# Patient Record
Sex: FEMALE | Race: BLACK OR AFRICAN AMERICAN | NOT HISPANIC OR LATINO | Employment: FULL TIME | URBAN - METROPOLITAN AREA
[De-identification: names, ages, dates, MRNs, and addresses within clinical notes are randomized per-mention and may not be internally consistent; named-entity substitution may affect disease eponyms.]

---

## 2022-08-05 ENCOUNTER — OFFICE VISIT (OUTPATIENT)
Dept: OBSTETRICS AND GYNECOLOGY | Facility: CLINIC | Age: 27
End: 2022-08-05

## 2022-08-05 VITALS
WEIGHT: 165 LBS | DIASTOLIC BLOOD PRESSURE: 94 MMHG | HEIGHT: 67 IN | SYSTOLIC BLOOD PRESSURE: 139 MMHG | BODY MASS INDEX: 25.9 KG/M2

## 2022-08-05 DIAGNOSIS — N76.0 ACUTE VAGINITIS: ICD-10-CM

## 2022-08-05 DIAGNOSIS — Z30.431 IUD CHECK UP: Primary | ICD-10-CM

## 2022-08-05 PROCEDURE — 99202 OFFICE O/P NEW SF 15 MIN: CPT | Performed by: OBSTETRICS & GYNECOLOGY

## 2022-08-05 RX ORDER — FLUCONAZOLE 150 MG/1
TABLET ORAL
COMMUNITY
Start: 2022-06-23 | End: 2022-08-09 | Stop reason: SDUPTHER

## 2022-08-05 NOTE — PROGRESS NOTES
Subjective   Sylvester Clarke is a 26 y.o. female.     Cc:  IUD check and vaginal discharge    History of Present Illness - Patient is a 26 year old nulligravid female who had a Mirena IUD placed 6 weeks ago.  Her previous IUD was placed over a year ago and was removed because of malpositioning of IUD.  She was having difficulty scheduling IUD follow up with gynecologist who placed it.  Further, her partner reports feeling strings during sexual activity.  She also reports vaginal discharge with odor.  She has issues with BV and yeast in past.  She denies STD exposure but current partner is a new sexual partner.      The following portions of the patient's history were reviewed and updated as appropriate:   She  has a past medical history of COVID-19 (01/2022).  She  reports that she has never smoked. She does not have any smokeless tobacco history on file. She reports current alcohol use. She reports that she does not use drugs.  Current Outpatient Medications   Medication Sig Dispense Refill   • Levonorgestrel (MIRENA, 52 MG, IU)      • fluconazole (DIFLUCAN) 150 MG tablet TAKE 1 TABLET BY MOUTH EVERY DAY AS ONE DOSE       No current facility-administered medications for this visit.     She has No Known Allergies..    Review of Systems   Constitutional: Negative for chills and fever.   Genitourinary: Positive for vaginal discharge. Negative for vaginal bleeding.       Objective   Physical Exam  Vitals reviewed. Exam conducted with a chaperone present.   Constitutional:       Appearance: Normal appearance.   Genitourinary:     General: Normal vulva.      Exam position: Lithotomy position.      Labia:         Right: No rash or tenderness.         Left: No rash or tenderness.       Vagina: Normal.      Cervix: Normal.      Comments: IUD strings trimmed  Neurological:      Mental Status: She is alert.   Psychiatric:         Mood and Affect: Mood normal.         Behavior: Behavior normal.         Thought Content:  Thought content normal.         Judgment: Judgment normal.         Assessment & Plan   Diagnoses and all orders for this visit:    1. IUD check up (Primary)        -     Strings trimmed.  Reassurance given.  2. Acute vaginitis  -     NuSwab VG+ - Swab, Vagina  -     Await cultures.

## 2022-08-09 ENCOUNTER — PATIENT MESSAGE (OUTPATIENT)
Dept: OBSTETRICS AND GYNECOLOGY | Facility: CLINIC | Age: 27
End: 2022-08-09

## 2022-08-09 ENCOUNTER — TELEPHONE (OUTPATIENT)
Dept: OBSTETRICS AND GYNECOLOGY | Facility: CLINIC | Age: 27
End: 2022-08-09

## 2022-08-09 ENCOUNTER — PATIENT ROUNDING (BHMG ONLY) (OUTPATIENT)
Dept: OBSTETRICS AND GYNECOLOGY | Facility: CLINIC | Age: 27
End: 2022-08-09

## 2022-08-09 LAB
A VAGINAE DNA VAG QL NAA+PROBE: ABNORMAL SCORE
BVAB2 DNA VAG QL NAA+PROBE: ABNORMAL SCORE
C ALBICANS DNA VAG QL NAA+PROBE: POSITIVE
C GLABRATA DNA VAG QL NAA+PROBE: NEGATIVE
C TRACH DNA VAG QL NAA+PROBE: NEGATIVE
MEGA1 DNA VAG QL NAA+PROBE: ABNORMAL SCORE
N GONORRHOEA DNA VAG QL NAA+PROBE: NEGATIVE
T VAGINALIS DNA VAG QL NAA+PROBE: NEGATIVE

## 2022-08-09 RX ORDER — METRONIDAZOLE 7.5 MG/G
GEL VAGINAL DAILY
Qty: 70 G | Refills: 0 | Status: SHIPPED | OUTPATIENT
Start: 2022-08-09 | End: 2022-08-14

## 2022-08-09 RX ORDER — FLUCONAZOLE 150 MG/1
150 TABLET ORAL ONCE
Qty: 1 TABLET | Refills: 0 | Status: SHIPPED | OUTPATIENT
Start: 2022-08-09 | End: 2022-08-09

## 2022-08-09 RX ORDER — METRONIDAZOLE 500 MG/1
500 TABLET ORAL 2 TIMES DAILY
Qty: 14 TABLET | Refills: 0 | Status: SHIPPED | OUTPATIENT
Start: 2022-08-09 | End: 2022-08-09

## 2022-08-09 NOTE — TELEPHONE ENCOUNTER
Rona    Please let her know that she has a bacterial and a yeast infection.  I called in medication.    Thanks    Arnel

## 2022-08-09 NOTE — TELEPHONE ENCOUNTER
Sylvester would like to try the metronidazole gel instead of pill this time to see if it works any better. [facility] pharmacy on file. Thank you.

## 2022-08-09 NOTE — TELEPHONE ENCOUNTER
Spoke with Ms. Vince to let her know that Dr Seals said she has a bacterial and yeast infection. He sent in Metronidazole and Diflucan. States she has had a lot of infections this summer, but has been swimming a lot. She would like Dr Seals to change the metronidazole to a gel for her to try this time. Thank you   Showering allowed/Do not make important decisions/No heavy lifting/straining/Do not drive or operate machinery Walking - Outdoors allowed/Do not drive or operate machinery/Do not make important decisions/Stairs allowed/Walking - Indoors allowed/No heavy lifting/straining/Showering allowed

## 2023-07-24 ENCOUNTER — ROUTINE PRENATAL (OUTPATIENT)
Dept: OBSTETRICS AND GYNECOLOGY | Facility: CLINIC | Age: 28
End: 2023-07-24
Payer: COMMERCIAL

## 2023-07-24 VITALS — BODY MASS INDEX: 24.12 KG/M2 | DIASTOLIC BLOOD PRESSURE: 88 MMHG | SYSTOLIC BLOOD PRESSURE: 127 MMHG | WEIGHT: 154 LBS

## 2023-07-24 DIAGNOSIS — Z34.01 PRIMIGRAVIDA, FIRST TRIMESTER: ICD-10-CM

## 2023-07-24 DIAGNOSIS — O21.9 NAUSEA AND VOMITING DURING PREGNANCY: ICD-10-CM

## 2023-07-24 DIAGNOSIS — Z3A.11 11 WEEKS GESTATION OF PREGNANCY: Primary | ICD-10-CM

## 2023-07-24 LAB
EXPIRATION DATE: NORMAL
GLUCOSE UR STRIP-MCNC: NEGATIVE MG/DL
Lab: NORMAL
PROT UR STRIP-MCNC: NEGATIVE MG/DL

## 2023-07-24 PROCEDURE — 0502F SUBSEQUENT PRENATAL CARE: CPT | Performed by: OBSTETRICS & GYNECOLOGY

## 2023-07-24 NOTE — PROGRESS NOTES
"Cc:  Pregnancy follow up.  Patient still with some nausea; she stopped taking the Unisom as it made her very sleepy the following day. She did not take the phenergan.  She states she \"belches\" a lot. Also with some cramping, denies any UTI symptoms or bleeding.  Vitals reviewed by me.  FHT obtained.  A/P:  IUP at 11 weeks with nausea/vomiting/GI symptoms of pregnancy  - Patient to try Promethazine when nausea worsens.  She can also take antacids for GERD.    - Cramping.  Likely physiologic.  Reassurance given.  "

## 2023-07-29 ENCOUNTER — HOSPITAL ENCOUNTER (EMERGENCY)
Facility: HOSPITAL | Age: 28
Discharge: HOME | End: 2023-07-29
Attending: EMERGENCY MEDICINE
Payer: COMMERCIAL

## 2023-07-29 ENCOUNTER — APPOINTMENT (OUTPATIENT)
Dept: ULTRASOUND IMAGING | Facility: HOSPITAL | Age: 28
End: 2023-07-29
Payer: COMMERCIAL

## 2023-07-29 VITALS
OXYGEN SATURATION: 99 % | HEART RATE: 83 BPM | BODY MASS INDEX: 24.33 KG/M2 | SYSTOLIC BLOOD PRESSURE: 126 MMHG | TEMPERATURE: 98.1 F | DIASTOLIC BLOOD PRESSURE: 88 MMHG | RESPIRATION RATE: 16 BRPM | WEIGHT: 155 LBS | HEIGHT: 67 IN

## 2023-07-29 DIAGNOSIS — O46.90 VAGINAL BLEEDING IN PREGNANCY: Primary | ICD-10-CM

## 2023-07-29 LAB
ABO GROUP BLD: NORMAL
ANION GAP SERPL CALCULATED.3IONS-SCNC: 13.2 MMOL/L (ref 5–15)
BASOPHILS # BLD AUTO: 0.02 10*3/MM3 (ref 0–0.2)
BASOPHILS NFR BLD AUTO: 0.2 % (ref 0–1.5)
BUN SERPL-MCNC: 9 MG/DL (ref 6–20)
BUN/CREAT SERPL: 11.3 (ref 7–25)
CALCIUM SPEC-SCNC: 9.5 MG/DL (ref 8.6–10.5)
CHLORIDE SERPL-SCNC: 107 MMOL/L (ref 98–107)
CO2 SERPL-SCNC: 19.8 MMOL/L (ref 22–29)
CREAT SERPL-MCNC: 0.8 MG/DL (ref 0.57–1)
DEPRECATED RDW RBC AUTO: 43.1 FL (ref 37–54)
EGFRCR SERPLBLD CKD-EPI 2021: 105 ML/MIN/1.73
EOSINOPHIL # BLD AUTO: 0.11 10*3/MM3 (ref 0–0.4)
EOSINOPHIL NFR BLD AUTO: 1.3 % (ref 0.3–6.2)
ERYTHROCYTE [DISTWIDTH] IN BLOOD BY AUTOMATED COUNT: 13.7 % (ref 12.3–15.4)
GLUCOSE SERPL-MCNC: 108 MG/DL (ref 65–99)
HCG INTACT+B SERPL-ACNC: NORMAL MIU/ML
HCT VFR BLD AUTO: 35.8 % (ref 34–46.6)
HGB BLD-MCNC: 11.4 G/DL (ref 12–15.9)
IMM GRANULOCYTES # BLD AUTO: 0.03 10*3/MM3 (ref 0–0.05)
IMM GRANULOCYTES NFR BLD AUTO: 0.4 % (ref 0–0.5)
LYMPHOCYTES # BLD AUTO: 1.8 10*3/MM3 (ref 0.7–3.1)
LYMPHOCYTES NFR BLD AUTO: 21.3 % (ref 19.6–45.3)
MCH RBC QN AUTO: 27.3 PG (ref 26.6–33)
MCHC RBC AUTO-ENTMCNC: 31.8 G/DL (ref 31.5–35.7)
MCV RBC AUTO: 85.9 FL (ref 79–97)
MONOCYTES # BLD AUTO: 0.5 10*3/MM3 (ref 0.1–0.9)
MONOCYTES NFR BLD AUTO: 5.9 % (ref 5–12)
NEUTROPHILS NFR BLD AUTO: 5.98 10*3/MM3 (ref 1.7–7)
NEUTROPHILS NFR BLD AUTO: 70.9 % (ref 42.7–76)
NRBC BLD AUTO-RTO: 0 /100 WBC (ref 0–0.2)
PLATELET # BLD AUTO: 208 10*3/MM3 (ref 140–450)
PMV BLD AUTO: 10.4 FL (ref 6–12)
POTASSIUM SERPL-SCNC: 3.9 MMOL/L (ref 3.5–5.2)
RBC # BLD AUTO: 4.17 10*6/MM3 (ref 3.77–5.28)
RH BLD: POSITIVE
SODIUM SERPL-SCNC: 140 MMOL/L (ref 136–145)
WBC NRBC COR # BLD: 8.44 10*3/MM3 (ref 3.4–10.8)

## 2023-07-29 PROCEDURE — 84702 CHORIONIC GONADOTROPIN TEST: CPT | Performed by: EMERGENCY MEDICINE

## 2023-07-29 PROCEDURE — 76815 OB US LIMITED FETUS(S): CPT

## 2023-07-29 PROCEDURE — 93976 VASCULAR STUDY: CPT

## 2023-07-29 PROCEDURE — 86901 BLOOD TYPING SEROLOGIC RH(D): CPT | Performed by: EMERGENCY MEDICINE

## 2023-07-29 PROCEDURE — 99284 EMERGENCY DEPT VISIT MOD MDM: CPT

## 2023-07-29 PROCEDURE — 85025 COMPLETE CBC W/AUTO DIFF WBC: CPT | Performed by: EMERGENCY MEDICINE

## 2023-07-29 PROCEDURE — 76817 TRANSVAGINAL US OBSTETRIC: CPT

## 2023-07-29 PROCEDURE — 80048 BASIC METABOLIC PNL TOTAL CA: CPT | Performed by: EMERGENCY MEDICINE

## 2023-07-29 PROCEDURE — 86900 BLOOD TYPING SEROLOGIC ABO: CPT | Performed by: EMERGENCY MEDICINE

## 2023-07-29 RX ORDER — PRENATAL VIT NO.126/IRON/FOLIC 28MG-0.8MG
TABLET ORAL DAILY
COMMUNITY

## 2023-07-29 RX ORDER — SODIUM CHLORIDE 0.9 % (FLUSH) 0.9 %
10 SYRINGE (ML) INJECTION AS NEEDED
Status: DISCONTINUED | OUTPATIENT
Start: 2023-07-29 | End: 2023-07-30 | Stop reason: HOSPADM

## 2023-07-30 NOTE — ED NOTES
Pt presents to ED c/o bright red vaginal bleeding for approx 30 min. Pt states she is 12 weeks pregnant. Pt c/o lower abd cramping.

## 2023-07-30 NOTE — ED PROVIDER NOTES
EMERGENCY DEPARTMENT ENCOUNTER    Room Number:  22/22  Date seen:  7/30/2023  PCP: Rusty Talley MD  Historian: Patient  Relevant information provided by sources other than the patient, review of external records, and social determinants of health may be included in the HPI section.      HPI:  Chief Complaint: Vaginal bleeding  Additional historical features obtained directly from: No one  Context: Sylvester Clarke is a 27 y.o. female who presents to the ED c/o bright red vaginal spotting that occurred immediately after sexual intercourse this evening.  It was painless, it was brief, and it has resolved.  Patient is approximately 13 weeks pregnant and follows with Dr. Seals.  She has had an uneventful prenatal course to this point        Initial impression including social determinants which will impact the assessment symptoms appear to have improved, she is very nervous, I think we will get basic labs and a pelvic ultrasound          PAST MEDICAL HISTORY  Active Ambulatory Problems     Diagnosis Date Noted    No Active Ambulatory Problems     Resolved Ambulatory Problems     Diagnosis Date Noted    No Resolved Ambulatory Problems     Past Medical History:   Diagnosis Date    COVID-19 01/2023         PAST SURGICAL HISTORY  Past Surgical History:   Procedure Laterality Date    CYSTECTOMY  2015    Lower Back    FINGER SURGERY Left     Pinky    WISDOM TOOTH EXTRACTION  2015         FAMILY HISTORY  Family History   Problem Relation Age of Onset    Other Father         Blood clots    Diabetes Sister     Diabetes Maternal Grandmother     Diabetes Maternal Grandfather     Thyroid cancer Paternal Grandmother          SOCIAL HISTORY  Social History     Socioeconomic History    Marital status: Single   Tobacco Use    Smoking status: Never   Vaping Use    Vaping Use: Never used   Substance and Sexual Activity    Alcohol use: Not Currently    Drug use: Never    Sexual activity: Yes     Birth control/protection: None          ALLERGIES  Patient has no known allergies.          PHYSICAL EXAM  ED Triage Vitals   Temp Heart Rate Resp BP SpO2   07/29/23 2204 07/29/23 2204 07/29/23 2204 07/29/23 2208 07/29/23 2204   98.1 °F (36.7 °C) 115 16 133/87 99 %      Temp src Heart Rate Source Patient Position BP Location FiO2 (%)   07/29/23 2204 07/29/23 2204 -- -- --   Tympanic Monitor            Physical Exam      GENERAL: ***no acute distress  HENT: nares patent  EYES: no scleral icterus, PERRL, EOMI  CV: regular rhythm, normal rate, distal pulses symmetric and palpable  RESPIRATORY: normal effort  ABDOMEN: soft, nondistended nontender with normal bowel sound  MUSCULOSKELETAL: no deformity  NEURO: alert, moves all extremities, follows commands  PSYCH:  calm, cooperative  SKIN: warm, dry with no rash        LAB RESULTS  Recent Results (from the past 24 hour(s))   Basic Metabolic Panel    Collection Time: 07/29/23 10:17 PM    Specimen: Blood   Result Value Ref Range    Glucose 108 (H) 65 - 99 mg/dL    BUN 9 6 - 20 mg/dL    Creatinine 0.80 0.57 - 1.00 mg/dL    Sodium 140 136 - 145 mmol/L    Potassium 3.9 3.5 - 5.2 mmol/L    Chloride 107 98 - 107 mmol/L    CO2 19.8 (L) 22.0 - 29.0 mmol/L    Calcium 9.5 8.6 - 10.5 mg/dL    BUN/Creatinine Ratio 11.3 7.0 - 25.0    Anion Gap 13.2 5.0 - 15.0 mmol/L    eGFR 105.0 >60.0 mL/min/1.73   hCG, Quantitative, Pregnancy    Collection Time: 07/29/23 10:17 PM    Specimen: Blood   Result Value Ref Range    HCG Quantitative 63,525.00 mIU/mL   CBC Auto Differential    Collection Time: 07/29/23 10:17 PM    Specimen: Blood   Result Value Ref Range    WBC 8.44 3.40 - 10.80 10*3/mm3    RBC 4.17 3.77 - 5.28 10*6/mm3    Hemoglobin 11.4 (L) 12.0 - 15.9 g/dL    Hematocrit 35.8 34.0 - 46.6 %    MCV 85.9 79.0 - 97.0 fL    MCH 27.3 26.6 - 33.0 pg    MCHC 31.8 31.5 - 35.7 g/dL    RDW 13.7 12.3 - 15.4 %    RDW-SD 43.1 37.0 - 54.0 fl    MPV 10.4 6.0 - 12.0 fL    Platelets 208 140 - 450 10*3/mm3    Neutrophil % 70.9 42.7 - 76.0  "%    Lymphocyte % 21.3 19.6 - 45.3 %    Monocyte % 5.9 5.0 - 12.0 %    Eosinophil % 1.3 0.3 - 6.2 %    Basophil % 0.2 0.0 - 1.5 %    Immature Grans % 0.4 0.0 - 0.5 %    Neutrophils, Absolute 5.98 1.70 - 7.00 10*3/mm3    Lymphocytes, Absolute 1.80 0.70 - 3.10 10*3/mm3    Monocytes, Absolute 0.50 0.10 - 0.90 10*3/mm3    Eosinophils, Absolute 0.11 0.00 - 0.40 10*3/mm3    Basophils, Absolute 0.02 0.00 - 0.20 10*3/mm3    Immature Grans, Absolute 0.03 0.00 - 0.05 10*3/mm3    nRBC 0.0 0.0 - 0.2 /100 WBC   ABO / Rh    Collection Time: 07/29/23 10:17 PM    Specimen: Blood   Result Value Ref Range    ABO Type A     RH type Positive        Ordered the above labs and my independent interpretation of these results are discussed in the section labeled \"ED course\" below        RADIOLOGY  US Ob Limited 1 + Fetuses    Result Date: 7/29/2023  OBSTETRIC SONOGRAM: PELVIC/TRANSVAGINAL TECHNIQUE.  HISTORY: Vaginal bleeding.  FINDINGS: Exam was performed as an emergent procedure through the emergency department to evaluate for living intrauterine pregnancy. The uterus measures 11 x 5.6 x 9.5 cm. There is a single intrauterine pregnancy with crown-rump length measuring 6.5 cm which is consistent with 12 weeks 6 days gestational age. Fetal cardiac activity is measured at 172 bpm. Gestational sac is present within the fundal uterus. Yolk sac is no longer seen. Placenta appears anterior and low-lying. There is no evidence for subchorionic hemorrhage. Cervical length id 3.5 cm and closed.  The right ovary measures 3.8 x 2 x 1.9 cm and the left ovary measures 2.5 x 1.3 x 2 cm. The ovaries appear within normal limits. There is no free fluid in the cul-de-sac.      Single living intrauterine pregnancy at approximately 12 weeks 6 days gestational age by ultrasound estimate.  This report was finalized on 7/29/2023 11:19 PM by Dr. Main Ojeda M.D.       Ordered the above noted radiological studies.  Independently interpreted by me in PACS.  My " "independent interpretation of these results are discussed in the section below labeled \"ED course\"        PROCEDURES  Procedures          MEDICATIONS GIVEN IN ER  Medications - No data to display                MEDICAL DECISION MAKING and INDEPENDENT INTERPRETATIONS      Everything documented below this statement is the \"ED course\" section which I have referred to above.  Everything discussed in the following section constitutes MY INDEPENDENT INTERPRETATIONS of the lab work, EKGs, and radiology studies, and all of my personal conversations with other providers, and all of my independent decision making regarding this patient are discussed below.             Everything documented above with this statement, in the ED course section constitutes my independent interpretation of lab work EKG and radiology studies along with my personal conversations with other providers and my independent medical decision making.  This statement will not be repeated before each data point, but they are all my independent interpretation needs contained within the \"ED course\" section.             All appropriate hygiene and PPE requirements were satisfied with this patient encounter      FINAL DIAGNOSES  Final diagnoses:   Vaginal bleeding in pregnancy           DISPOSITION  Discharge          Latest Documented Vital Signs:  As of 02:27 EDT  BP- 126/88 HR- 83 Temp- 98.1 °F (36.7 °C) (Tympanic) O2 sat- 99%        --    Please note that portions of this were completed with a voice recognition program.       Note Disclaimer: At [facility], we believe that sharing information builds trust and better relationships. You are receiving this note because you are receiving care at [facility] or recently visited. It is possible you will see health information before a provider has talked with you about it. This kind of information can be easy to misunderstand. To help you fully understand what it   "

## 2023-08-10 ENCOUNTER — TELEPHONE (OUTPATIENT)
Dept: OBSTETRICS AND GYNECOLOGY | Facility: CLINIC | Age: 28
End: 2023-08-10
Payer: COMMERCIAL

## 2023-08-10 NOTE — TELEPHONE ENCOUNTER
Pt states benadryl is not working and rash has spread from stomach to back, area of shoulder and under breast. She states it looks like a bunch of bug bites as skin is raises but no redness. However, they are itchy. Please advise.    Thanks,  Young

## 2023-08-15 ENCOUNTER — HOSPITAL ENCOUNTER (EMERGENCY)
Facility: HOSPITAL | Age: 28
Discharge: HOME | End: 2023-08-15
Attending: EMERGENCY MEDICINE | Admitting: EMERGENCY MEDICINE
Payer: COMMERCIAL

## 2023-08-15 VITALS
RESPIRATION RATE: 16 BRPM | HEART RATE: 90 BPM | BODY MASS INDEX: 24.33 KG/M2 | TEMPERATURE: 97.4 F | WEIGHT: 155 LBS | SYSTOLIC BLOOD PRESSURE: 140 MMHG | DIASTOLIC BLOOD PRESSURE: 94 MMHG | OXYGEN SATURATION: 94 % | HEIGHT: 67 IN

## 2023-08-15 DIAGNOSIS — Z3A.14 14 WEEKS GESTATION OF PREGNANCY: ICD-10-CM

## 2023-08-15 DIAGNOSIS — L25.9 CONTACT DERMATITIS, UNSPECIFIED CONTACT DERMATITIS TYPE, UNSPECIFIED TRIGGER: Primary | ICD-10-CM

## 2023-08-15 PROCEDURE — 99282 EMERGENCY DEPT VISIT SF MDM: CPT

## 2023-08-15 RX ORDER — DIPHENHYDRAMINE HCL 25 MG
25 CAPSULE ORAL EVERY 6 HOURS PRN
COMMUNITY

## 2023-08-15 RX ORDER — METHYLPREDNISOLONE 4 MG/1
TABLET ORAL
Qty: 21 TABLET | Refills: 0 | Status: SHIPPED | OUTPATIENT
Start: 2023-08-15

## 2023-08-15 RX ORDER — HYDROXYZINE HYDROCHLORIDE 25 MG/1
25 TABLET, FILM COATED ORAL 3 TIMES DAILY PRN
Qty: 15 TABLET | Refills: 0 | Status: SHIPPED | OUTPATIENT
Start: 2023-08-15

## 2023-08-15 NOTE — ED PROVIDER NOTES
EMERGENCY DEPARTMENT ENCOUNTER    Room Number:  T01/01  Date seen:  8/15/2023  PCP: Rusty Talley MD  Historian/Independent historian: n/a  Chronic or social conditions impacting care: pregnancy      HPI:  Chief Complaint: rash  A complete HPI/ROS/PMH/PSH/SH/FH are unobtainable due to: n/a  Context: Sylvester Clarke is a 27 y.o. female who presents to the ED c/o rash to abdomen, back, bilateral upper extremities for the past few days.  She states that she is currently 14 weeks pregnant and her OB/GYN recommended Benadryl, but has not been helping with the itching.  She denies any known irritants.  She states that she did recently change her detergent, but has not had issues with that in the past.  No other known irritants or allergies.  No cough, wheezing, shortness of breath.    External Medical record review:   8/9/2023: OB/GYN message regarding rash, recommended Benadryl      PAST MEDICAL HISTORY  Active Ambulatory Problems     Diagnosis Date Noted    No Active Ambulatory Problems     Resolved Ambulatory Problems     Diagnosis Date Noted    No Resolved Ambulatory Problems     Past Medical History:   Diagnosis Date    COVID-19 01/2023         PAST SURGICAL HISTORY  Past Surgical History:   Procedure Laterality Date    CYSTECTOMY  2015    Lower Back    FINGER SURGERY Left     Pinky    WISDOM TOOTH EXTRACTION  2015         FAMILY HISTORY  Family History   Problem Relation Age of Onset    Other Father         Blood clots    Diabetes Sister     Diabetes Maternal Grandmother     Diabetes Maternal Grandfather     Thyroid cancer Paternal Grandmother          SOCIAL HISTORY  Social History     Socioeconomic History    Marital status: Single   Tobacco Use    Smoking status: Never   Vaping Use    Vaping Use: Never used   Substance and Sexual Activity    Alcohol use: Not Currently    Drug use: Never    Sexual activity: Yes     Birth control/protection: None         ALLERGIES  Patient has no known  allergies.        REVIEW OF SYSTEMS  Per HPI, otherwise negative.       PHYSICAL EXAM  ED Triage Vitals [08/15/23 1806]   Temp Heart Rate Resp BP SpO2   97.4 øF (36.3 øC) 91 16 -- 94 %      Temp src Heart Rate Source Patient Position BP Location FiO2 (%)   -- -- -- -- --       Physical Exam  Vitals and nursing note reviewed.   Constitutional:       Appearance: Normal appearance.   HENT:      Head: Normocephalic and atraumatic.      Right Ear: Tympanic membrane and ear canal normal.      Left Ear: Tympanic membrane and ear canal normal.      Nose: Nose normal.      Mouth/Throat:      Mouth: Mucous membranes are moist.   Eyes:      Extraocular Movements: Extraocular movements intact.      Conjunctiva/sclera: Conjunctivae normal.   Cardiovascular:      Rate and Rhythm: Normal rate and regular rhythm.      Pulses: Normal pulses.      Heart sounds: Normal heart sounds.   Pulmonary:      Effort: Pulmonary effort is normal.      Breath sounds: Normal breath sounds. No stridor. No wheezing.   Abdominal:      General: Bowel sounds are normal.      Palpations: Abdomen is soft.      Tenderness: There is no abdominal tenderness. There is no guarding.   Musculoskeletal:         General: No swelling.   Skin:     General: Skin is warm.      Findings: Rash (see photos below) present.   Neurological:      Mental Status: She is alert and oriented to person, place, and time. Mental status is at baseline.   Psychiatric:         Mood and Affect: Mood normal.             LAB RESULTS  No results found for this or any previous visit (from the past 24 hour(s)).    Ordered the above labs and reviewed the results.        RADIOLOGY  No Radiology Exams Resulted Within Past 24 Hours    Ordered the above noted radiological studies. Reviewed by me in PACS.        MEDICATIONS GIVEN IN ER  Medications - No data to display        MEDICAL DECISION MAKING, PROGRESS, and CONSULTS    All labs have been independently reviewed by me.  All radiology studies  have been reviewed by me and I have also reviewed the radiology report.   EKG's independently viewed and interpreted by me.  Discussion below represents my analysis of pertinent findings related to patient's condition, differential diagnosis, treatment plan and final disposition.    27-year-old female currently 14 weeks pregnant presents with rash.  Has been taking Benadryl with no relief of itching.  Discussed risks/benefits with patient and pharmacy about Medrol Dosepak and she is agreeable at this time.  I also prescribed her hydroxyzine to help with the itching.  She was instructed not to take Benadryl and hydroxyzine at the same time.          Shared decision making/consideration for admission:  stable for outpatient follow up      Orders placed during this visit:  No orders of the defined types were placed in this encounter.        Differential diagnosis include, but not limited to: contact dermatitis, hives      Additional orders considered but not ordered: n/a    Independent interpretation of labs, radiology studies, and discussions with consultants:    Discussed with Dr. Tucker, who agrees with plan.     ED Course as of 08/15/23 1937   Tue Aug 15, 2023   1846 Discussed with maria c Humphreys. He states medrol dospak would be preferred oral steroid in pregnancy for contact dermatitis.  [JG]      ED Course User Index  [JG] Linda Song, APRN       1855: Updated patient on ED workup, including labs and imaging (if performed). We discussed follow up instructions and return precautions. The patient verbalizes understanding and is ready for discharge. I also instructed the patient to follow up with OBGY, derm.       DIAGNOSIS  Final diagnoses:   Contact dermatitis, unspecified contact dermatitis type, unspecified trigger   14 weeks gestation of pregnancy         DISPOSITION  discharge        Latest Documented Vital Signs:  As of 19:37 EDT  BP- 140/94 HR- 90 Temp- 97.4 øF (36.3 øC) O2 sat- 94%               --    Please note that portions of this were completed with a voice recognition program.       Note Disclaimer: At [facility], we believe that sharing information builds trust and better relationships. You are receiving this note because you are receiving care at [facility] or recently visited. It is possible you will see health information before a provider has talked with you about it. This kind of information can be easy to misunderstand. To help you fully understand what it means for your health, we urge you to discuss this note with your provider.             Linda Song, APRN  08/15/23 1939

## 2023-08-16 ENCOUNTER — TELEPHONE (OUTPATIENT)
Dept: OBSTETRICS AND GYNECOLOGY | Facility: CLINIC | Age: 28
End: 2023-08-16
Payer: COMMERCIAL

## 2023-08-16 NOTE — TELEPHONE ENCOUNTER
Patient is 14.6 wk OB that went to ER and wanted to make sure meds given were ok to take during pregnancy. Atarax amd steroid doespak ok per Rona/ph

## 2023-08-24 ENCOUNTER — ROUTINE PRENATAL (OUTPATIENT)
Dept: OBSTETRICS AND GYNECOLOGY | Facility: CLINIC | Age: 28
End: 2023-08-24
Payer: COMMERCIAL

## 2023-08-24 VITALS — WEIGHT: 155 LBS | BODY MASS INDEX: 24.28 KG/M2 | DIASTOLIC BLOOD PRESSURE: 84 MMHG | SYSTOLIC BLOOD PRESSURE: 124 MMHG

## 2023-08-24 DIAGNOSIS — Z34.02 PRIMIGRAVIDA, SECOND TRIMESTER: ICD-10-CM

## 2023-08-24 DIAGNOSIS — O20.9 FIRST TRIMESTER BLEEDING: ICD-10-CM

## 2023-08-24 DIAGNOSIS — Z3A.16 16 WEEKS GESTATION OF PREGNANCY: Primary | ICD-10-CM

## 2023-08-24 LAB
EXPIRATION DATE: ABNORMAL
GLUCOSE UR STRIP-MCNC: NEGATIVE MG/DL
Lab: ABNORMAL
PROT UR STRIP-MCNC: ABNORMAL MG/DL

## 2023-08-24 NOTE — PROGRESS NOTES
Cc:  Pregnancy follow up.  Patient had been to the ER x2 since her last visit. Her 1st time was for bleeding after intercourse 3.5 weeks ago, she has had intercourse since then but no bleeding. The 2nd time she went was for a rash on her torso and back, she was prescribed medication but was reluctant to take them, instead used a home remedy oil, this was last week she feels the rash has resolved.  She continues to have nausea but manages with diet.  Vitals reviewed by me.  Gen - alert and pleasant.  Abdomen - nontender, no guarding or rebound.  A/P:  IUP at 16 weeks  - Sonogram in 3 to 4 weeks for anatomy.  - OK to use steroid cream for dermatitis.  - Discussed dietary modifications for nausea.

## 2023-09-14 ENCOUNTER — ROUTINE PRENATAL (OUTPATIENT)
Dept: OBSTETRICS AND GYNECOLOGY | Facility: CLINIC | Age: 28
End: 2023-09-14
Payer: COMMERCIAL

## 2023-09-14 VITALS — DIASTOLIC BLOOD PRESSURE: 82 MMHG | SYSTOLIC BLOOD PRESSURE: 144 MMHG | WEIGHT: 159 LBS | BODY MASS INDEX: 24.9 KG/M2

## 2023-09-14 DIAGNOSIS — Z34.02 PRIMIGRAVIDA, SECOND TRIMESTER: ICD-10-CM

## 2023-09-14 DIAGNOSIS — Z3A.19 19 WEEKS GESTATION OF PREGNANCY: Primary | ICD-10-CM

## 2023-09-14 NOTE — PROGRESS NOTES
Cc:  Pregnancy follow up.  Patient states nausea has improved.  She reports that nausea now resolves with eating.  No bleeding or spotting.  Vitals reviewed by me.  Gen - alert and pleasant.  Abdomen - nontender  Ultrasound with normal anatomy.  A/P:  IUP at 19 weeks.  - Continue routine care.  Diabetes testing in 5 weeks.

## 2023-10-19 ENCOUNTER — ROUTINE PRENATAL (OUTPATIENT)
Dept: OBSTETRICS AND GYNECOLOGY | Facility: CLINIC | Age: 28
End: 2023-10-19
Payer: COMMERCIAL

## 2023-10-19 ENCOUNTER — TELEPHONE (OUTPATIENT)
Dept: OBSTETRICS AND GYNECOLOGY | Facility: CLINIC | Age: 28
End: 2023-10-19

## 2023-10-19 VITALS — DIASTOLIC BLOOD PRESSURE: 87 MMHG | BODY MASS INDEX: 26.47 KG/M2 | SYSTOLIC BLOOD PRESSURE: 142 MMHG | WEIGHT: 169 LBS

## 2023-10-19 DIAGNOSIS — Z34.02 PRIMIGRAVIDA, SECOND TRIMESTER: ICD-10-CM

## 2023-10-19 DIAGNOSIS — Z3A.24 24 WEEKS GESTATION OF PREGNANCY: Primary | ICD-10-CM

## 2023-10-19 LAB
EXPIRATION DATE: ABNORMAL
GLUCOSE 1H P 50 G GLC PO SERPL-MCNC: 82 MG/DL (ref 65–139)
GLUCOSE UR STRIP-MCNC: NEGATIVE MG/DL
Lab: ABNORMAL
PROT UR STRIP-MCNC: ABNORMAL MG/DL

## 2023-10-19 PROCEDURE — 0502F SUBSEQUENT PRENATAL CARE: CPT | Performed by: OBSTETRICS & GYNECOLOGY

## 2023-10-19 NOTE — PROGRESS NOTES
Patient with some left sided hip, she attributes this to weight gain. She also is c/o being SOB, no hx of asthma. Patient is doing GTT1 today. She received Flu vaccine through her employer.

## 2023-10-23 NOTE — PROGRESS NOTES
Cc:  Pregnancy follow up.  Feels well overall.  Concerned about weight gain.  Good FM.  Vitals reviewed by me.  BP noted.  Gen - alert and pleasant.  Abdomen - nontender.  A/P:  IUP at 24 weeks  - Discussed diet and weight gain.  - Glucola performed.

## 2023-11-09 ENCOUNTER — ROUTINE PRENATAL (OUTPATIENT)
Dept: OBSTETRICS AND GYNECOLOGY | Facility: CLINIC | Age: 28
End: 2023-11-09
Payer: COMMERCIAL

## 2023-11-09 VITALS — BODY MASS INDEX: 27.25 KG/M2 | SYSTOLIC BLOOD PRESSURE: 122 MMHG | WEIGHT: 174 LBS | DIASTOLIC BLOOD PRESSURE: 80 MMHG

## 2023-11-09 DIAGNOSIS — Z3A.27 27 WEEKS GESTATION OF PREGNANCY: Primary | ICD-10-CM

## 2023-11-09 DIAGNOSIS — O16.3 ELEVATED BLOOD PRESSURE COMPLICATING PREGNANCY, ANTEPARTUM, THIRD TRIMESTER: ICD-10-CM

## 2023-11-09 DIAGNOSIS — Z34.02 PRIMIGRAVIDA, SECOND TRIMESTER: ICD-10-CM

## 2023-11-09 NOTE — PROGRESS NOTES
Cc:  Pregnancy follow up.  No major complaints.  Good FM.  No bleeding or spotting.  Vitals reviewed by me.  Weight gain adequate.  Gen - alert and pleasant.  Abdomen - nontender.  A/P:  IUP at 27 weeks with possible cHTN  - cHTN.  Sonogram for growth next visit.  - Discussed management of third trimester, exercise.

## 2023-11-23 ENCOUNTER — HOSPITAL ENCOUNTER (EMERGENCY)
Facility: HOSPITAL | Age: 28
Discharge: HOME | End: 2023-11-23
Attending: OBSTETRICS & GYNECOLOGY | Admitting: OBSTETRICS & GYNECOLOGY
Payer: COMMERCIAL

## 2023-11-23 VITALS
RESPIRATION RATE: 17 BRPM | BODY MASS INDEX: 27.25 KG/M2 | DIASTOLIC BLOOD PRESSURE: 85 MMHG | TEMPERATURE: 98.2 F | SYSTOLIC BLOOD PRESSURE: 131 MMHG | HEIGHT: 67 IN | HEART RATE: 81 BPM

## 2023-11-23 LAB
BACTERIA UR QL AUTO: NORMAL /HPF
BILIRUB UR QL STRIP: NEGATIVE
CLARITY UR: CLEAR
COLOR UR: YELLOW
GLUCOSE UR STRIP-MCNC: NEGATIVE MG/DL
HGB UR QL STRIP.AUTO: ABNORMAL
HYALINE CASTS UR QL AUTO: NORMAL /LPF
KETONES UR QL STRIP: NEGATIVE
LEUKOCYTE ESTERASE UR QL STRIP.AUTO: NEGATIVE
NITRITE UR QL STRIP: NEGATIVE
PH UR STRIP.AUTO: 8.5 [PH] (ref 5–8)
PROT UR QL STRIP: NEGATIVE
RBC # UR STRIP: NORMAL /HPF
REF LAB TEST METHOD: NORMAL
SP GR UR STRIP: 1.01 (ref 1–1.03)
SQUAMOUS #/AREA URNS HPF: NORMAL /HPF
UROBILINOGEN UR QL STRIP: ABNORMAL
WBC # UR STRIP: NORMAL /HPF

## 2023-11-23 PROCEDURE — 81001 URINALYSIS AUTO W/SCOPE: CPT | Performed by: OBSTETRICS & GYNECOLOGY

## 2023-11-23 PROCEDURE — 87086 URINE CULTURE/COLONY COUNT: CPT | Performed by: OBSTETRICS & GYNECOLOGY

## 2023-11-23 PROCEDURE — 59025 FETAL NON-STRESS TEST: CPT

## 2023-11-23 PROCEDURE — 99284 EMERGENCY DEPT VISIT MOD MDM: CPT | Performed by: OBSTETRICS & GYNECOLOGY

## 2023-11-23 NOTE — OBED NOTES
ANAI Note OB        Patient Name: Sylvester Clarke  YOB: 1995  MRN: [id number]  Admission Date: 2023  1:47 PM  Date of Service: 2023    Chief Complaint: Vaginal Bleeding (ANAI-  at 29wks arrives from home with c/o vaginal bleeding after intercourse. Pt reports small amt of pink tinged discharge, denies bright red. Pt reports DFM after intercourse but feeling baby move normally now. Pt denies LOF, ctxs. Abdomen palpates soft)        Subjective     Sylvester Clarke is a 28 y.o. female  at 29w0d with Estimated Date of Delivery: 24 who presents with the chief complaint listed above.  Patient noticed some scant pink spotting on toilet paper a few hours after intercourse that is now brown in color. She sees Rusty Seals,* for her prenatal care. Her pregnancy has been complicated by:   possible chronic hypertension .        She describes fetal movement as initially decreased but now normal.  She denies rupture of membranes.  She admits to scant vaginal spotting after intercourse. She is not feeling contractions.          Objective   There are no problems to display for this patient.       OB History    Para Term  AB Living   1 0 0 0 0 0   SAB IAB Ectopic Molar Multiple Live Births   0 0 0 0 0 0      # Outcome Date GA Lbr Dat/2nd Weight Sex Delivery Anes PTL Lv   1 Current                 Past Medical History:   Diagnosis Date    Chlamydia 2016    COVID-19 2023    2022 10/2021-Vaxxed and Boosted       Past Surgical History:   Procedure Laterality Date    CYSTECTOMY  2015    Lower Back    FINGER SURGERY Left     Pinky    WISDOM TOOTH EXTRACTION         No current facility-administered medications on file prior to encounter.     Current Outpatient Medications on File Prior to Encounter   Medication Sig Dispense Refill    prenatal vitamin (prenatal, CLASSIC, vitamin) tablet Take  by mouth Daily.      diphenhydrAMINE (BENADRYL) 25 mg  "capsule Take 1 capsule by mouth Every 6 (Six) Hours As Needed for Itching. Since August 10th per OB/GYN. Taken QHS only.      hydrOXYzine (ATARAX) 25 MG tablet Take 1 tablet by mouth 3 (Three) Times a Day As Needed for Itching. 15 tablet 0    methylPREDNISolone (MEDROL) 4 MG dose pack Take as directed on package instructions. 21 tablet 0    promethazine (PHENERGAN) 12.5 MG tablet Take 1 tablet by mouth Every 6 (Six) Hours As Needed for Nausea or Vomiting. 20 tablet 1       No Known Allergies    Family History   Problem Relation Age of Onset    Other Father         Blood clots    Diabetes Sister     Diabetes Maternal Grandmother     Diabetes Maternal Grandfather     Thyroid cancer Paternal Grandmother        Social History     Socioeconomic History    Marital status: Single   Tobacco Use    Smoking status: Never     Passive exposure: Never   Vaping Use    Vaping Use: Never used   Substance and Sexual Activity    Alcohol use: Not Currently    Drug use: Never    Sexual activity: Yes     Birth control/protection: None           Review of Systems   HEENT: negative  Pulmonary: negative  CV: negative  GI: negative  : vaginal spotting  Musculoskeletal: negative  Neuro: negative    PHYSICAL EXAM:      VITAL SIGNS:  Vitals:    11/23/23 1359   BP: 131/85   BP Location: Right arm   Patient Position: Lying   Pulse: 81   Resp: 17   Temp: 98.2 °F (36.8 °C)   TempSrc: Oral   Height: 170.2 cm (67\")        FHT'S:                   Baseline:  140 BPM  Variability:  Moderate = 6 - 25 BPM  Accelerations:  10 x 10 accelerations present     Decelerations:  absent  Contractions:    irritable pattern      Interpretation:    Reactive NST for gestational age, CAT 1 tracing        PHYSICAL EXAM:    General: well developed; well nourished  no acute distress   Heart: Not performed.   Lungs  : breathing is unlabored     Abdomen: soft, non-tender; no masses       Cervix: Was checked by RN: 0 cm dilation      Contractions: Irregular, not felt by " "patient        Extremities: peripheral pulses normal, no pedal edema, no clubbing or cyanosis      LABS AND TESTING ORDERED:  Uterine and fetal monitoring  Urinalysis      LAB RESULTS:    Recent Results (from the past 24 hour(s))   Urinalysis With Culture If Indicated - Urine, Clean Catch    Collection Time: 23  2:11 PM    Specimen: Urine, Clean Catch   Result Value Ref Range    Color, UA Yellow Yellow, Straw    Appearance, UA Clear Clear    pH, UA 8.5 (H) 5.0 - 8.0    Specific Gravity, UA 1.010 1.005 - 1.030    Glucose, UA Negative Negative    Ketones, UA Negative Negative    Bilirubin, UA Negative Negative    Blood, UA Moderate (2+) (A) Negative    Protein, UA Negative Negative    Leuk Esterase, UA Negative Negative    Nitrite, UA Negative Negative    Urobilinogen, UA 0.2 E.U./dL 0.2 - 1.0 E.U./dL   Urinalysis, Microscopic Only - Urine, Clean Catch    Collection Time: 23  2:11 PM    Specimen: Urine, Clean Catch   Result Value Ref Range    RBC, UA 0-2 None Seen, 0-2 /HPF    WBC, UA 0-2 None Seen, 0-2 /HPF    Bacteria, UA None Seen None Seen /HPF    Squamous Epithelial Cells, UA 0-2 None Seen, 0-2 /HPF    Hyaline Casts, UA 0-2 None Seen /LPF    Methodology Automated Microscopy        Lab Results   Component Value Date    ABO A 2023    RH Positive 2023       No results found for: \"STREPGPB\"              Assessment & Plan     ASSESSMENT/PLAN:  Sylvester Clarke is a 28 y.o. female  at 29w0d who presented with: post coital bleeding          Final Impression:  Pregnancy at 29w0d  Reactive NST.  CAT 1 tracing  Post coital bleeding  Maternal vital signs were reviewed and were unremarkable              Vitals:    23 1359   BP: 131/85   BP Location: Right arm   Patient Position: Lying   Pulse: 81   Resp: 17   Temp: 98.2 °F (36.8 °C)   TempSrc: Oral   Height: 170.2 cm (67\")       No results found for: \"STREPGPB\"  Lab Results   Component Value Date    ABO A 2023    RH Positive " 07/29/2023         PLAN:     Patient discharged home with bleeding and labor precautions  She will keep her scheduled outpatient prenatal appointment.    I have spent 15 minutes including face to face time with the patient, greater than 50% in discussion of the diagnosis (counseling) and/or coordination of care.     Jazmin Freedman MD  11/23/2023  14:48 EST  OB Hospitalist  Phone:  [phone]

## 2023-11-24 LAB — BACTERIA SPEC AEROBE CULT: NO GROWTH

## 2023-12-01 ENCOUNTER — ROUTINE PRENATAL (OUTPATIENT)
Dept: OBSTETRICS AND GYNECOLOGY | Facility: CLINIC | Age: 28
End: 2023-12-01
Payer: COMMERCIAL

## 2023-12-01 VITALS — SYSTOLIC BLOOD PRESSURE: 123 MMHG | WEIGHT: 181 LBS | BODY MASS INDEX: 28.35 KG/M2 | DIASTOLIC BLOOD PRESSURE: 84 MMHG

## 2023-12-01 DIAGNOSIS — Z34.03 PRIMIGRAVIDA, THIRD TRIMESTER: ICD-10-CM

## 2023-12-01 DIAGNOSIS — Z23 NEED FOR DIPHTHERIA-TETANUS-PERTUSSIS (TDAP) VACCINE: ICD-10-CM

## 2023-12-01 DIAGNOSIS — Z3A.30 30 WEEKS GESTATION OF PREGNANCY: Primary | ICD-10-CM

## 2023-12-01 NOTE — PROGRESS NOTES
Cc:  Pregnancy follow up.  Patient had bleeding last week after having intercourse, she went to labor and delivery for evaluation and has not had any bleeding since.  Good FM.  Vitals reviewed by me.  Gen - alert and pleasant.  Abdomen - gravid  Ultrasound with normal growth, CURT.  A/P:  IUP at 30 weeks   - Answered questions about diet, exercise, working in pregnancy.  - Patient to get Pertussis vaccine today.

## 2023-12-15 ENCOUNTER — ROUTINE PRENATAL (OUTPATIENT)
Dept: OBSTETRICS AND GYNECOLOGY | Facility: CLINIC | Age: 28
End: 2023-12-15
Payer: COMMERCIAL

## 2023-12-15 VITALS — DIASTOLIC BLOOD PRESSURE: 89 MMHG | BODY MASS INDEX: 29.13 KG/M2 | SYSTOLIC BLOOD PRESSURE: 144 MMHG | WEIGHT: 186 LBS

## 2023-12-15 DIAGNOSIS — Z34.03 PRIMIGRAVIDA, THIRD TRIMESTER: ICD-10-CM

## 2023-12-15 DIAGNOSIS — O16.3 ELEVATED BLOOD PRESSURE COMPLICATING PREGNANCY, ANTEPARTUM, THIRD TRIMESTER: ICD-10-CM

## 2023-12-15 DIAGNOSIS — Z3A.32 32 WEEKS GESTATION OF PREGNANCY: Primary | ICD-10-CM

## 2023-12-15 NOTE — PROGRESS NOTES
Cc:  Pregnancy follow up.  Patient c/o swelling in hands and feet. She also has been SOB.  Symptoms mild.  Good FM.  No bleeding or spotting.    Vitals reviewed by me.  Repeat /78  Gen - alert and pleasant.    Abdomen - nontender, gravid.  CBC, CMP and Protein/Creatinine ratio ordered  A/P:  IUP at 32 weeks with elevated blood pressure  - Labs ordered.  Discussed implications and risks of HTN in pregnancy.  Follow up in one week for repeat BP check.

## 2023-12-16 LAB
ALBUMIN SERPL-MCNC: 3.7 G/DL (ref 3.5–5.2)
ALBUMIN/GLOB SERPL: 1.4 G/DL
ALP SERPL-CCNC: 153 U/L (ref 39–117)
ALT SERPL-CCNC: 29 U/L (ref 1–33)
AST SERPL-CCNC: 36 U/L (ref 1–32)
BILIRUB SERPL-MCNC: 0.2 MG/DL (ref 0–1.2)
BUN SERPL-MCNC: 7 MG/DL (ref 6–20)
BUN/CREAT SERPL: 10.8 (ref 7–25)
CALCIUM SERPL-MCNC: 9.2 MG/DL (ref 8.6–10.5)
CHLORIDE SERPL-SCNC: 105 MMOL/L (ref 98–107)
CO2 SERPL-SCNC: 23.4 MMOL/L (ref 22–29)
CREAT SERPL-MCNC: 0.65 MG/DL (ref 0.57–1)
CREAT UR-MCNC: 222.5 MG/DL
EGFRCR SERPLBLD CKD-EPI 2021: 124.7 ML/MIN/1.73
ERYTHROCYTE [DISTWIDTH] IN BLOOD BY AUTOMATED COUNT: 14 % (ref 12.3–15.4)
GLOBULIN SER CALC-MCNC: 2.6 GM/DL
GLUCOSE SERPL-MCNC: 65 MG/DL (ref 65–99)
HCT VFR BLD AUTO: 34 % (ref 34–46.6)
HGB BLD-MCNC: 11 G/DL (ref 12–15.9)
MCH RBC QN AUTO: 27.8 PG (ref 26.6–33)
MCHC RBC AUTO-ENTMCNC: 32.4 G/DL (ref 31.5–35.7)
MCV RBC AUTO: 85.9 FL (ref 79–97)
PLATELET # BLD AUTO: 174 10*3/MM3 (ref 140–450)
POTASSIUM SERPL-SCNC: 3.8 MMOL/L (ref 3.5–5.2)
PROT SERPL-MCNC: 6.3 G/DL (ref 6–8.5)
PROT UR-MCNC: 31.7 MG/DL
PROT/CREAT UR: 142 MG/G CREAT (ref 0–200)
RBC # BLD AUTO: 3.96 10*6/MM3 (ref 3.77–5.28)
SODIUM SERPL-SCNC: 139 MMOL/L (ref 136–145)
WBC # BLD AUTO: 7.26 10*3/MM3 (ref 3.4–10.8)

## 2023-12-22 ENCOUNTER — ROUTINE PRENATAL (OUTPATIENT)
Dept: OBSTETRICS AND GYNECOLOGY | Facility: CLINIC | Age: 28
End: 2023-12-22
Payer: COMMERCIAL

## 2023-12-22 VITALS — SYSTOLIC BLOOD PRESSURE: 134 MMHG | WEIGHT: 189 LBS | BODY MASS INDEX: 29.6 KG/M2 | DIASTOLIC BLOOD PRESSURE: 80 MMHG

## 2023-12-22 DIAGNOSIS — Z34.03 PRIMIGRAVIDA, THIRD TRIMESTER: ICD-10-CM

## 2023-12-22 DIAGNOSIS — O16.3 HYPERTENSION AFFECTING PREGNANCY IN THIRD TRIMESTER: ICD-10-CM

## 2023-12-22 DIAGNOSIS — Z3A.33 33 WEEKS GESTATION OF PREGNANCY: Primary | ICD-10-CM

## 2023-12-22 NOTE — PROGRESS NOTES
Cc:  Pregnancy follow up  Patient states her feet, ankles and finger tips are swollen.  Fetal movement is excellent.  No bleeding or spotting.  Vitas reviewed and blood pressure is normotensive this week.  No significant proteinuria.  A/P:  IUP at 33 weeks with elevated blood pressure possible chronic HTN.  - Patient with no previous history of HTN.  However, she has had borderline BP in pregnancy.  Will follow closely.  She does not meet criteria for pre-eclampsia.  Sonogram next week.

## 2023-12-28 ENCOUNTER — HOSPITAL ENCOUNTER (INPATIENT)
Facility: HOSPITAL | Age: 28
LOS: 4 days | Discharge: HOME | End: 2024-01-04
Attending: OBSTETRICS & GYNECOLOGY | Admitting: OBSTETRICS & GYNECOLOGY
Payer: COMMERCIAL

## 2023-12-28 ENCOUNTER — ROUTINE PRENATAL (OUTPATIENT)
Dept: OBSTETRICS AND GYNECOLOGY | Facility: CLINIC | Age: 28
End: 2023-12-28
Payer: COMMERCIAL

## 2023-12-28 VITALS — DIASTOLIC BLOOD PRESSURE: 99 MMHG | WEIGHT: 196 LBS | BODY MASS INDEX: 30.7 KG/M2 | SYSTOLIC BLOOD PRESSURE: 154 MMHG

## 2023-12-28 DIAGNOSIS — Z34.03 PRIMIGRAVIDA, THIRD TRIMESTER: ICD-10-CM

## 2023-12-28 DIAGNOSIS — O14.23 HELLP SYNDROME IN THIRD TRIMESTER: ICD-10-CM

## 2023-12-28 DIAGNOSIS — O16.3 HYPERTENSION AFFECTING PREGNANCY IN THIRD TRIMESTER: ICD-10-CM

## 2023-12-28 DIAGNOSIS — R51.9 INTRACTABLE HEADACHE, UNSPECIFIED CHRONICITY PATTERN, UNSPECIFIED HEADACHE TYPE: ICD-10-CM

## 2023-12-28 DIAGNOSIS — Z3A.34 34 WEEKS GESTATION OF PREGNANCY: Primary | ICD-10-CM

## 2023-12-28 PROBLEM — Z34.90 PREGNANCY: Status: RESOLVED | Noted: 2023-12-28 | Resolved: 2023-12-28

## 2023-12-28 PROBLEM — Z34.90 PREGNANCY: Status: ACTIVE | Noted: 2023-12-28

## 2023-12-28 PROBLEM — O14.93 PRE-ECLAMPSIA IN THIRD TRIMESTER: Status: ACTIVE | Noted: 2023-12-28

## 2023-12-28 LAB
ALBUMIN SERPL-MCNC: 3.8 G/DL (ref 3.5–5.2)
ALBUMIN/GLOB SERPL: 1.7 G/DL
ALP SERPL-CCNC: 180 U/L (ref 39–117)
ALT SERPL W P-5'-P-CCNC: 36 U/L (ref 1–33)
ANION GAP SERPL CALCULATED.3IONS-SCNC: 13 MMOL/L (ref 5–15)
AST SERPL-CCNC: 35 U/L (ref 1–32)
BILIRUB SERPL-MCNC: 0.2 MG/DL (ref 0–1.2)
BUN SERPL-MCNC: 7 MG/DL (ref 6–20)
BUN/CREAT SERPL: 11.3 (ref 7–25)
CALCIUM SPEC-SCNC: 9 MG/DL (ref 8.6–10.5)
CHLORIDE SERPL-SCNC: 107 MMOL/L (ref 98–107)
CO2 SERPL-SCNC: 18 MMOL/L (ref 22–29)
CREAT SERPL-MCNC: 0.62 MG/DL (ref 0.57–1)
CREAT UR-MCNC: 45.2 MG/DL
DEPRECATED RDW RBC AUTO: 44 FL (ref 37–54)
EGFRCR SERPLBLD CKD-EPI 2021: 126.1 ML/MIN/1.73
ERYTHROCYTE [DISTWIDTH] IN BLOOD BY AUTOMATED COUNT: 14.3 % (ref 12.3–15.4)
EXPIRATION DATE: ABNORMAL
GLOBULIN UR ELPH-MCNC: 2.2 GM/DL
GLUCOSE SERPL-MCNC: 110 MG/DL (ref 65–99)
GLUCOSE UR STRIP-MCNC: NEGATIVE MG/DL
HCT VFR BLD AUTO: 34.3 % (ref 34–46.6)
HGB BLD-MCNC: 10.7 G/DL (ref 12–15.9)
Lab: ABNORMAL
MCH RBC QN AUTO: 26.6 PG (ref 26.6–33)
MCHC RBC AUTO-ENTMCNC: 31.2 G/DL (ref 31.5–35.7)
MCV RBC AUTO: 85.1 FL (ref 79–97)
PLATELET # BLD AUTO: 151 10*3/MM3 (ref 140–450)
PMV BLD AUTO: 9.8 FL (ref 6–12)
POTASSIUM SERPL-SCNC: 3.7 MMOL/L (ref 3.5–5.2)
PROT ?TM UR-MCNC: 15.7 MG/DL
PROT SERPL-MCNC: 6 G/DL (ref 6–8.5)
PROT UR STRIP-MCNC: ABNORMAL MG/DL
PROT/CREAT UR: 347.3 MG/G CREA (ref 0–200)
RBC # BLD AUTO: 4.03 10*6/MM3 (ref 3.77–5.28)
SODIUM SERPL-SCNC: 138 MMOL/L (ref 136–145)
WBC NRBC COR # BLD AUTO: 7.99 10*3/MM3 (ref 3.4–10.8)

## 2023-12-28 PROCEDURE — 59025 FETAL NON-STRESS TEST: CPT

## 2023-12-28 PROCEDURE — G0378 HOSPITAL OBSERVATION PER HR: HCPCS

## 2023-12-28 PROCEDURE — 85027 COMPLETE CBC AUTOMATED: CPT | Performed by: OBSTETRICS & GYNECOLOGY

## 2023-12-28 PROCEDURE — 84156 ASSAY OF PROTEIN URINE: CPT | Performed by: OBSTETRICS & GYNECOLOGY

## 2023-12-28 PROCEDURE — 80053 COMPREHEN METABOLIC PANEL: CPT | Performed by: OBSTETRICS & GYNECOLOGY

## 2023-12-28 PROCEDURE — 25010000002 BETAMETHASONE ACET & SOD PHOS PER 4 MG: Performed by: OBSTETRICS & GYNECOLOGY

## 2023-12-28 PROCEDURE — 0502F SUBSEQUENT PRENATAL CARE: CPT | Performed by: OBSTETRICS & GYNECOLOGY

## 2023-12-28 PROCEDURE — 99221 1ST HOSP IP/OBS SF/LOW 40: CPT | Performed by: OBSTETRICS & GYNECOLOGY

## 2023-12-28 PROCEDURE — 82570 ASSAY OF URINE CREATININE: CPT | Performed by: OBSTETRICS & GYNECOLOGY

## 2023-12-28 PROCEDURE — 59025 FETAL NON-STRESS TEST: CPT | Performed by: OBSTETRICS & GYNECOLOGY

## 2023-12-28 RX ORDER — PRENATAL VIT/IRON FUM/FOLIC AC 27MG-0.8MG
1 TABLET ORAL DAILY
Status: DISCONTINUED | OUTPATIENT
Start: 2023-12-29 | End: 2023-12-31

## 2023-12-28 RX ORDER — BUTALBITAL, ACETAMINOPHEN AND CAFFEINE 50; 325; 40 MG/1; MG/1; MG/1
1 TABLET ORAL EVERY 6 HOURS PRN
Status: DISCONTINUED | OUTPATIENT
Start: 2023-12-28 | End: 2024-01-01

## 2023-12-28 RX ORDER — METOCLOPRAMIDE HYDROCHLORIDE 5 MG/ML
10 INJECTION INTRAMUSCULAR; INTRAVENOUS EVERY 8 HOURS PRN
Status: DISCONTINUED | OUTPATIENT
Start: 2023-12-28 | End: 2024-01-04 | Stop reason: HOSPADM

## 2023-12-28 RX ORDER — SODIUM CHLORIDE 0.9 % (FLUSH) 0.9 %
10 SYRINGE (ML) INJECTION EVERY 12 HOURS SCHEDULED
Status: DISCONTINUED | OUTPATIENT
Start: 2023-12-28 | End: 2023-12-31

## 2023-12-28 RX ORDER — SODIUM CHLORIDE 0.9 % (FLUSH) 0.9 %
10 SYRINGE (ML) INJECTION AS NEEDED
Status: DISCONTINUED | OUTPATIENT
Start: 2023-12-28 | End: 2023-12-31

## 2023-12-28 RX ORDER — BETAMETHASONE SODIUM PHOSPHATE AND BETAMETHASONE ACETATE 3; 3 MG/ML; MG/ML
12 INJECTION, SUSPENSION INTRA-ARTICULAR; INTRALESIONAL; INTRAMUSCULAR; SOFT TISSUE EVERY 24 HOURS
Status: COMPLETED | OUTPATIENT
Start: 2023-12-28 | End: 2023-12-29

## 2023-12-28 RX ADMIN — BUTALBITAL, ACETAMINOPHEN AND CAFFEINE 1 TABLET: 325; 50; 40 TABLET ORAL at 21:12

## 2023-12-28 RX ADMIN — MAGNESIUM OXIDE 400 MG (241.3 MG MAGNESIUM) TABLET 400 MG: TABLET at 22:36

## 2023-12-28 RX ADMIN — BETAMETHASONE SODIUM PHOSPHATE AND BETAMETHASONE ACETATE 12 MG: 3; 3 INJECTION, SUSPENSION INTRA-ARTICULAR; INTRALESIONAL; INTRAMUSCULAR at 12:01

## 2023-12-28 NOTE — PROGRESS NOTES
"Cc:  Pregnancy follow up.  Patient with a headache that began last Friday; headache was initially severe and she tried to \"sleep it off\" in a dark room. Headache improved but returned as a dull right sided headache.  Good FM.  No bleeding or spotting.  /90 repeat BP.  Trace protein noted.  Gen - alert and pleasant.  Abdomen - gravid.  Ultrasound with normal growth, BPP, CURT.  A/P:  IUP at 34 weeks with hypertensive disorder of pregnancy.  - Patient had normal labs last week.  She has normal BPP and growth today.  However, will send to labor and delivery for repeat labs, serial blood pressures and more observation.  She will receive steroids.  If she is discharged, she will see me tomorrow for steroids and follow up BP on Tuesday.  Delivery will likely occur at 37 - 38 weeks.  Patient also with possible chronic HTN based on previous BP.  "

## 2023-12-28 NOTE — H&P
"[facility]   HISTORY AND PHYSICAL    Patient Name: Sylvester Clarke  : 1995  MRN: [id number]  Primary Care Physician:  Rusty Talley MD  Date of admission: 2023    Subjective   Subjective     Chief Complaint: headache, elevated BP    History of Present Illness - Patient is a 28 year old  at 34 weeks who is admitted for management of hypertension in pregnancy.  Patient does not have \"formal\" diagnosis of chronic HTN prior to pregnancy; however, she has had elevated blood pressures in pregnancy that suggest chronic HTN, starting mainly in second half of pregnancy.  She had evaluation last week with normal CBC, CMP and Protein/Creatinine ratio.  However, she reported a headache that began last weekend that was severe, but resolved spontaneously.  She had a 1 to 2 respite in symptoms before a mild frontal headache returned.  She has no visual symptoms.  Fetal movement is excellent.  She reports history of intermittent severe headaches during nonpregnant state, several times per year.  Ultrasound with growth and BPP were normal today.    Review of Systems   Constitutional:  Negative for chills and fever.   Eyes:  Negative for photophobia and visual disturbance.   Respiratory:  Negative for chest tightness.    Cardiovascular:  Negative for chest pain.   Neurological:  Positive for headaches. Negative for light-headedness.        Personal History     Past Medical History:   Diagnosis Date    Chlamydia 2016    COVID-19 2023    2022 10/2021-Vaxxed and Boosted       Past Surgical History:   Procedure Laterality Date    CYSTECTOMY  2015    Lower Back    FINGER SURGERY Left     Pinky    WISDOM TOOTH EXTRACTION         Family History: family history includes Diabetes in her maternal grandfather, maternal grandmother, and sister; Other in her father; Thyroid cancer in her paternal grandmother. Otherwise pertinent FHx was reviewed and not pertinent to current issue.    Social " History:  reports that she has never smoked. She has never been exposed to tobacco smoke. She does not have any smokeless tobacco history on file. She reports that she does not currently use alcohol. She reports that she does not use drugs.    Home Medications:  diphenhydrAMINE, hydrOXYzine, methylPREDNISolone, prenatal vitamin, and promethazine    Allergies:  No Known Allergies    Objective    Objective     Vitals:   Temp:  [98 °F (36.7 °C)] 98 °F (36.7 °C)  Heart Rate:  [80] 80  Resp:  [16] 16  BP: (135-154)/(84-99) 135/85    Physical Exam  Vitals reviewed.   Constitutional:       Appearance: Normal appearance.   Cardiovascular:      Rate and Rhythm: Normal rate and regular rhythm.   Musculoskeletal:      Right lower leg: Edema present.      Left lower leg: Edema present.   Neurological:      General: No focal deficit present.      Mental Status: She is alert.      Motor: No weakness.      Deep Tendon Reflexes: Reflexes normal.   Psychiatric:         Mood and Affect: Mood normal.         Behavior: Behavior normal.         Thought Content: Thought content normal.         Judgment: Judgment normal.         Result Review      Protein/Creatinine Ratio, Urine 347.3 High  mg/G Crea   Creatinine, Urine 45.2 mg/dL   Total Protein, Urine 15.7 mg/dL     WBC 7.99 10*3/mm3   RBC 4.03 10*6/mm3   Hemoglobin 10.7 Low  g/dL   Hematocrit 34.3 %   MCV 85.1 fL   MCH 26.6 pg   MCHC 31.2 Low  g/dL   RDW 14.3 %   RDW-SD 44.0 fl   MPV 9.8 fL   Platelets 151 10*3/mm3     Glucose 110 High  mg/dL   BUN 7 mg/dL   Creatinine 0.62 mg/dL   Sodium 138 mmol/L   Potassium 3.7 mmol/L   Chloride 107 mmol/L   CO2 18.0 Low  mmol/L   Calcium 9.0 mg/dL   Total Protein 6.0 g/dL   Albumin 3.8 g/dL   ALT (SGPT) 36 High  U/L   AST (SGOT) 35 High  U/L   Alkaline Phosphatase 180 High  U/L   Total Bilirubin 0.2 mg/dL   Globulin 2.2 gm/dL   A/G Ratio 1.7 g/dL   BUN/Creatinine Ratio 11.3    Anion Gap 13.0 mmol/L   eGFR 126.1 mL/min/1.73     Assessment & Plan    Assessment / Plan     Brief Patient Summary:  Sylvester Clarke is a 28 y.o. female primigravid at 34 weeks with likely pre-eclampsia.    Active Hospital Problems:  Active Hospital Problems    Diagnosis     **Pregnancy      Plan:   - Patient should have observation in hospital.  She will be given steroids.  She reports that she has a headache, but on further review, she has severe headache in non-pregnant state 4 or so times per year.   She does have new lab abnormalities including elevation in protein/creatinine ratio and borderline elevations in LFTs.  Will do 24 hour urine and repeat labs in am.  She will have intermittent fetal monitoring.  If patient remains stable, she can likely be managed as outpatient with deliver at 37 weeks.    DVT prophylaxis:  No DVT prophylaxis order currently exists.    CODE STATUS:       Admission Status:  I believe this patient meets inpatient status.    Rusty Seals MD

## 2023-12-28 NOTE — PLAN OF CARE
Problem: Adult Inpatient Plan of Care  Goal: Plan of Care Review  Outcome: Ongoing, Progressing  Flowsheets (Taken 12/28/2023 7274)  Plan of Care Reviewed With: patient  Outcome Evaluation: patient admitted to antepartum unit for blood pressure monitoring, headache, 24 hour urine collection and lab collection in the morning. NST BID   Goal Outcome Evaluation:  Plan of Care Reviewed With: patient           Outcome Evaluation: patient admitted to antepartum unit for blood pressure monitoring, headache, 24 hour urine collection and lab collection in the morning. NST BID

## 2023-12-29 LAB
ABO GROUP BLD: NORMAL
ALBUMIN SERPL-MCNC: 3.7 G/DL (ref 3.5–5.2)
ALBUMIN/GLOB SERPL: 1.3 G/DL
ALP SERPL-CCNC: 171 U/L (ref 39–117)
ALT SERPL W P-5'-P-CCNC: 37 U/L (ref 1–33)
ANION GAP SERPL CALCULATED.3IONS-SCNC: 14.2 MMOL/L (ref 5–15)
AST SERPL-CCNC: 34 U/L (ref 1–32)
BASOPHILS # BLD AUTO: 0.02 10*3/MM3 (ref 0–0.2)
BASOPHILS NFR BLD AUTO: 0.1 % (ref 0–1.5)
BILIRUB SERPL-MCNC: 0.2 MG/DL (ref 0–1.2)
BLD GP AB SCN SERPL QL: NEGATIVE
BUN SERPL-MCNC: 8 MG/DL (ref 6–20)
BUN/CREAT SERPL: 13.3 (ref 7–25)
CALCIUM SPEC-SCNC: 9.5 MG/DL (ref 8.6–10.5)
CHLORIDE SERPL-SCNC: 107 MMOL/L (ref 98–107)
CO2 SERPL-SCNC: 16.8 MMOL/L (ref 22–29)
COLLECT DURATION TIME UR: 24 HRS
CREAT SERPL-MCNC: 0.6 MG/DL (ref 0.57–1)
DEPRECATED RDW RBC AUTO: 44.7 FL (ref 37–54)
EGFRCR SERPLBLD CKD-EPI 2021: 127.1 ML/MIN/1.73
EOSINOPHIL # BLD AUTO: 0 10*3/MM3 (ref 0–0.4)
EOSINOPHIL NFR BLD AUTO: 0 % (ref 0.3–6.2)
ERYTHROCYTE [DISTWIDTH] IN BLOOD BY AUTOMATED COUNT: 14.8 % (ref 12.3–15.4)
GLOBULIN UR ELPH-MCNC: 2.8 GM/DL
GLUCOSE SERPL-MCNC: 99 MG/DL (ref 65–99)
HCT VFR BLD AUTO: 34.5 % (ref 34–46.6)
HGB BLD-MCNC: 11.7 G/DL (ref 12–15.9)
IMM GRANULOCYTES # BLD AUTO: 0.17 10*3/MM3 (ref 0–0.05)
IMM GRANULOCYTES NFR BLD AUTO: 1 % (ref 0–0.5)
LYMPHOCYTES # BLD AUTO: 1.4 10*3/MM3 (ref 0.7–3.1)
LYMPHOCYTES NFR BLD AUTO: 8 % (ref 19.6–45.3)
MCH RBC QN AUTO: 28.6 PG (ref 26.6–33)
MCHC RBC AUTO-ENTMCNC: 33.9 G/DL (ref 31.5–35.7)
MCV RBC AUTO: 84.4 FL (ref 79–97)
MONOCYTES # BLD AUTO: 1.1 10*3/MM3 (ref 0.1–0.9)
MONOCYTES NFR BLD AUTO: 6.3 % (ref 5–12)
NEUTROPHILS NFR BLD AUTO: 14.77 10*3/MM3 (ref 1.7–7)
NEUTROPHILS NFR BLD AUTO: 84.6 % (ref 42.7–76)
NRBC BLD AUTO-RTO: 0.2 /100 WBC (ref 0–0.2)
PLATELET # BLD AUTO: 175 10*3/MM3 (ref 140–450)
PMV BLD AUTO: 11.1 FL (ref 6–12)
POTASSIUM SERPL-SCNC: 4.5 MMOL/L (ref 3.5–5.2)
PROT 24H UR-MRATE: 374.4 MG/24HOURS (ref 0–150)
PROT SERPL-MCNC: 6.5 G/DL (ref 6–8.5)
RBC # BLD AUTO: 4.09 10*6/MM3 (ref 3.77–5.28)
RH BLD: POSITIVE
SODIUM SERPL-SCNC: 138 MMOL/L (ref 136–145)
SPECIMEN VOL 24H UR: 3200 ML
T&S EXPIRATION DATE: NORMAL
WBC NRBC COR # BLD AUTO: 17.46 10*3/MM3 (ref 3.4–10.8)

## 2023-12-29 PROCEDURE — 84156 ASSAY OF PROTEIN URINE: CPT | Performed by: OBSTETRICS & GYNECOLOGY

## 2023-12-29 PROCEDURE — 86850 RBC ANTIBODY SCREEN: CPT | Performed by: OBSTETRICS & GYNECOLOGY

## 2023-12-29 PROCEDURE — G0378 HOSPITAL OBSERVATION PER HR: HCPCS

## 2023-12-29 PROCEDURE — 85025 COMPLETE CBC W/AUTO DIFF WBC: CPT | Performed by: OBSTETRICS & GYNECOLOGY

## 2023-12-29 PROCEDURE — 86900 BLOOD TYPING SEROLOGIC ABO: CPT | Performed by: OBSTETRICS & GYNECOLOGY

## 2023-12-29 PROCEDURE — 25010000002 BETAMETHASONE ACET & SOD PHOS PER 4 MG: Performed by: OBSTETRICS & GYNECOLOGY

## 2023-12-29 PROCEDURE — 86923 COMPATIBILITY TEST ELECTRIC: CPT

## 2023-12-29 PROCEDURE — 59025 FETAL NON-STRESS TEST: CPT

## 2023-12-29 PROCEDURE — 59025 FETAL NON-STRESS TEST: CPT | Performed by: OBSTETRICS & GYNECOLOGY

## 2023-12-29 PROCEDURE — 99233 SBSQ HOSP IP/OBS HIGH 50: CPT | Performed by: OBSTETRICS & GYNECOLOGY

## 2023-12-29 PROCEDURE — 80053 COMPREHEN METABOLIC PANEL: CPT | Performed by: OBSTETRICS & GYNECOLOGY

## 2023-12-29 PROCEDURE — 81050 URINALYSIS VOLUME MEASURE: CPT | Performed by: OBSTETRICS & GYNECOLOGY

## 2023-12-29 PROCEDURE — 86901 BLOOD TYPING SEROLOGIC RH(D): CPT | Performed by: OBSTETRICS & GYNECOLOGY

## 2023-12-29 RX ADMIN — Medication 10 ML: at 09:03

## 2023-12-29 RX ADMIN — BUTALBITAL, ACETAMINOPHEN AND CAFFEINE 1 TABLET: 325; 50; 40 TABLET ORAL at 05:12

## 2023-12-29 RX ADMIN — MAGNESIUM OXIDE 400 MG (241.3 MG MAGNESIUM) TABLET 400 MG: TABLET at 20:33

## 2023-12-29 RX ADMIN — BETAMETHASONE SODIUM PHOSPHATE AND BETAMETHASONE ACETATE 12 MG: 3; 3 INJECTION, SUSPENSION INTRA-ARTICULAR; INTRALESIONAL; INTRAMUSCULAR at 12:18

## 2023-12-29 RX ADMIN — Medication 1 TABLET: at 09:03

## 2023-12-29 RX ADMIN — Medication 10 ML: at 20:34

## 2023-12-29 NOTE — NON STRESS TEST
Sylvester Clarke, a  at 34w1d with an SUZY of 2024, by Last Menstrual Period, was seen at [facility] ANTEPARTUM UNIT for a nonstress test.    Chief Complaint   Patient presents with    Elevated Blood Pressure     Patient presents to labor and delivery triage from the MD office for a blood pressure check and steriods       Patient Active Problem List   Diagnosis    Pre-eclampsia in third trimester       Start Time: 724  Stop Time: 759    Interpretation A  Nonstress Test Interpretation A: Reactive  Comments A: Appropriate for gestational age.

## 2023-12-29 NOTE — NURSING NOTE
Dr. Paredes called on cell phone. Notified him of patient having headache of 6 out of 10 on pain scale. Order received for fiorecet 1 tablet every 6 hours PRN for headache and orders for cbc, cmp and type and screen for the morning. Blood pressure on admission to antepartum is 151/98. R/v

## 2023-12-29 NOTE — PLAN OF CARE
Problem: Adult Inpatient Plan of Care  Goal: Plan of Care Review  Outcome: Ongoing, Progressing  Goal: Patient-Specific Goal (Individualized)  Outcome: Ongoing, Progressing  Goal: Absence of Hospital-Acquired Illness or Injury  Outcome: Ongoing, Progressing  Intervention: Identify and Manage Fall Risk  Intervention: Prevent and Manage VTE (Venous Thromboembolism) Risk  Goal: Optimal Comfort and Wellbeing  Outcome: Ongoing, Progressing  Intervention: Monitor Pain and Promote Comfort  Intervention: Provide Person-Centered Care  Goal: Readiness for Transition of Care  Outcome: Ongoing, Progressing     Problem: Maternal-Fetal Wellbeing  Goal: Optimal Maternal-Fetal Wellbeing  Outcome: Ongoing, Progressing  Intervention: Promote and Monitor Maternal-Fetal Wellbeing  Intervention: Support Psychosocial Response to Complications During Pregnancy     Problem: Hypertensive Disorders in Pregnancy  Goal: Maternal-Fetal Stabilization  Outcome: Ongoing, Progressing  Intervention: Optimize Blood Pressure and Fluid Status  Intervention: Monitor and Manage Symptom Progression   Goal Outcome Evaluation:  Plan of Care Reviewed With: patient, significant other        Progress: no change  Outcome Evaluation: VSS-no severe range pressures. C/o ha that is relieved by Fiorcet. Pt is anxious. Please spend much time educating as pt and s/o are very receptive. RNST, +Fm. Denies epigastric pain/visual changes/lof/vb/ctx.

## 2023-12-29 NOTE — NON STRESS TEST
Sylvester Clarke, a  at 34w1d with an USZY of 2024, by Last Menstrual Period, was seen at [facility] ANTEPARTUM UNIT for a nonstress test.    Chief Complaint   Patient presents with    Elevated Blood Pressure     Patient presents to labor and delivery triage from the MD office for a blood pressure check and steriods       Patient Active Problem List   Diagnosis    Pre-eclampsia in third trimester       Start Time:   Stop Time:     Interpretation A  Nonstress Test Interpretation A: Reactive

## 2023-12-29 NOTE — PLAN OF CARE
Problem: Adult Inpatient Plan of Care  Goal: Plan of Care Review  12/29/2023 1601 by Rhonda Apple RN  Outcome: Ongoing, Progressing  12/29/2023 0755 by Rhonda Apple RN  Outcome: Ongoing, Progressing  Goal: Patient-Specific Goal (Individualized)  12/29/2023 1601 by Rhonda Apple RN  Outcome: Ongoing, Progressing  12/29/2023 0755 by Rhonda Apple RN  Outcome: Ongoing, Progressing  Goal: Absence of Hospital-Acquired Illness or Injury  12/29/2023 1601 by Rhonda Apple RN  Outcome: Ongoing, Progressing  12/29/2023 0755 by Rhonda Apple RN  Outcome: Ongoing, Progressing  Intervention: Identify and Manage Fall Risk  Recent Flowsheet Documentation  Taken 12/29/2023 0804 by Rhonda Apple RN  Safety Promotion/Fall Prevention:   fall prevention program maintained   nonskid shoes/slippers when out of bed  Intervention: Prevent Skin Injury  Recent Flowsheet Documentation  Taken 12/29/2023 0804 by Rhonda Apple RN  Body Position: position changed independently  Intervention: Prevent and Manage VTE (Venous Thromboembolism) Risk  Recent Flowsheet Documentation  Taken 12/29/2023 0804 by Rhonda Apple RN  VTE Prevention/Management:   bilateral   sequential compression devices on  Range of Motion: active ROM (range of motion) encouraged  Goal: Optimal Comfort and Wellbeing  12/29/2023 1601 by Rhonda Apple RN  Outcome: Ongoing, Progressing  12/29/2023 0755 by Rhonda Apple RN  Outcome: Ongoing, Progressing  Intervention: Provide Person-Centered Care  Recent Flowsheet Documentation  Taken 12/29/2023 0804 by Rhonda Apple RN  Trust Relationship/Rapport:   care explained   choices provided   emotional support provided   empathic listening provided   questions answered   questions encouraged   reassurance provided   thoughts/feelings acknowledged  Goal: Readiness for Transition of Care  12/29/2023 1601 by Rhonda Apple RN  Outcome: Ongoing, Progressing  12/29/2023 0755 by Ambika  CARLIN Ellis  Outcome: Ongoing, Progressing     Problem: Maternal-Fetal Wellbeing  Goal: Optimal Maternal-Fetal Wellbeing  12/29/2023 1601 by Rhonda Apple RN  Outcome: Ongoing, Progressing  12/29/2023 0755 by Rhonda Apple RN  Outcome: Ongoing, Progressing  Intervention: Support Psychosocial Response to Complications During Pregnancy  Recent Flowsheet Documentation  Taken 12/29/2023 0804 by Rhonda Apple RN  Family/Support System Care:   presence promoted   self-care encouraged   support provided     Problem: Hypertensive Disorders in Pregnancy  Goal: Maternal-Fetal Stabilization  12/29/2023 1601 by Rhonda Apple RN  Outcome: Ongoing, Progressing  12/29/2023 0755 by Rhonda Apple RN  Outcome: Ongoing, Progressing   Goal Outcome Evaluation:      POC reviewed and updated throughout shift as needed. VSS. Pt denies HA or blurred vision. +FM/ NST reactive cat 1.

## 2023-12-29 NOTE — PROGRESS NOTES
[facility]     Progress Note    Patient Name: Sylvester Clarke  : 1995  MRN: [id number]  Primary Care Physician:  Rusty Talley MD  Date of admission: 2023    Subjective   Subjective     Chief Complaint: Elevated blood pressures    HPI:  Patient Reports feeling anxious/nervous this morning.  She does report resolution of the headache that she was experiencing yesterday and last night.  She reports active fetal movement.  Denies vaginal bleeding or leakage of fluid.    Objective   Objective     Vitals:   Temp:  [97.9 °F (36.6 °C)-98 °F (36.7 °C)] 97.9 °F (36.6 °C)  Heart Rate:  [62-74] 73  Resp:  [16-18] 16  BP: (127-157)/() 147/94  Physical Exam     General: No acute distress, awake and oriented x3  Abd: Gravid, soft, nontender  Psychiatric: good judgment and insight, normal mood  Neurological: cranial nerves II through XII intact, no deficits      Result Review    Result Review:  I have personally reviewed the results from the time of this admission to 2023 11:52 EST and agree with these findings:  [x]  Laboratory list / accordion  []  Microbiology  [x]  Radiology  []  EKG/Telemetry   []  Cardiology/Vascular   []  Pathology  []  Old records  [x]  Other: NST  Most notable findings include:     Lab Results   Component Value Date    WBC 17.46 (H) 2023    HGB 11.7 (L) 2023    HCT 34.5 2023    MCV 84.4 2023     2023     Lab Results   Component Value Date    GLUCOSE 99 2023    BUN 8 2023    CREATININE 0.60 2023    EGFRRESULT 124.7 12/15/2023    EGFR 127.1 2023    BCR 13.3 2023    K 4.5 2023    CO2 16.8 (L) 2023    CALCIUM 9.5 2023    PROTENTOTREF 6.3 12/15/2023    ALBUMIN 3.7 2023    BILITOT 0.2 2023    AST 34 (H) 2023    ALT 37 (H) 2023     24 hour urine protein:      Component  Ref Range & Units 10:34   Protein, 24H Urine  0.0 - 150.0 mg/24hours 374.4 High    24H Urine  Volume  mL 3,200   Time (Hours)  hrs 24        NST:  120/reactive/moderate variability/1 mild variable deceleration noted  Tocometry: No contractions    Most recent ultrasound (2023):  Intrauterine pregnancy at 34w0d  Placental location: Posterior  Normal Interval growth  EFW:35.9%ile, AC:27.7%ile  Estimated fetal weight:  2206 g  Fetal position: Vertex  Biophysical profile: Reassuring    CURT: 11.7 cm  Fetal heart rate: 146 bpm      Assessment & Plan   Assessment / Plan     Brief Patient Summary:  Sylvester Clarke is a 28 y.o. female  1 at 34-1/7 weeks gestational age with preeclampsia without severe features  2.  Mild elevation of transaminases, not sufficiently elevated to be diagnostic of severe preeclampsia    Active Hospital Problems:  Active Hospital Problems    Diagnosis     **Pre-eclampsia in third trimester      Plan:    1.  Patient was admitted to the hospital yesterday for elevated blood pressure and mildly elevated transaminases.  Her blood pressures have been rather consistently in the mild hypertensive range (range of 127-157/) since admission.  Her liver function test this morning are similar to yesterday, only slightly elevated but not 2 times the upper limit of normal as would be necessary to meet diagnostic criteria for severe preeclampsia.  It is unclear with the origin of this bump in her liver function tests are.  Patient has had a slight increase in her proteinuria.  24-hour urine protein today is 374.  This was similar to her protein: Creatinine ratio that was performed yesterday.  This represents an increase in her protein: Creatinine ratio performed 2 weeks ago of 142.  I believe in totality these findings are consistent with preeclampsia, rather than just gestational hypertension or previously undiagnosed chronic hypertension (patient had no known previous history of chronic hypertension, she did have a blood pressure elevation of 144/82 at 19 weeks and 0 days).  2.   Given the labile nature of preeclampsia, my recommendation would be for continued inpatient observation for at least the next 1-2 days to be sure that her blood pressures and other labs remained stable.  If there are any signs of the development of severe preeclampsia (persistent severe range blood pressures, increased liver function test to 2 times normal, thrombocytopenia, elevated creatinine, etc.) I would recommend delivery as the patient is now 34 weeks gestation.  Otherwise, if findings remain consistent with preeclampsia with mild features, delivery would be recommended at 37 weeks.  3.  We will repeat her preeclampsia labs tomorrow.  We will also add hepatitis panel to exclude this as a source of her bump in liver function test.  4.  I had a long conversation with the patient and her partner today regarding these findings and my recommendations.  She was very anxious regarding her medical problems.  I do feel that there is a possibility that her anxiety may be playing a role in her hypertensive episodes.  Patient also had concerns about returning to work.  I explained that given blood pressure elevations, it would likely be recommended to remain off of work for the duration of the pregnancy.  Patient was rather distraught about this, and she feels that not going to work would be more stressful to her.  Patient works as a  for [employer], and due to the winter break, she is not scheduled to return to work until 1/8/2024 anyway.  Reassurance offered to the patient that we do not need to make this decision at this time, and we can readdress the issue of her returning to work on that date approaches.    I spent 50 minutes on the floor in the care of this patient (reviewing the chart, review of any pertinent imaging and labs, consult other physicians, direct patient care and counseling) excluding any time spent on other separate billable services such as performing procedures or test interpretation,  if applicable.     DVT prophylaxis:  Mechanical DVT prophylaxis orders are present.    CODE STATUS:   Code Status (Patient has no pulse and is not breathing): CPR (Attempt to Resuscitate)  Medical Interventions (Patient has pulse or is breathing): Full    Disposition:  I expect patient to be discharged in 1-3 days.    Tim Paredes MD

## 2023-12-30 LAB
ALBUMIN SERPL-MCNC: 3.8 G/DL (ref 3.5–5.2)
ALBUMIN/GLOB SERPL: 1.5 G/DL
ALP SERPL-CCNC: 168 U/L (ref 39–117)
ALT SERPL W P-5'-P-CCNC: 34 U/L (ref 1–33)
ANION GAP SERPL CALCULATED.3IONS-SCNC: 12.9 MMOL/L (ref 5–15)
AST SERPL-CCNC: 34 U/L (ref 1–32)
BASOPHILS # BLD AUTO: 0.02 10*3/MM3 (ref 0–0.2)
BASOPHILS NFR BLD AUTO: 0.1 % (ref 0–1.5)
BILIRUB SERPL-MCNC: <0.2 MG/DL (ref 0–1.2)
BUN SERPL-MCNC: 9 MG/DL (ref 6–20)
BUN/CREAT SERPL: 14.5 (ref 7–25)
CALCIUM SPEC-SCNC: 9.2 MG/DL (ref 8.6–10.5)
CHLORIDE SERPL-SCNC: 105 MMOL/L (ref 98–107)
CO2 SERPL-SCNC: 19.1 MMOL/L (ref 22–29)
CREAT SERPL-MCNC: 0.62 MG/DL (ref 0.57–1)
DEPRECATED RDW RBC AUTO: 46.9 FL (ref 37–54)
EGFRCR SERPLBLD CKD-EPI 2021: 126.1 ML/MIN/1.73
EOSINOPHIL # BLD AUTO: 0 10*3/MM3 (ref 0–0.4)
EOSINOPHIL NFR BLD AUTO: 0 % (ref 0.3–6.2)
ERYTHROCYTE [DISTWIDTH] IN BLOOD BY AUTOMATED COUNT: 14.9 % (ref 12.3–15.4)
GLOBULIN UR ELPH-MCNC: 2.6 GM/DL
GLUCOSE SERPL-MCNC: 112 MG/DL (ref 65–99)
HCT VFR BLD AUTO: 33.4 % (ref 34–46.6)
HGB BLD-MCNC: 11 G/DL (ref 12–15.9)
IMM GRANULOCYTES # BLD AUTO: 0.23 10*3/MM3 (ref 0–0.05)
IMM GRANULOCYTES NFR BLD AUTO: 1.3 % (ref 0–0.5)
LYMPHOCYTES # BLD AUTO: 1.44 10*3/MM3 (ref 0.7–3.1)
LYMPHOCYTES NFR BLD AUTO: 8.2 % (ref 19.6–45.3)
MCH RBC QN AUTO: 28.3 PG (ref 26.6–33)
MCHC RBC AUTO-ENTMCNC: 32.9 G/DL (ref 31.5–35.7)
MCV RBC AUTO: 85.9 FL (ref 79–97)
MONOCYTES # BLD AUTO: 1.19 10*3/MM3 (ref 0.1–0.9)
MONOCYTES NFR BLD AUTO: 6.8 % (ref 5–12)
NEUTROPHILS NFR BLD AUTO: 14.59 10*3/MM3 (ref 1.7–7)
NEUTROPHILS NFR BLD AUTO: 83.6 % (ref 42.7–76)
NRBC BLD AUTO-RTO: 0.3 /100 WBC (ref 0–0.2)
PLATELET # BLD AUTO: 169 10*3/MM3 (ref 140–450)
PMV BLD AUTO: 10.7 FL (ref 6–12)
POTASSIUM SERPL-SCNC: 4.4 MMOL/L (ref 3.5–5.2)
PROT SERPL-MCNC: 6.4 G/DL (ref 6–8.5)
RBC # BLD AUTO: 3.89 10*6/MM3 (ref 3.77–5.28)
SODIUM SERPL-SCNC: 137 MMOL/L (ref 136–145)
WBC NRBC COR # BLD AUTO: 17.47 10*3/MM3 (ref 3.4–10.8)

## 2023-12-30 PROCEDURE — 85025 COMPLETE CBC W/AUTO DIFF WBC: CPT | Performed by: OBSTETRICS & GYNECOLOGY

## 2023-12-30 PROCEDURE — 80053 COMPREHEN METABOLIC PANEL: CPT | Performed by: OBSTETRICS & GYNECOLOGY

## 2023-12-30 PROCEDURE — 59025 FETAL NON-STRESS TEST: CPT

## 2023-12-30 PROCEDURE — 99232 SBSQ HOSP IP/OBS MODERATE 35: CPT | Performed by: OBSTETRICS & GYNECOLOGY

## 2023-12-30 PROCEDURE — G0378 HOSPITAL OBSERVATION PER HR: HCPCS

## 2023-12-30 PROCEDURE — 59025 FETAL NON-STRESS TEST: CPT | Performed by: OBSTETRICS & GYNECOLOGY

## 2023-12-30 RX ADMIN — BUTALBITAL, ACETAMINOPHEN AND CAFFEINE 1 TABLET: 325; 50; 40 TABLET ORAL at 11:26

## 2023-12-30 RX ADMIN — BUTALBITAL, ACETAMINOPHEN AND CAFFEINE 1 TABLET: 325; 50; 40 TABLET ORAL at 16:46

## 2023-12-30 RX ADMIN — Medication 10 ML: at 08:58

## 2023-12-30 RX ADMIN — Medication 10 ML: at 20:42

## 2023-12-30 RX ADMIN — MAGNESIUM OXIDE 400 MG (241.3 MG MAGNESIUM) TABLET 400 MG: TABLET at 20:41

## 2023-12-30 RX ADMIN — Medication 1 TABLET: at 08:58

## 2023-12-30 RX ADMIN — BUTALBITAL, ACETAMINOPHEN AND CAFFEINE 1 TABLET: 325; 50; 40 TABLET ORAL at 09:00

## 2023-12-30 NOTE — PROGRESS NOTES
" [facility]     Progress Note    Patient Name: Sylvester Clarke  : 1995  MRN: [id number]  Primary Care Physician:  Rusty Talley MD  Date of admission: 2023     #3     Subjective   Subjective     Chief Complaint:   Chief Complaint   Patient presents with    Elevated Blood Pressure     Patient presents to labor and delivery triage from the MD office for a blood pressure check and steriods     History of Present Illness  28 y.o.  @ 34w2d - Admitted on Thursday from office by primary OB Dr. Seals for evaluation of hypertensive disorder in pregnancy. Hospital course notable for labile blood pressures, mild headaches that respond to treatment. Also has had borderline elevation in transaminases for the last two weeks, but not 2 x normal as criteria for severe elevation. Does have prior \"hx\" of chronic hypertension, but was not on medication during pregnancy but did have elevated systolic blood pressure at 19 and 24 weeks (not treatment level). Today with good fetal movement, no cramping or bleeding, no leaking. Notes headache as above, no vision change or RUQ pain.     Review of Systems   Constitutional:  Negative for fever.   Eyes:  Negative for visual disturbance.   Respiratory:  Negative for shortness of breath.    Cardiovascular:  Negative for chest pain.   Gastrointestinal:  Negative for abdominal pain.   Genitourinary:  Negative for pelvic pain, vaginal bleeding and vaginal discharge.   Neurological:  Positive for headaches.   All other systems reviewed and are negative.      Objective   Objective     Vitals:   Temp:  [97.9 °F (36.6 °C)-98.1 °F (36.7 °C)] 97.9 °F (36.6 °C)  Heart Rate:  [68-79] 68  Resp:  [16] 16  BP: (129-146)/(83-87) 129/86    Physical Exam  Vitals reviewed.   Constitutional:       General: She is not in acute distress.     Appearance: Normal appearance. She is normal weight.   Pulmonary:      Effort: Pulmonary effort is normal. No respiratory distress. "   Abdominal:      General: There is distension (c/w 34 weeks).      Palpations: Abdomen is soft.      Tenderness: There is no abdominal tenderness.   Musculoskeletal:         General: No tenderness.      Right lower leg: Edema (trace edema) present.      Left lower leg: Edema present.   Skin:     General: Skin is warm and dry.   Neurological:      General: No focal deficit present.      Mental Status: She is alert.      Deep Tendon Reflexes: Reflexes normal (2+ DTRs, no clonus).          Result Review      Lab Results   Component Value Date    WBC 17.47 (H) 2023    HGB 11.0 (L) 2023    HCT 33.4 (L) 2023    MCV 85.9 2023     2023     Lab Results   Component Value Date    GLUCOSE 112 (H) 2023    BUN 9 2023    CREATININE 0.62 2023    EGFRRESULT 124.7 12/15/2023    EGFR 126.1 2023    BCR 14.5 2023    K 4.4 2023    CO2 19.1 (L) 2023    CALCIUM 9.2 2023    PROTENTOTREF 6.3 12/15/2023    ALBUMIN 3.8 2023    BILITOT <0.2 2023    AST 34 (H) 2023    ALT 34 (H) 2023     Ultrasound in office.   Cephalic,   AGA with 36%ile overall, A/C 28%ile   BPP 8/8  Normal CURT    24 hour urine 374 mg protein       Assessment & Plan   Assessment / Plan     Brief Patient Summary:  Sylvester Clarke is a 28 y.o. female  @ 34w2d   1) Pre-eclampsia - currently without evidence of severe features. Discussed situation at length, she is stable since admission, strongly desires discharge home. We have tested a number of times and her overall picture is stable at this time. Primary concern with her desire for discharge is that she does complain of headache that has persisted this morning despite a dose of Fioricet.        Active Hospital Problems:  Active Hospital Problems    Diagnosis     **Pre-eclampsia in third trimester      Plan:   - reviewed criteria for severe features- 1) persistent, worsening headache unresponsive to treatment,  2) visual changes, 3) RUQ pain consistent with worst heartburn ever - Also Severe range blood pressure elevation   - Reviewed delivery would be 1) anytime she meets criteria for severe features or 2) reaches 37 weeks of pregnancy   - Until further notice, should not work - limited overall activity with no strenuous activity if outpatient   - Repeat labs with Hepatitis panel as Dr. Paredes mentioned and Uric Acid, LDH levels as well in AM.   -  consult     DVT prophylaxis:  Mechanical DVT prophylaxis orders are present.    CODE STATUS:    Code Status (Patient has no pulse and is not breathing): CPR (Attempt to Resuscitate)  Medical Interventions (Patient has pulse or is breathing): Full    Disposition:  I expect patient to be discharged today.    Fernando Ruano MD  2023   10:50 EST

## 2023-12-30 NOTE — CONSULTS
Prenatal Consult    Referring OB:  Bindu  Principal Problem:    Pre-eclampsia in third trimester   Pregnancy 34 weeks completed    Reason for Consultation: potential premature single at 34 weeks gestation    Maternal History:   First last name is a 28 y.o. yr/o  with: hypertension (gestational).  The  EDC is  Estimated Date of Delivery: Estimated Date of Delivery: 24    Consult:  father and mother available for discussion.  We reviewed the fetal and  aspects of the above conditions to include: estimated intact survival rate, respiratory distress syndrome, transient tachypnea of the , beneficial effects of steroids, intraventricular hemorrhage, apnea of prematurity, anemia of prematurity, feeding difficulty, temperature instability, jaundice, and hypoglycemia, estimated length of stay, NICU vistation policy, RSV precautions/vaccine    Estimated length of stay: SUZY  Discussed the importance of providing human milk for pre-term and late pre-term infants: yes, mother plans to provide breast milk and discussed formula and DBM use  Reviewed policies and procedures for NICU/ICN  Reviewed structure and function of ULP-Neonatology     We will plan to attend delivery when requested.    Plan for  resuscitation: full resuscitation    Additional comments: Baby Girl    Thank you for allowing us to participate in the care of your patient.     Rosa Ireland, APRN  23  18:59 EST      35 minutes were spent in consultation, greater than 95% face to face time.

## 2023-12-30 NOTE — NON STRESS TEST
Sylvester Clarke, a  at 34w2d with an SUZY of 2024, by Last Menstrual Period, was seen at [facility] ANTEPARTUM UNIT for a nonstress test.    Chief Complaint   Patient presents with    Elevated Blood Pressure     Patient presents to labor and delivery triage from the MD office for a blood pressure check and steriods       Patient Active Problem List   Diagnosis    Pre-eclampsia in third trimester       Start Time: 1007  Stop Time: 1033    Interpretation A  Nonstress Test Interpretation A: Reactive  Comments A: Appropriate for gestational age.

## 2023-12-30 NOTE — NON STRESS TEST
Sylvester Clarke, a  at 34w1d with an SUZY of 2024, by Last Menstrual Period, was seen at [facility] ANTEPARTUM UNIT for a nonstress test.    Chief Complaint   Patient presents with    Elevated Blood Pressure     Patient presents to labor and delivery triage from the MD office for a blood pressure check and steriods       Patient Active Problem List   Diagnosis    Pre-eclampsia in third trimester       Start Time:   Stop Time:     Interpretation A  Nonstress Test Interpretation A: Reactive  Comments A:              '

## 2023-12-30 NOTE — PLAN OF CARE
Goal Outcome Evaluation:  Plan of Care Reviewed With: patient, significant other        Progress: no change  Outcome Evaluation: VSS and BPs WNL. Patient rates headache 2/10 and denies visual disturbances, vaginal bleeding, LOF/SROM, nausea, vomiting, crampiness, or contractions. Patient declined needing any medication for headache. CMP, CBC w/ Diff drawn this AM. Patient reports positive fetal movement and reactive NST was obtained. Patient is in good spirits. She hoping to get back to work on 1/8/24, however, we discussed trying to remain flexible during these last few weeks of her pregnancy to help ease the stress and anxiety.

## 2023-12-30 NOTE — PLAN OF CARE
Problem: Adult Inpatient Plan of Care  Goal: Plan of Care Review  12/30/2023 1718 by Rhonda Apple RN  Outcome: Ongoing, Progressing  12/30/2023 0745 by Rhonda Apple RN  Outcome: Ongoing, Progressing  Goal: Patient-Specific Goal (Individualized)  12/30/2023 1718 by Rhonda Apple RN  Outcome: Ongoing, Progressing  12/30/2023 0745 by Rhonda Apple RN  Outcome: Ongoing, Progressing  Goal: Absence of Hospital-Acquired Illness or Injury  12/30/2023 1718 by Rhonda Apple RN  Outcome: Ongoing, Progressing  12/30/2023 0745 by Rhonda Apple RN  Outcome: Ongoing, Progressing  Intervention: Identify and Manage Fall Risk  Recent Flowsheet Documentation  Taken 12/30/2023 0856 by Rhonda pAple RN  Safety Promotion/Fall Prevention:   fall prevention program maintained   nonskid shoes/slippers when out of bed  Intervention: Prevent Skin Injury  Recent Flowsheet Documentation  Taken 12/30/2023 0856 by Rhonda Apple RN  Body Position: position changed independently  Intervention: Prevent and Manage VTE (Venous Thromboembolism) Risk  Recent Flowsheet Documentation  Taken 12/30/2023 0856 by Rhonda Apple RN  VTE Prevention/Management: (while in bed.)   bilateral   sequential compression devices on  Range of Motion: active ROM (range of motion) encouraged  Goal: Optimal Comfort and Wellbeing  12/30/2023 1718 by Rhonda Apple RN  Outcome: Ongoing, Progressing  12/30/2023 0745 by Rhonda Apple RN  Outcome: Ongoing, Progressing  Intervention: Monitor Pain and Promote Comfort  Recent Flowsheet Documentation  Taken 12/30/2023 0900 by Rhonda Apple RN  Pain Management Interventions:   pain management plan reviewed with patient/caregiver   see MAR   quiet environment facilitated  Intervention: Provide Person-Centered Care  Recent Flowsheet Documentation  Taken 12/30/2023 0856 by Rhonda Apple RN  Trust Relationship/Rapport:   care explained   choices provided   emotional support provided    empathic listening provided   questions answered   questions encouraged   reassurance provided   thoughts/feelings acknowledged  Goal: Readiness for Transition of Care  12/30/2023 1718 by Rhonda Apple RN  Outcome: Ongoing, Progressing  12/30/2023 0745 by Rhonda Apple RN  Outcome: Ongoing, Progressing     Problem: Maternal-Fetal Wellbeing  Goal: Optimal Maternal-Fetal Wellbeing  12/30/2023 1718 by Rhonda Apple RN  Outcome: Ongoing, Progressing  12/30/2023 0745 by Rhonda Apple RN  Outcome: Ongoing, Progressing  Intervention: Support Psychosocial Response to Complications During Pregnancy  Recent Flowsheet Documentation  Taken 12/30/2023 0856 by Rhonda Apple RN  Family/Support System Care:   self-care encouraged   support provided   presence promoted     Problem: Hypertensive Disorders in Pregnancy  Goal: Maternal-Fetal Stabilization  12/30/2023 1718 by Rhonda Apple RN  Outcome: Ongoing, Progressing  12/30/2023 0745 by Rhonda Apple RN  Outcome: Ongoing, Progressing   Goal Outcome Evaluation:         POC reviewed and updated throughout shift. BP increasing throughout shift with c/o persistent HA. See MAR. +FM. Denies vision disturbances, RUQ.

## 2023-12-31 ENCOUNTER — ANESTHESIA (OUTPATIENT)
Dept: LABOR AND DELIVERY | Facility: HOSPITAL | Age: 28
End: 2023-12-31
Payer: COMMERCIAL

## 2023-12-31 ENCOUNTER — ANESTHESIA EVENT (OUTPATIENT)
Dept: LABOR AND DELIVERY | Facility: HOSPITAL | Age: 28
End: 2023-12-31
Payer: COMMERCIAL

## 2023-12-31 PROBLEM — O14.23 HELLP SYNDROME IN THIRD TRIMESTER: Status: ACTIVE | Noted: 2023-12-31

## 2023-12-31 LAB
ALBUMIN SERPL-MCNC: 3.3 G/DL (ref 3.5–5.2)
ALBUMIN SERPL-MCNC: 3.7 G/DL (ref 3.5–5.2)
ALBUMIN/GLOB SERPL: 1.3 G/DL
ALBUMIN/GLOB SERPL: 1.5 G/DL
ALP SERPL-CCNC: 152 U/L (ref 39–117)
ALP SERPL-CCNC: 184 U/L (ref 39–117)
ALT SERPL W P-5'-P-CCNC: 293 U/L (ref 1–33)
ALT SERPL W P-5'-P-CCNC: 32 U/L (ref 1–33)
ANION GAP SERPL CALCULATED.3IONS-SCNC: 10 MMOL/L (ref 5–15)
ANION GAP SERPL CALCULATED.3IONS-SCNC: 11.2 MMOL/L (ref 5–15)
AST SERPL-CCNC: 29 U/L (ref 1–32)
AST SERPL-CCNC: 425 U/L (ref 1–32)
BASOPHILS # BLD AUTO: 0.03 10*3/MM3 (ref 0–0.2)
BASOPHILS # BLD AUTO: 0.04 10*3/MM3 (ref 0–0.2)
BASOPHILS NFR BLD AUTO: 0.3 % (ref 0–1.5)
BASOPHILS NFR BLD AUTO: 0.3 % (ref 0–1.5)
BILIRUB SERPL-MCNC: 0.5 MG/DL (ref 0–1.2)
BILIRUB SERPL-MCNC: <0.2 MG/DL (ref 0–1.2)
BUN SERPL-MCNC: 8 MG/DL (ref 6–20)
BUN SERPL-MCNC: 9 MG/DL (ref 6–20)
BUN/CREAT SERPL: 11 (ref 7–25)
BUN/CREAT SERPL: 12.7 (ref 7–25)
CALCIUM SPEC-SCNC: 7.6 MG/DL (ref 8.6–10.5)
CALCIUM SPEC-SCNC: 8.7 MG/DL (ref 8.6–10.5)
CHLORIDE SERPL-SCNC: 107 MMOL/L (ref 98–107)
CHLORIDE SERPL-SCNC: 108 MMOL/L (ref 98–107)
CO2 SERPL-SCNC: 19.8 MMOL/L (ref 22–29)
CO2 SERPL-SCNC: 20 MMOL/L (ref 22–29)
CREAT SERPL-MCNC: 0.71 MG/DL (ref 0.57–1)
CREAT SERPL-MCNC: 0.73 MG/DL (ref 0.57–1)
DEPRECATED RDW RBC AUTO: 45.1 FL (ref 37–54)
DEPRECATED RDW RBC AUTO: 46 FL (ref 37–54)
EGFRCR SERPLBLD CKD-EPI 2021: 116.5 ML/MIN/1.73
EGFRCR SERPLBLD CKD-EPI 2021: 120.4 ML/MIN/1.73
EOSINOPHIL # BLD AUTO: 0.02 10*3/MM3 (ref 0–0.4)
EOSINOPHIL # BLD AUTO: 0.05 10*3/MM3 (ref 0–0.4)
EOSINOPHIL NFR BLD AUTO: 0.1 % (ref 0.3–6.2)
EOSINOPHIL NFR BLD AUTO: 0.4 % (ref 0.3–6.2)
ERYTHROCYTE [DISTWIDTH] IN BLOOD BY AUTOMATED COUNT: 14.6 % (ref 12.3–15.4)
ERYTHROCYTE [DISTWIDTH] IN BLOOD BY AUTOMATED COUNT: 15 % (ref 12.3–15.4)
GLOBULIN UR ELPH-MCNC: 2.4 GM/DL
GLOBULIN UR ELPH-MCNC: 2.5 GM/DL
GLUCOSE SERPL-MCNC: 107 MG/DL (ref 65–99)
GLUCOSE SERPL-MCNC: 92 MG/DL (ref 65–99)
HAV IGM SERPL QL IA: NORMAL
HBV CORE IGM SERPL QL IA: NORMAL
HBV SURFACE AG SERPL QL IA: NORMAL
HCT VFR BLD AUTO: 29.8 % (ref 34–46.6)
HCT VFR BLD AUTO: 34.7 % (ref 34–46.6)
HCV AB SER DONR QL: NORMAL
HGB BLD-MCNC: 10 G/DL (ref 12–15.9)
HGB BLD-MCNC: 11 G/DL (ref 12–15.9)
IMM GRANULOCYTES # BLD AUTO: 0.51 10*3/MM3 (ref 0–0.05)
IMM GRANULOCYTES # BLD AUTO: 0.7 10*3/MM3 (ref 0–0.05)
IMM GRANULOCYTES NFR BLD AUTO: 4.4 % (ref 0–0.5)
IMM GRANULOCYTES NFR BLD AUTO: 4.7 % (ref 0–0.5)
LDH SERPL-CCNC: 260 U/L (ref 135–214)
LYMPHOCYTES # BLD AUTO: 1.29 10*3/MM3 (ref 0.7–3.1)
LYMPHOCYTES # BLD AUTO: 1.72 10*3/MM3 (ref 0.7–3.1)
LYMPHOCYTES NFR BLD AUTO: 14.9 % (ref 19.6–45.3)
LYMPHOCYTES NFR BLD AUTO: 8.7 % (ref 19.6–45.3)
MCH RBC QN AUTO: 27 PG (ref 26.6–33)
MCH RBC QN AUTO: 28.6 PG (ref 26.6–33)
MCHC RBC AUTO-ENTMCNC: 31.7 G/DL (ref 31.5–35.7)
MCHC RBC AUTO-ENTMCNC: 33.6 G/DL (ref 31.5–35.7)
MCV RBC AUTO: 85 FL (ref 79–97)
MCV RBC AUTO: 85.1 FL (ref 79–97)
MONOCYTES # BLD AUTO: 0.94 10*3/MM3 (ref 0.1–0.9)
MONOCYTES # BLD AUTO: 1.02 10*3/MM3 (ref 0.1–0.9)
MONOCYTES NFR BLD AUTO: 6.4 % (ref 5–12)
MONOCYTES NFR BLD AUTO: 8.8 % (ref 5–12)
NEUTROPHILS NFR BLD AUTO: 11.79 10*3/MM3 (ref 1.7–7)
NEUTROPHILS NFR BLD AUTO: 71.2 % (ref 42.7–76)
NEUTROPHILS NFR BLD AUTO: 79.8 % (ref 42.7–76)
NEUTROPHILS NFR BLD AUTO: 8.24 10*3/MM3 (ref 1.7–7)
NRBC BLD AUTO-RTO: 0.3 /100 WBC (ref 0–0.2)
NRBC BLD AUTO-RTO: 0.8 /100 WBC (ref 0–0.2)
PLATELET # BLD AUTO: 115 10*3/MM3 (ref 140–450)
PLATELET # BLD AUTO: 144 10*3/MM3 (ref 140–450)
PMV BLD AUTO: 10.5 FL (ref 6–12)
PMV BLD AUTO: 11.2 FL (ref 6–12)
POTASSIUM SERPL-SCNC: 3.7 MMOL/L (ref 3.5–5.2)
POTASSIUM SERPL-SCNC: 3.8 MMOL/L (ref 3.5–5.2)
PROT SERPL-MCNC: 5.8 G/DL (ref 6–8.5)
PROT SERPL-MCNC: 6.1 G/DL (ref 6–8.5)
RBC # BLD AUTO: 3.5 10*6/MM3 (ref 3.77–5.28)
RBC # BLD AUTO: 4.08 10*6/MM3 (ref 3.77–5.28)
SODIUM SERPL-SCNC: 137 MMOL/L (ref 136–145)
SODIUM SERPL-SCNC: 139 MMOL/L (ref 136–145)
URATE SERPL-MCNC: 4.4 MG/DL (ref 2.4–5.7)
WBC NRBC COR # BLD AUTO: 11.57 10*3/MM3 (ref 3.4–10.8)
WBC NRBC COR # BLD AUTO: 14.78 10*3/MM3 (ref 3.4–10.8)

## 2023-12-31 PROCEDURE — 59200 INSERT CERVICAL DILATOR: CPT | Performed by: OBSTETRICS & GYNECOLOGY

## 2023-12-31 PROCEDURE — C1755 CATHETER, INTRASPINAL: HCPCS | Performed by: ANESTHESIOLOGY

## 2023-12-31 PROCEDURE — 80053 COMPREHEN METABOLIC PANEL: CPT | Performed by: OBSTETRICS & GYNECOLOGY

## 2023-12-31 PROCEDURE — 83615 LACTATE (LD) (LDH) ENZYME: CPT | Performed by: OBSTETRICS & GYNECOLOGY

## 2023-12-31 PROCEDURE — 25810000003 LACTATED RINGERS PER 1000 ML: Performed by: OBSTETRICS & GYNECOLOGY

## 2023-12-31 PROCEDURE — 84550 ASSAY OF BLOOD/URIC ACID: CPT | Performed by: OBSTETRICS & GYNECOLOGY

## 2023-12-31 PROCEDURE — 99232 SBSQ HOSP IP/OBS MODERATE 35: CPT | Performed by: OBSTETRICS & GYNECOLOGY

## 2023-12-31 PROCEDURE — 59025 FETAL NON-STRESS TEST: CPT

## 2023-12-31 PROCEDURE — 25010000002 PENICILLIN G POTASSIUM PER 600000 UNITS: Performed by: OBSTETRICS & GYNECOLOGY

## 2023-12-31 PROCEDURE — 63710000001 DIPHENHYDRAMINE PER 50 MG: Performed by: OBSTETRICS & GYNECOLOGY

## 2023-12-31 PROCEDURE — 85025 COMPLETE CBC W/AUTO DIFF WBC: CPT | Performed by: OBSTETRICS & GYNECOLOGY

## 2023-12-31 PROCEDURE — 25010000002 MAGNESIUM SULFATE 20 GM/500ML SOLUTION: Performed by: OBSTETRICS & GYNECOLOGY

## 2023-12-31 PROCEDURE — 80074 ACUTE HEPATITIS PANEL: CPT | Performed by: OBSTETRICS & GYNECOLOGY

## 2023-12-31 PROCEDURE — 87081 CULTURE SCREEN ONLY: CPT | Performed by: OBSTETRICS & GYNECOLOGY

## 2023-12-31 RX ORDER — DIPHENHYDRAMINE HCL 25 MG
25 CAPSULE ORAL ONCE
Status: COMPLETED | OUTPATIENT
Start: 2023-12-31 | End: 2023-12-31

## 2023-12-31 RX ORDER — MAGNESIUM SULFATE HEPTAHYDRATE 40 MG/ML
2 INJECTION, SOLUTION INTRAVENOUS CONTINUOUS
Status: DISPENSED | OUTPATIENT
Start: 2023-12-31 | End: 2024-01-03

## 2023-12-31 RX ORDER — HYDROXYZINE PAMOATE 50 MG/1
50 CAPSULE ORAL 3 TIMES DAILY PRN
Status: DISCONTINUED | OUTPATIENT
Start: 2023-12-31 | End: 2024-01-04 | Stop reason: HOSPADM

## 2023-12-31 RX ORDER — DIPHENHYDRAMINE HYDROCHLORIDE 50 MG/ML
12.5 INJECTION INTRAMUSCULAR; INTRAVENOUS EVERY 8 HOURS PRN
Status: DISCONTINUED | OUTPATIENT
Start: 2023-12-31 | End: 2024-01-01

## 2023-12-31 RX ORDER — METOCLOPRAMIDE 10 MG/1
10 TABLET ORAL ONCE
Status: COMPLETED | OUTPATIENT
Start: 2023-12-31 | End: 2023-12-31

## 2023-12-31 RX ORDER — FAMOTIDINE 10 MG/ML
20 INJECTION, SOLUTION INTRAVENOUS ONCE AS NEEDED
Status: DISCONTINUED | OUTPATIENT
Start: 2023-12-31 | End: 2024-01-01

## 2023-12-31 RX ORDER — SODIUM CHLORIDE, SODIUM LACTATE, POTASSIUM CHLORIDE, CALCIUM CHLORIDE 600; 310; 30; 20 MG/100ML; MG/100ML; MG/100ML; MG/100ML
75 INJECTION, SOLUTION INTRAVENOUS CONTINUOUS
Status: DISCONTINUED | OUTPATIENT
Start: 2023-12-31 | End: 2024-01-04 | Stop reason: HOSPADM

## 2023-12-31 RX ORDER — FENTANYL CIT 0.2 MG/100ML-ROPIV 0.2%-NACL 0.9% EPIDURAL INJ 2/0.2 MCG/ML-%
10 SOLUTION INJECTION CONTINUOUS
Status: DISPENSED | OUTPATIENT
Start: 2023-12-31 | End: 2024-01-03

## 2023-12-31 RX ORDER — OXYTOCIN/0.9 % SODIUM CHLORIDE 30/500 ML
2-20 PLASTIC BAG, INJECTION (ML) INTRAVENOUS
Status: DISCONTINUED | OUTPATIENT
Start: 2023-12-31 | End: 2024-01-01

## 2023-12-31 RX ORDER — EPHEDRINE SULFATE 50 MG/ML
5 INJECTION, SOLUTION INTRAVENOUS
Status: DISCONTINUED | OUTPATIENT
Start: 2023-12-31 | End: 2024-01-01

## 2023-12-31 RX ORDER — ONDANSETRON 2 MG/ML
4 INJECTION INTRAMUSCULAR; INTRAVENOUS ONCE AS NEEDED
Status: DISCONTINUED | OUTPATIENT
Start: 2023-12-31 | End: 2024-01-01

## 2023-12-31 RX ORDER — PENICILLIN G 3000000 [IU]/50ML
3 INJECTION, SOLUTION INTRAVENOUS EVERY 4 HOURS
Status: DISCONTINUED | OUTPATIENT
Start: 2023-12-31 | End: 2024-01-01

## 2023-12-31 RX ADMIN — Medication 10 ML/HR: at 14:17

## 2023-12-31 RX ADMIN — Medication 10 ML/HR: at 21:19

## 2023-12-31 RX ADMIN — Medication 10 ML/HR: at 14:32

## 2023-12-31 RX ADMIN — SODIUM CHLORIDE, POTASSIUM CHLORIDE, SODIUM LACTATE AND CALCIUM CHLORIDE 75 ML/HR: 600; 310; 30; 20 INJECTION, SOLUTION INTRAVENOUS at 12:24

## 2023-12-31 RX ADMIN — MAGNESIUM SULFATE HEPTAHYDRATE 2 G/HR: 40 INJECTION, SOLUTION INTRAVENOUS at 20:43

## 2023-12-31 RX ADMIN — BUTALBITAL, ACETAMINOPHEN AND CAFFEINE 1 TABLET: 325; 50; 40 TABLET ORAL at 08:37

## 2023-12-31 RX ADMIN — Medication 1 TABLET: at 08:37

## 2023-12-31 RX ADMIN — SODIUM CHLORIDE 5 MILLION UNITS: 9 INJECTION, SOLUTION INTRAVENOUS at 17:28

## 2023-12-31 RX ADMIN — Medication 2 MILLI-UNITS/MIN: at 12:36

## 2023-12-31 RX ADMIN — DIPHENHYDRAMINE HYDROCHLORIDE 25 MG: 25 CAPSULE ORAL at 11:14

## 2023-12-31 RX ADMIN — METOCLOPRAMIDE 10 MG: 10 TABLET ORAL at 11:14

## 2023-12-31 RX ADMIN — PENICILLIN G 3 MILLION UNITS: 3000000 INJECTION, SOLUTION INTRAVENOUS at 20:44

## 2023-12-31 RX ADMIN — BUTALBITAL, ACETAMINOPHEN AND CAFFEINE 1 TABLET: 325; 50; 40 TABLET ORAL at 14:33

## 2023-12-31 RX ADMIN — SODIUM CHLORIDE, POTASSIUM CHLORIDE, SODIUM LACTATE AND CALCIUM CHLORIDE 75 ML/HR: 600; 310; 30; 20 INJECTION, SOLUTION INTRAVENOUS at 17:33

## 2023-12-31 NOTE — NON STRESS TEST
Sylvester Clarke, a  at 34w2d with an SUZY of 2024, by Last Menstrual Period, was seen at [facility] ANTEPARTUM UNIT for a nonstress test.    Chief Complaint   Patient presents with    Elevated Blood Pressure     Patient presents to labor and delivery triage from the MD office for a blood pressure check and steriods       Patient Active Problem List   Diagnosis    Pre-eclampsia in third trimester       Start Time:   Stop Time:     Interpretation A  Nonstress Test Interpretation A: Reactive

## 2023-12-31 NOTE — ANESTHESIA PROCEDURE NOTES
Labor Epidural    Pre-sedation assessment completed: 12/31/2023 2:07 PM    Patient reassessed immediately prior to procedure    Patient location during procedure: OB  Start Time: 12/31/2023 2:07 PM  Stop Time: 12/31/2023 2:12 PM  Indication:at surgeon's request  Performed By  Anesthesiologist: Marlene Scherer MD  Preanesthetic Checklist  Completed: patient identified, IV checked, site marked, risks and benefits discussed, surgical consent, monitors and equipment checked, pre-op evaluation and timeout performed  Additional Notes  34w3d    Prep:  Pt Position:sitting  Sterile Tech:cap, gloves, mask and sterile barrier  Prep:chlorhexidine gluconate and isopropyl alcohol  Monitoring:blood pressure monitoring, continuous pulse oximetry and EKG  Epidural Block Procedure:  Approach:midline  Guidance:landmark technique  Location:L4-L5  Needle Type:Tuohy  Needle Gauge:17  Loss of Resistance Medium: saline  Loss of Resistance: 6cm  Cath Depth at skin:10 cm  Paresthesia: none  Aspiration:negative  Test Dose:negative  Number of Attempts: 1  Post Assessment:  Dressing:occlusive dressing applied and secured with tape  Pt Tolerance:patient tolerated the procedure well with no apparent complications  Complications:no

## 2023-12-31 NOTE — ANESTHESIA PREPROCEDURE EVALUATION
" Anesthesia Evaluation     Patient summary reviewed and Nursing notes reviewed   no history of anesthetic complications:                Airway   Mallampati: II  Dental      Pulmonary - negative pulmonary ROS   Cardiovascular   Exercise tolerance: good (4-7 METS)    (+) hypertension      Neuro/Psych- negative ROS  GI/Hepatic/Renal/Endo - negative ROS     Musculoskeletal (-) negative ROS    Abdominal    Substance History - negative use     OB/GYN    (+) Pregnant, Preeclampsia, pregnancy induced hypertension        Other                    Anesthesia Plan    ASA 3     epidural     (  34w3d    /89   Pulse 92   Temp 36.9 °C (98.5 °F) (Oral)   Resp 16   Ht 170.2 cm (67\")   Wt 88.9 kg (196 lb)   LMP 2023   SpO2 99%   Breastfeeding Yes   BMI 30.70 kg/m²     I have reviewed the patient's history with the patient and the chart, including all pertinent laboratory results and imaging. I have explained the risks of anesthesia including but not limited to dental damage, corneal abrasion, nerve injury, MI, stroke, and death.    )    Anesthetic plan, risks, benefits, and alternatives have been provided, discussed and informed consent has been obtained with: patient.    CODE STATUS:    Code Status (Patient has no pulse and is not breathing): CPR (Attempt to Resuscitate)  Medical Interventions (Patient has pulse or is breathing): Full      "

## 2023-12-31 NOTE — PLAN OF CARE
Goal Outcome Evaluation:  Plan of Care Reviewed With: patient, spouse        Progress: improving  Outcome Evaluation: Pt transfered from antepartum for IOL for increasingly painful HA. Orders receieved for pitocin per protocol, placement of a CRB, MgSO4 for preE features; 6g load, 2g/hr; and a GBS swab for unknown GBS while starting PCN for prophylasis. Pt recieved epidural for pain control and pitocin titrated per protocol. MgSO4 checks performed hourly, UOP adequeate, temp checks Q4H. CRB still in placed.

## 2023-12-31 NOTE — PROGRESS NOTES
Cook catheter easily placed through cervix and uterine balloon insufflated with 60 mL of NS and vaginal balloon insufflated with 60 mL of NS.  Patient tolerated it well.  Will start pitocin and magnesium and patient may have epidural upon request.  Fetal tracing currently Category 1.

## 2023-12-31 NOTE — PLAN OF CARE
Goal Outcome Evaluation:  Plan of Care Reviewed With: patient, significant other        Progress: no change  Outcome Evaluation: VSS and BPs WNL. Patient rates headache 3/10 and declined PO and IV medication for headache pain. Patient denies visual disturbances, vaginal bleeding, LOF/SROM, RUQ pain, nausea, vomiting, crampiness, or contractions. Repeat PIH labs to be drawn this AM. Patient reports positive fetal movement and reactive NST was obtained. Patient showered and took wheelchair off unit to [facility] with SO and appears to be in much better spirits than she was yesterday. She spent a lengthy amount of time with CHARITY Smiley with Neonatology at bedside addressing her questions and concerns regarding the well-being of her baby if she needed to deliver prior to full-term and this appears to have decreased her anxiety a little. Patients IV site was bothering her a bit and it appeared to be taped down in a way that was pushing the IV catheter against the entrance of her skin, causing some discomfort. I did remove the dressing and slightly pulled back the catheter to help alleviate the pain, however patient reported some mild pain when I tried to flush it. Patient did not want IV site rotated at that time, she would prefer to wait to see if she is needing to remain inpatient after todays assessment by the on-call OBGYN. If she is staying, she is okay with rotating the IV site to ensure patency.

## 2023-12-31 NOTE — NON STRESS TEST
Sylvester Clarke, a  at 34w3d with an SUZY of 2024, by Last Menstrual Period, was seen at [facility] ANTEPARTUM UNIT for a nonstress test.    Chief Complaint   Patient presents with    Elevated Blood Pressure     Patient presents to labor and delivery triage from the MD office for a blood pressure check and steriods       Patient Active Problem List   Diagnosis    Pre-eclampsia in third trimester       Start Time: 833  Stop Time: 854    Interpretation A  Nonstress Test Interpretation A: Reactive  Comments A: Appropriate for gestational age.

## 2023-12-31 NOTE — PROGRESS NOTES
[facility]     Progress Note    Patient Name: Sylvester Clarke  : 1995  MRN: [id number]  Primary Care Physician:  Rusty Talley MD  Date of admission: 2023    Subjective   Subjective     Chief Complaint: Headache; preeclampsia    History of Present Illness  Patient reports a worsening headache this morning.  She was given a dose of Fioricet and states her headache continues to worsen.  She does not report having issues with headaches outside of pregnancy, and states she only had issues with headaches in college.  She reports good fetal movement.  She is denying any vision changes or right upper quadrant pain.    Review of Systems   Neurological:  Positive for headaches.   All other systems reviewed and are negative.      Objective   Objective     Vitals:   Temp:  [98 °F (36.7 °C)-98.4 °F (36.9 °C)] 98.2 °F (36.8 °C)  Heart Rate:  [63-68] 66  Resp:  [16] 16  BP: (146-154)/() 148/91    Physical Exam  Vitals reviewed.   Constitutional:       Appearance: Normal appearance.   Cardiovascular:      Rate and Rhythm: Normal rate and regular rhythm.   Pulmonary:      Effort: Pulmonary effort is normal.      Breath sounds: Normal breath sounds.   Abdominal:      Comments: Gravid uterus, nontender   Genitourinary:     Comments: Cervix is 1 to 2 cm, 50% effaced, -2 station  Neurological:      General: No focal deficit present.      Mental Status: She is alert. Mental status is at baseline.   Psychiatric:         Mood and Affect: Mood normal.         Behavior: Behavior normal.          Result Review    Result Review:  I have personally reviewed the results from the time of this admission to 2023 10:46 EST and agree with these findings:  [x]  Laboratory list / accordion  []  Microbiology  [x]  Radiology  []  EKG/Telemetry   []  Cardiology/Vascular   []  Pathology  []  Old records  []  Other:        Assessment & Plan   Assessment / Plan     Brief Patient Summary:  Sylvester Clarke is a 28  y.o. female G1, P0 at 34 weeks and 3 days with preeclampsia, and now exhibiting severe features    Active Hospital Problems:  Active Hospital Problems    Diagnosis     **Pre-eclampsia in third trimester      Plan:     Preeclampsia with severe features--patient's headache is concerning and it has been worsening throughout the morning.  She appears restless at my time of evaluation and I discussed her case with maternal-fetal medicine physician on-call Dr. Francis.  She recommends proceeding with delivery given this worsening headache in the setting of preeclampsia.  I discussed the scenario with the patient and her significant other and recommendations to proceed with delivery.  She is understandably anxious and tearful, but agrees with the plan of care and wishes to proceed with delivery.  She has received betamethasone for fetal lung maturity.  Her cervix is 1 to 2 cm and 50% thinned out and I discussed recommendations for a Cook catheter balloon and Pitocin and she agrees to proceed.  I also discussed recommendations for magnesium sulfate and she agrees.    DVT prophylaxis:  Mechanical DVT prophylaxis orders are present.    CODE STATUS:    Code Status (Patient has no pulse and is not breathing): CPR (Attempt to Resuscitate)  Medical Interventions (Patient has pulse or is breathing): Full    Disposition:  Inpatient.    Mely Mar MD

## 2024-01-01 ENCOUNTER — ANESTHESIA EVENT (OUTPATIENT)
Dept: LABOR AND DELIVERY | Facility: HOSPITAL | Age: 29
End: 2024-01-01
Payer: COMMERCIAL

## 2024-01-01 ENCOUNTER — ANESTHESIA (OUTPATIENT)
Dept: LABOR AND DELIVERY | Facility: HOSPITAL | Age: 29
End: 2024-01-01
Payer: COMMERCIAL

## 2024-01-01 LAB
ALBUMIN SERPL-MCNC: 2.8 G/DL (ref 3.5–5.2)
ALBUMIN SERPL-MCNC: 3.1 G/DL (ref 3.5–5.2)
ALBUMIN SERPL-MCNC: 3.1 G/DL (ref 3.5–5.2)
ALBUMIN/GLOB SERPL: 1.3 G/DL
ALBUMIN/GLOB SERPL: 1.6 G/DL
ALBUMIN/GLOB SERPL: 1.6 G/DL
ALP SERPL-CCNC: 116 U/L (ref 39–117)
ALP SERPL-CCNC: 125 U/L (ref 39–117)
ALP SERPL-CCNC: 160 U/L (ref 39–117)
ALT SERPL W P-5'-P-CCNC: 232 U/L (ref 1–33)
ALT SERPL W P-5'-P-CCNC: 281 U/L (ref 1–33)
ALT SERPL W P-5'-P-CCNC: 431 U/L (ref 1–33)
ANION GAP SERPL CALCULATED.3IONS-SCNC: 10.3 MMOL/L (ref 5–15)
ANION GAP SERPL CALCULATED.3IONS-SCNC: 8 MMOL/L (ref 5–15)
ANION GAP SERPL CALCULATED.3IONS-SCNC: 9 MMOL/L (ref 5–15)
APTT PPP: 24.2 SECONDS (ref 22.7–35.4)
APTT PPP: 26.4 SECONDS (ref 22.7–35.4)
APTT PPP: 27.4 SECONDS (ref 22.7–35.4)
APTT PPP: 27.7 SECONDS (ref 22.7–35.4)
AST SERPL-CCNC: 289 U/L (ref 1–32)
AST SERPL-CCNC: 410 U/L (ref 1–32)
AST SERPL-CCNC: 659 U/L (ref 1–32)
BASOPHILS # BLD AUTO: 0.01 10*3/MM3 (ref 0–0.2)
BASOPHILS # BLD AUTO: 0.02 10*3/MM3 (ref 0–0.2)
BASOPHILS NFR BLD AUTO: 0.1 % (ref 0–1.5)
BASOPHILS NFR BLD AUTO: 0.1 % (ref 0–1.5)
BH BB BLOOD EXPIRATION DATE: NORMAL
BH BB BLOOD TYPE BARCODE: 6200
BH BB DISPENSE STATUS: NORMAL
BH BB PRODUCT CODE: NORMAL
BH BB UNIT NUMBER: NORMAL
BILIRUB SERPL-MCNC: 0.6 MG/DL (ref 0–1.2)
BILIRUB SERPL-MCNC: 0.7 MG/DL (ref 0–1.2)
BILIRUB SERPL-MCNC: 0.7 MG/DL (ref 0–1.2)
BUN SERPL-MCNC: 10 MG/DL (ref 6–20)
BUN SERPL-MCNC: 11 MG/DL (ref 6–20)
BUN SERPL-MCNC: 9 MG/DL (ref 6–20)
BUN/CREAT SERPL: 13 (ref 7–25)
BUN/CREAT SERPL: 13.6 (ref 7–25)
BUN/CREAT SERPL: 14.3 (ref 7–25)
CALCIUM SPEC-SCNC: 6.5 MG/DL (ref 8.6–10.5)
CALCIUM SPEC-SCNC: 6.8 MG/DL (ref 8.6–10.5)
CALCIUM SPEC-SCNC: 6.9 MG/DL (ref 8.6–10.5)
CHLORIDE SERPL-SCNC: 105 MMOL/L (ref 98–107)
CHLORIDE SERPL-SCNC: 105 MMOL/L (ref 98–107)
CHLORIDE SERPL-SCNC: 106 MMOL/L (ref 98–107)
CO2 SERPL-SCNC: 18.7 MMOL/L (ref 22–29)
CO2 SERPL-SCNC: 20 MMOL/L (ref 22–29)
CO2 SERPL-SCNC: 25 MMOL/L (ref 22–29)
CREAT SERPL-MCNC: 0.66 MG/DL (ref 0.57–1)
CREAT SERPL-MCNC: 0.77 MG/DL (ref 0.57–1)
CREAT SERPL-MCNC: 0.77 MG/DL (ref 0.57–1)
DEPRECATED RDW RBC AUTO: 44.7 FL (ref 37–54)
DEPRECATED RDW RBC AUTO: 46.3 FL (ref 37–54)
DEPRECATED RDW RBC AUTO: 47.6 FL (ref 37–54)
DEPRECATED RDW RBC AUTO: 47.6 FL (ref 37–54)
EGFRCR SERPLBLD CKD-EPI 2021: 109.3 ML/MIN/1.73
EGFRCR SERPLBLD CKD-EPI 2021: 109.3 ML/MIN/1.73
EGFRCR SERPLBLD CKD-EPI 2021: 124.2 ML/MIN/1.73
EOSINOPHIL # BLD AUTO: 0.01 10*3/MM3 (ref 0–0.4)
EOSINOPHIL # BLD AUTO: 0.02 10*3/MM3 (ref 0–0.4)
EOSINOPHIL NFR BLD AUTO: 0.1 % (ref 0.3–6.2)
EOSINOPHIL NFR BLD AUTO: 0.1 % (ref 0.3–6.2)
ERYTHROCYTE [DISTWIDTH] IN BLOOD BY AUTOMATED COUNT: 15 % (ref 12.3–15.4)
ERYTHROCYTE [DISTWIDTH] IN BLOOD BY AUTOMATED COUNT: 15 % (ref 12.3–15.4)
ERYTHROCYTE [DISTWIDTH] IN BLOOD BY AUTOMATED COUNT: 15.1 % (ref 12.3–15.4)
ERYTHROCYTE [DISTWIDTH] IN BLOOD BY AUTOMATED COUNT: 15.6 % (ref 12.3–15.4)
FIBRINOGEN PPP-MCNC: 216 MG/DL (ref 219–464)
FIBRINOGEN PPP-MCNC: 244 MG/DL (ref 219–464)
FIBRINOGEN PPP-MCNC: 264 MG/DL (ref 219–464)
FIBRINOGEN PPP-MCNC: 320 MG/DL (ref 219–464)
GLOBULIN UR ELPH-MCNC: 1.7 GM/DL
GLOBULIN UR ELPH-MCNC: 2 GM/DL
GLOBULIN UR ELPH-MCNC: 2.3 GM/DL
GLUCOSE SERPL-MCNC: 84 MG/DL (ref 65–99)
GLUCOSE SERPL-MCNC: 90 MG/DL (ref 65–99)
GLUCOSE SERPL-MCNC: 97 MG/DL (ref 65–99)
HCT VFR BLD AUTO: 19.4 % (ref 34–46.6)
HCT VFR BLD AUTO: 22.7 % (ref 34–46.6)
HCT VFR BLD AUTO: 25.1 % (ref 34–46.6)
HCT VFR BLD AUTO: 29.3 % (ref 34–46.6)
HGB BLD-MCNC: 6.6 G/DL (ref 12–15.9)
HGB BLD-MCNC: 7.5 G/DL (ref 12–15.9)
HGB BLD-MCNC: 7.9 G/DL (ref 12–15.9)
HGB BLD-MCNC: 9.4 G/DL (ref 12–15.9)
IMM GRANULOCYTES # BLD AUTO: 0.24 10*3/MM3 (ref 0–0.05)
IMM GRANULOCYTES # BLD AUTO: 0.26 10*3/MM3 (ref 0–0.05)
IMM GRANULOCYTES NFR BLD AUTO: 1.8 % (ref 0–0.5)
IMM GRANULOCYTES NFR BLD AUTO: 2.2 % (ref 0–0.5)
INR PPP: 1.07 (ref 0.9–1.1)
INR PPP: 1.1 (ref 0.9–1.1)
INR PPP: 1.16 (ref 0.9–1.1)
INR PPP: 1.21 (ref 0.9–1.1)
LYMPHOCYTES # BLD AUTO: 1.17 10*3/MM3 (ref 0.7–3.1)
LYMPHOCYTES # BLD AUTO: 1.24 10*3/MM3 (ref 0.7–3.1)
LYMPHOCYTES NFR BLD AUTO: 10.6 % (ref 19.6–45.3)
LYMPHOCYTES NFR BLD AUTO: 8.8 % (ref 19.6–45.3)
MAGNESIUM SERPL-MCNC: 7 MG/DL (ref 1.6–2.6)
MCH RBC QN AUTO: 26.8 PG (ref 26.6–33)
MCH RBC QN AUTO: 26.8 PG (ref 26.6–33)
MCH RBC QN AUTO: 28.6 PG (ref 26.6–33)
MCH RBC QN AUTO: 29.1 PG (ref 26.6–33)
MCHC RBC AUTO-ENTMCNC: 31.5 G/DL (ref 31.5–35.7)
MCHC RBC AUTO-ENTMCNC: 32.1 G/DL (ref 31.5–35.7)
MCHC RBC AUTO-ENTMCNC: 33 G/DL (ref 31.5–35.7)
MCHC RBC AUTO-ENTMCNC: 34 G/DL (ref 31.5–35.7)
MCV RBC AUTO: 83.5 FL (ref 79–97)
MCV RBC AUTO: 85.1 FL (ref 79–97)
MCV RBC AUTO: 85.5 FL (ref 79–97)
MCV RBC AUTO: 86.6 FL (ref 79–97)
MONOCYTES # BLD AUTO: 0.85 10*3/MM3 (ref 0.1–0.9)
MONOCYTES # BLD AUTO: 1.11 10*3/MM3 (ref 0.1–0.9)
MONOCYTES NFR BLD AUTO: 7.7 % (ref 5–12)
MONOCYTES NFR BLD AUTO: 7.9 % (ref 5–12)
NEUTROPHILS NFR BLD AUTO: 11.41 10*3/MM3 (ref 1.7–7)
NEUTROPHILS NFR BLD AUTO: 79.3 % (ref 42.7–76)
NEUTROPHILS NFR BLD AUTO: 8.74 10*3/MM3 (ref 1.7–7)
NEUTROPHILS NFR BLD AUTO: 81.3 % (ref 42.7–76)
NRBC BLD AUTO-RTO: 0.3 /100 WBC (ref 0–0.2)
NRBC BLD AUTO-RTO: 1 /100 WBC (ref 0–0.2)
PLATELET # BLD AUTO: 56 10*3/MM3 (ref 140–450)
PLATELET # BLD AUTO: 59 10*3/MM3 (ref 140–450)
PLATELET # BLD AUTO: 62 10*3/MM3 (ref 140–450)
PLATELET # BLD AUTO: 70 10*3/MM3 (ref 140–450)
PMV BLD AUTO: 10.1 FL (ref 6–12)
PMV BLD AUTO: 10.6 FL (ref 6–12)
PMV BLD AUTO: 9 FL (ref 6–12)
PMV BLD AUTO: 9.7 FL (ref 6–12)
POTASSIUM SERPL-SCNC: 3.7 MMOL/L (ref 3.5–5.2)
POTASSIUM SERPL-SCNC: 4.6 MMOL/L (ref 3.5–5.2)
POTASSIUM SERPL-SCNC: 4.7 MMOL/L (ref 3.5–5.2)
PROT SERPL-MCNC: 4.5 G/DL (ref 6–8.5)
PROT SERPL-MCNC: 5.1 G/DL (ref 6–8.5)
PROT SERPL-MCNC: 5.4 G/DL (ref 6–8.5)
PROTHROMBIN TIME: 14.1 SECONDS (ref 11.7–14.2)
PROTHROMBIN TIME: 14.3 SECONDS (ref 11.7–14.2)
PROTHROMBIN TIME: 15 SECONDS (ref 11.7–14.2)
PROTHROMBIN TIME: 15.4 SECONDS (ref 11.7–14.2)
RBC # BLD AUTO: 2.27 10*6/MM3 (ref 3.77–5.28)
RBC # BLD AUTO: 2.62 10*6/MM3 (ref 3.77–5.28)
RBC # BLD AUTO: 2.95 10*6/MM3 (ref 3.77–5.28)
RBC # BLD AUTO: 3.51 10*6/MM3 (ref 3.77–5.28)
SODIUM SERPL-SCNC: 134 MMOL/L (ref 136–145)
SODIUM SERPL-SCNC: 134 MMOL/L (ref 136–145)
SODIUM SERPL-SCNC: 139 MMOL/L (ref 136–145)
UNIT  ABO: NORMAL
UNIT  RH: NORMAL
WBC NRBC COR # BLD AUTO: 11.02 10*3/MM3 (ref 3.4–10.8)
WBC NRBC COR # BLD AUTO: 12.44 10*3/MM3 (ref 3.4–10.8)
WBC NRBC COR # BLD AUTO: 14.06 10*3/MM3 (ref 3.4–10.8)
WBC NRBC COR # BLD AUTO: 9.84 10*3/MM3 (ref 3.4–10.8)

## 2024-01-01 PROCEDURE — 85384 FIBRINOGEN ACTIVITY: CPT | Performed by: OBSTETRICS & GYNECOLOGY

## 2024-01-01 PROCEDURE — 80053 COMPREHEN METABOLIC PANEL: CPT | Performed by: OBSTETRICS & GYNECOLOGY

## 2024-01-01 PROCEDURE — 85027 COMPLETE CBC AUTOMATED: CPT | Performed by: OBSTETRICS & GYNECOLOGY

## 2024-01-01 PROCEDURE — 85730 THROMBOPLASTIN TIME PARTIAL: CPT | Performed by: OBSTETRICS & GYNECOLOGY

## 2024-01-01 PROCEDURE — 0W3R7ZZ CONTROL BLEEDING IN GENITOURINARY TRACT, VIA NATURAL OR ARTIFICIAL OPENING: ICD-10-PCS | Performed by: OBSTETRICS & GYNECOLOGY

## 2024-01-01 PROCEDURE — 85025 COMPLETE CBC W/AUTO DIFF WBC: CPT | Performed by: OBSTETRICS & GYNECOLOGY

## 2024-01-01 PROCEDURE — P9059 PLASMA, FRZ BETWEEN 8-24HOUR: HCPCS

## 2024-01-01 PROCEDURE — P9016 RBC LEUKOCYTES REDUCED: HCPCS

## 2024-01-01 PROCEDURE — 59400 OBSTETRICAL CARE: CPT | Performed by: OBSTETRICS & GYNECOLOGY

## 2024-01-01 PROCEDURE — 86927 PLASMA FRESH FROZEN: CPT

## 2024-01-01 PROCEDURE — 86901 BLOOD TYPING SEROLOGIC RH(D): CPT | Performed by: OBSTETRICS & GYNECOLOGY

## 2024-01-01 PROCEDURE — 85610 PROTHROMBIN TIME: CPT | Performed by: OBSTETRICS & GYNECOLOGY

## 2024-01-01 PROCEDURE — 63710000001 DIPHENHYDRAMINE PER 50 MG: Performed by: OBSTETRICS & GYNECOLOGY

## 2024-01-01 PROCEDURE — 25010000002 LABETALOL 5 MG/ML SOLUTION: Performed by: OBSTETRICS & GYNECOLOGY

## 2024-01-01 PROCEDURE — 83735 ASSAY OF MAGNESIUM: CPT | Performed by: OBSTETRICS & GYNECOLOGY

## 2024-01-01 PROCEDURE — 36430 TRANSFUSION BLD/BLD COMPNT: CPT

## 2024-01-01 PROCEDURE — P9035 PLATELET PHERES LEUKOREDUCED: HCPCS

## 2024-01-01 PROCEDURE — 86900 BLOOD TYPING SEROLOGIC ABO: CPT

## 2024-01-01 PROCEDURE — 86850 RBC ANTIBODY SCREEN: CPT | Performed by: OBSTETRICS & GYNECOLOGY

## 2024-01-01 PROCEDURE — P9100 PATHOGEN TEST FOR PLATELETS: HCPCS

## 2024-01-01 PROCEDURE — 25810000003 LACTATED RINGERS PER 1000 ML: Performed by: OBSTETRICS & GYNECOLOGY

## 2024-01-01 PROCEDURE — 25010000002 MAGNESIUM SULFATE 20 GM/500ML SOLUTION: Performed by: OBSTETRICS & GYNECOLOGY

## 2024-01-01 PROCEDURE — 86900 BLOOD TYPING SEROLOGIC ABO: CPT | Performed by: OBSTETRICS & GYNECOLOGY

## 2024-01-01 PROCEDURE — 88307 TISSUE EXAM BY PATHOLOGIST: CPT

## 2024-01-01 PROCEDURE — 0503F POSTPARTUM CARE VISIT: CPT | Performed by: OBSTETRICS & GYNECOLOGY

## 2024-01-01 RX ORDER — DIPHENHYDRAMINE HCL 25 MG
25 CAPSULE ORAL ONCE
Status: COMPLETED | OUTPATIENT
Start: 2024-01-01 | End: 2024-01-01

## 2024-01-01 RX ORDER — NIFEDIPINE 10 MG/1
10-20 CAPSULE ORAL
Status: DISCONTINUED | OUTPATIENT
Start: 2024-01-01 | End: 2024-01-03

## 2024-01-01 RX ORDER — SODIUM CHLORIDE 0.9 % (FLUSH) 0.9 %
1-10 SYRINGE (ML) INJECTION AS NEEDED
Status: DISCONTINUED | OUTPATIENT
Start: 2024-01-01 | End: 2024-01-04 | Stop reason: HOSPADM

## 2024-01-01 RX ORDER — HYDRALAZINE HYDROCHLORIDE 20 MG/ML
5-10 INJECTION INTRAMUSCULAR; INTRAVENOUS
Status: DISCONTINUED | OUTPATIENT
Start: 2024-01-01 | End: 2024-01-04 | Stop reason: HOSPADM

## 2024-01-01 RX ORDER — BISACODYL 10 MG
10 SUPPOSITORY, RECTAL RECTAL DAILY PRN
Status: DISCONTINUED | OUTPATIENT
Start: 2024-01-02 | End: 2024-01-04 | Stop reason: HOSPADM

## 2024-01-01 RX ORDER — DOCUSATE SODIUM 100 MG/1
100 CAPSULE, LIQUID FILLED ORAL 2 TIMES DAILY
Status: DISCONTINUED | OUTPATIENT
Start: 2024-01-01 | End: 2024-01-04 | Stop reason: HOSPADM

## 2024-01-01 RX ORDER — HYDROCORTISONE 25 MG/G
CREAM TOPICAL AS NEEDED
Status: DISCONTINUED | OUTPATIENT
Start: 2024-01-01 | End: 2024-01-04 | Stop reason: HOSPADM

## 2024-01-01 RX ORDER — PROMETHAZINE HYDROCHLORIDE 25 MG/1
25 TABLET ORAL EVERY 6 HOURS PRN
Status: DISCONTINUED | OUTPATIENT
Start: 2024-01-01 | End: 2024-01-04 | Stop reason: HOSPADM

## 2024-01-01 RX ORDER — TRAMADOL HYDROCHLORIDE 50 MG/1
50 TABLET ORAL EVERY 6 HOURS PRN
Status: DISCONTINUED | OUTPATIENT
Start: 2024-01-01 | End: 2024-01-04 | Stop reason: HOSPADM

## 2024-01-01 RX ORDER — PRENATAL VIT/IRON FUM/FOLIC AC 27MG-0.8MG
1 TABLET ORAL DAILY
Status: DISCONTINUED | OUTPATIENT
Start: 2024-01-01 | End: 2024-01-04 | Stop reason: HOSPADM

## 2024-01-01 RX ORDER — CARBOPROST TROMETHAMINE 250 UG/ML
INJECTION, SOLUTION INTRAMUSCULAR
Status: COMPLETED
Start: 2024-01-01 | End: 2024-01-01

## 2024-01-01 RX ORDER — OXYTOCIN/0.9 % SODIUM CHLORIDE 30/500 ML
125 PLASTIC BAG, INJECTION (ML) INTRAVENOUS CONTINUOUS PRN
Status: COMPLETED | OUTPATIENT
Start: 2024-01-01 | End: 2024-01-01

## 2024-01-01 RX ORDER — IBUPROFEN 600 MG/1
600 TABLET ORAL EVERY 6 HOURS PRN
Status: DISCONTINUED | OUTPATIENT
Start: 2024-01-01 | End: 2024-01-04 | Stop reason: HOSPADM

## 2024-01-01 RX ORDER — DIPHENOXYLATE HYDROCHLORIDE AND ATROPINE SULFATE 2.5; .025 MG/1; MG/1
1 TABLET ORAL
Status: DISCONTINUED | OUTPATIENT
Start: 2024-01-01 | End: 2024-01-04 | Stop reason: HOSPADM

## 2024-01-01 RX ORDER — ONDANSETRON 4 MG/1
4 TABLET, ORALLY DISINTEGRATING ORAL EVERY 6 HOURS PRN
Status: DISCONTINUED | OUTPATIENT
Start: 2024-01-01 | End: 2024-01-04 | Stop reason: HOSPADM

## 2024-01-01 RX ORDER — MISOPROSTOL 200 UG/1
TABLET ORAL
Status: COMPLETED
Start: 2024-01-01 | End: 2024-01-01

## 2024-01-01 RX ORDER — TRANEXAMIC ACID 10 MG/ML
INJECTION, SOLUTION INTRAVENOUS
Status: COMPLETED
Start: 2024-01-01 | End: 2024-01-01

## 2024-01-01 RX ORDER — PROMETHAZINE HYDROCHLORIDE 12.5 MG/1
12.5 SUPPOSITORY RECTAL EVERY 6 HOURS PRN
Status: DISCONTINUED | OUTPATIENT
Start: 2024-01-01 | End: 2024-01-04 | Stop reason: HOSPADM

## 2024-01-01 RX ORDER — ONDANSETRON 2 MG/ML
4 INJECTION INTRAMUSCULAR; INTRAVENOUS EVERY 6 HOURS PRN
Status: DISCONTINUED | OUTPATIENT
Start: 2024-01-01 | End: 2024-01-04 | Stop reason: HOSPADM

## 2024-01-01 RX ORDER — LABETALOL HYDROCHLORIDE 5 MG/ML
20-80 INJECTION, SOLUTION INTRAVENOUS
Status: DISCONTINUED | OUTPATIENT
Start: 2024-01-01 | End: 2024-01-04 | Stop reason: HOSPADM

## 2024-01-01 RX ORDER — TRANEXAMIC ACID 10 MG/ML
1000 INJECTION, SOLUTION INTRAVENOUS ONCE
Status: COMPLETED | OUTPATIENT
Start: 2024-01-01 | End: 2024-01-01

## 2024-01-01 RX ADMIN — DIPHENHYDRAMINE HYDROCHLORIDE 25 MG: 25 CAPSULE ORAL at 06:54

## 2024-01-01 RX ADMIN — LABETALOL HYDROCHLORIDE 20 MG: 5 INJECTION, SOLUTION INTRAVENOUS at 00:49

## 2024-01-01 RX ADMIN — TRANEXAMIC ACID 1000 MG: 10 INJECTION, SOLUTION INTRAVENOUS at 02:44

## 2024-01-01 RX ADMIN — MAGNESIUM SULFATE HEPTAHYDRATE 2 G/HR: 40 INJECTION, SOLUTION INTRAVENOUS at 17:32

## 2024-01-01 RX ADMIN — IBUPROFEN 600 MG: 600 TABLET, FILM COATED ORAL at 07:34

## 2024-01-01 RX ADMIN — DIPHENOXYLATE HYDROCHLORIDE AND ATROPINE SULFATE 2 TABLET: 2.5; .025 TABLET ORAL at 03:32

## 2024-01-01 RX ADMIN — MAGNESIUM SULFATE HEPTAHYDRATE 2 G/HR: 40 INJECTION, SOLUTION INTRAVENOUS at 06:48

## 2024-01-01 RX ADMIN — Medication 125 ML/HR: at 03:33

## 2024-01-01 RX ADMIN — IBUPROFEN 600 MG: 600 TABLET, FILM COATED ORAL at 18:47

## 2024-01-01 RX ADMIN — HYDROXYZINE PAMOATE 50 MG: 50 CAPSULE ORAL at 00:16

## 2024-01-01 RX ADMIN — CARBOPROST TROMETHAMINE 250 MCG: 250 INJECTION, SOLUTION INTRAMUSCULAR at 02:40

## 2024-01-01 RX ADMIN — MISOPROSTOL 800 MCG: 200 TABLET ORAL at 02:44

## 2024-01-01 RX ADMIN — SODIUM CHLORIDE, POTASSIUM CHLORIDE, SODIUM LACTATE AND CALCIUM CHLORIDE 75 ML/HR: 600; 310; 30; 20 INJECTION, SOLUTION INTRAVENOUS at 07:39

## 2024-01-01 NOTE — ANESTHESIA POSTPROCEDURE EVALUATION
Patient: Sylvester Clarke    Procedure Summary       Date: 12/31/23 Room / Location:     Anesthesia Start: 1407 Anesthesia Stop: 01/01/24 0233    Procedure: LABOR ANALGESIA Diagnosis:     Scheduled Providers:  Provider: Marlene Scherer MD    Anesthesia Type: epidural ASA Status: 3            Anesthesia Type: epidural    Vitals  Vitals Value Taken Time   /97 01/01/24 0430   Temp 37.4 °C (99.3 °F) 01/01/24 0130   Pulse 106 01/01/24 0430   Resp 16 12/31/23 2201   SpO2 99 % 01/01/24 0430   Vitals shown include unfiled device data.        Post Anesthesia Care and Evaluation    Post Neuraxial Block status: No signs or symptoms of PDPH

## 2024-01-01 NOTE — ANESTHESIA PROCEDURE NOTES
Labor Epidural      Patient reassessed immediately prior to procedure    Patient location during procedure: OB  Start Time: 12/31/2023 2:07 PM  Stop Time: 12/31/2023 2:12 PM  Indication:at surgeon's request  Performed By  Anesthesiologist: Marlene Scherer MD  Preanesthetic Checklist  Completed: patient identified, IV checked, site marked, risks and benefits discussed, surgical consent, monitors and equipment checked, pre-op evaluation and timeout performed  Prep:  Pt Position:sitting  Sterile Tech:cap, gloves, mask and sterile barrier  Prep:chlorhexidine gluconate and isopropyl alcohol  Monitoring:blood pressure monitoring, continuous pulse oximetry and EKG  Epidural Block Procedure:  Approach:midline  Guidance:landmark technique  Location:L4-L5  Needle Type:Tuohy  Needle Gauge:17  Loss of Resistance Medium: saline  Loss of Resistance: 6cm  Cath Depth at skin:10 cm  Paresthesia: none  Aspiration:negative  Test Dose:negative  Number of Attempts: 1  Post Assessment:  Dressing:occlusive dressing applied and secured with tape  Pt Tolerance:patient tolerated the procedure well with no apparent complications  Complications:no

## 2024-01-01 NOTE — PLAN OF CARE
Goal Outcome Evaluation:      IOL for Pre E, cook cath inplace, pt on pitocin, and mag sulfate for HTN. Pt resting in bed with epidural infusing. Mild HA, but not worsening.

## 2024-01-01 NOTE — ANESTHESIA POSTPROCEDURE EVALUATION
"Patient: Sylvester Clarke    Procedure Summary       Date: 12/31/23 Room / Location:     Anesthesia Start: 1407 Anesthesia Stop:     Procedure: LABOR ANALGESIA Diagnosis:     Scheduled Providers:  Provider: Marlene Scherer MD    Anesthesia Type: Not recorded ASA Status: Not recorded            Anesthesia Type: No value filed.    Vitals  Vitals Value Taken Time   /78 01/01/24 1230   Temp 37.2 °C (98.9 °F) 01/01/24 0849   Pulse 91 01/01/24 1235   Resp 16 01/01/24 1215   SpO2 97 % 01/01/24 1235   Vitals shown include unfiled device data.        Post Anesthesia Care and Evaluation    Patient location during evaluation: bedside  Patient participation: complete - patient participated  Level of consciousness: awake  Pain management: adequate    Airway patency: patent  Anesthetic complications: No anesthetic complications  PONV Status: controlled  Cardiovascular status: acceptable  Respiratory status: acceptable  Hydration status: acceptable    Comments: /78   Pulse 89   Temp 37.2 °C (98.9 °F) (Oral)   Resp 16   Ht 170.2 cm (67\")   Wt 88.9 kg (196 lb)   LMP 05/04/2023   SpO2 98%   Breastfeeding Yes   BMI 30.70 kg/m²       "

## 2024-01-01 NOTE — NURSING NOTE
2024@0300 Pt started in recovery post . ASHLEY was placed per Dr Mar. PPH protocol was started post delivery of intact placenta and followed per hospital policy and patient rcvd PPH Meds SEE MAR.     @ 0320 Pt assisted on bedpan for BM. After pt used bedpan moderate amt of bleeding noted around ASHLEY on jeannette pads and in bedpan. Fundal height noted to be increasing on palpation from 1 below U to 1 above U.    @0435 Anesthesia Dr Gutierrez called and notified of pts platelet count> orders rcvd to leave epidural cath in place until platelets are above 70. Call Anesthesia when they reach 70 and when epiduaral cath is pulled     @ 0445 Dr Mar called and notified of bleeding around ASHLEY, and 200 ml of blood in suction canister. Lab work reviewed with Dr Mar and orders rcvd for Platelets x1 unit to be transfused.     @0520 RN at bedside for platelet infusion. Fundus found to be at 2 above U and moderate amt of bright red bleeding noted on jeannette pads, with leaking around ASHLEY of blood. Suction canister now with 250ml of blood. Dr Mar called and requested to come to bedside to eval pt.     @0535 Dr Mar arrived at bedside to eval patient. New orders rcvd for PRBc and repeat labs. See Dr Mar's note for assessment of patient and bleeding.   Dr Mar remains on unit awaiting lab results.    @0555 Dr Mar back at bedside to assess pt and her bleeding. Scant amt of bleeding noted on new jeannette pads.    @0635 Dr Mar called and gave new orders for FFP. Orders placed.     QBL totals:   total 710ml  Post delivery until 7am total     Total QBL at 7am for patient is 1321ml.     @0700 Detailed Bedside hand off report given to Reina GILLIS All IVP pumps checked, and clean jeannette pads applied. Suction canister marked at 275ml to end this shift.

## 2024-01-01 NOTE — NURSING NOTE
Dr Mar called and notified of pts elevated BP's with some severe range pressures but not 2 severe range within 15mins apart. Orders rcvd to start Labetalol HTN protocol only if I have 2 severe range pressures with 15min window like protocol recommends.     1/1/2024@ 0049 Severe HTN protocol started for severe range BP's. Labetalol 20mg IVP given over 5mins.

## 2024-01-01 NOTE — PROGRESS NOTES
I came to assess the patient's bleeding and 250 mL is noted in the suction cannula.  Her Chux pad was changed when I entered the room and I watched the patient for approximately 10 minutes and no active bleeding was seen coming from her vagina.  Her uterus is firm and at the umbilicus.  She is currently receiving 1 unit of platelets due to platelets that were in the 50s after delivery.  I also recommend 1 unit of blood and to recheck labs at this time.  Due to patient's low platelets and minimal bleeding coming from her vagina, I recommend continuing with the mary balloon at this time and administering blood products.  I feel that with her platelets being so low, if I were to remove the mary balloon and do any manipulation of her uterus, bleeding could significantly worsen.  Patient is resting comfortably and pulse was in the 90s while I was in the room.

## 2024-01-01 NOTE — NURSING NOTE
Dr Mar called and notified of blood output on ASHLEY suction of 200ml and bleeding around ASHLEY onto jeannette pads. Dr Mar ordered 1 unit of platelets to be infused now. And to continue to monitor bleeding closely and to call back if bleeding increases.

## 2024-01-01 NOTE — PROGRESS NOTES
Patient was complaining of right upper quadrant pain and RN was given verbal orders to repeat CBC and CMP.  Her lab work now indicates that she has developed hellp syndrome.  I discussed diagnosis with her and her significant other and explained that this is a sequela of preeclampsia.  I removed her Cook catheter and she is 5 cm, 50% effaced, -2 station.  Assisted rupture of membranes was performed for clear fluid and IUPC was easily placed with return of clear fluid in the tubing.  Patient will remain on magnesium sulfate throughout the induction process.  At this time fetal tracing is category 1 and she is progressing in labor and we will continue with trial of labor.

## 2024-01-01 NOTE — NURSING NOTE
Dr Mar on unit. Reviewed FM tracing and was notified of pts continued complaint of RUQ pain. Orders rcvd for rpt CBC and CMP now. Orders rcvd also to removed cook cath balloon at 12 hours if not out within that time frame. Dr Mar remains on unit to review labs at this time.

## 2024-01-01 NOTE — L&D DELIVERY NOTE
Spontaneous vaginal delivery of viable female infant over periurethral skid zaid per Dr Mar. APGARS 8 and 9. Intact placenta delivered spontaneously per Dr Mar. Moderate bleeding noted after delivery of placenta, PPH protocol imitated per Dr Mar orders PPH Meds given per orders of Dr Mar. See MAR for meds and times. ASHLEY was placed and filled with 120ml of NS per Dr Mar.

## 2024-01-01 NOTE — ANESTHESIA PREPROCEDURE EVALUATION
" Anesthesia Evaluation     Patient summary reviewed and Nursing notes reviewed                Airway   Mallampati: II  Dental      Pulmonary - negative pulmonary ROS   Cardiovascular   Exercise tolerance: good (4-7 METS)    (+) hypertension      Neuro/Psych- negative ROS  GI/Hepatic/Renal/Endo - negative ROS     Musculoskeletal (-) negative ROS    Abdominal    Substance History - negative use     OB/GYN    (+) Pregnant, Preeclampsia, pregnancy induced hypertension        Other                    Anesthesia Plan    ASA 3     epidural     (  34w4d    /78   Pulse 89   Temp 37.2 °C (98.9 °F) (Oral)   Resp 16   Ht 170.2 cm (67\")   Wt 88.9 kg (196 lb)   LMP 2023   SpO2 98%   Breastfeeding Yes   BMI 30.70 kg/m²     I have reviewed the patient's history with the patient and the chart, including all pertinent laboratory results and imaging. I have explained the risks of anesthesia including but not limited to dental damage, corneal abrasion, nerve injury, MI, stroke, and death.    )    Anesthetic plan, risks, benefits, and alternatives have been provided, discussed and informed consent has been obtained with: patient.    CODE STATUS:    Code Status (Patient has no pulse and is not breathing): CPR (Attempt to Resuscitate)  Medical Interventions (Patient has pulse or is breathing): Full      "

## 2024-01-01 NOTE — PROGRESS NOTES
Follow up     Lab Results   Component Value Date    WBC 14.06 (H) 01/01/2024    HGB 7.5 (L) 01/01/2024    HCT 22.7 (L) 01/01/2024    MCV 86.6 01/01/2024    PLT 70 (L) 01/01/2024     Lab Results   Component Value Date    GLUCOSE 90 01/01/2024    BUN 11 01/01/2024    CREATININE 0.77 01/01/2024    EGFRRESULT 124.7 12/15/2023    EGFR 109.3 01/01/2024    BCR 14.3 01/01/2024    K 4.7 01/01/2024    CO2 20.0 (L) 01/01/2024    CALCIUM 6.5 (L) 01/01/2024    PROTENTOTREF 6.3 12/15/2023    ALBUMIN 2.8 (L) 01/01/2024    BILITOT 0.7 01/01/2024     (H) 01/01/2024     (H) 01/01/2024     Mg, 7.0 - therapeutic (4.8 to 8.4 range)     Fibrinogen 264   PTT 27.7  PT/INR - 15.0 and 1.16 (elevated)     - interpretation   1) HELLP is improving with decrease in LFTs and platelets up to 70k - repeat in 6 hours to continue to monitor   2) PPH and DIC - Still with elevated PT/INR, so will give 2 more units of FFP to try and improve before removing Joan system     Fernando Ruano MD  1/1/2024  13:29 EST

## 2024-01-01 NOTE — L&D DELIVERY NOTE
[facility]   Vaginal Delivery Note    Patient Name: Sylvester Clarke  : 1995  MRN: [id number]    Date of Delivery: 2024     Diagnosis     Pre & Post-Delivery:  Intrauterine pregnancy at 34w4d  Labor status: Induced Onset of Labor     HELLP syndrome in third trimester    Pre-eclampsia in third trimester     (normal spontaneous vaginal delivery)             Problem List    Transfer to Postpartum     Review the Delivery Report for details.     Delivery     Delivery: Vaginal, Spontaneous     YOB: 2022    Time of Birth:  Gestational Age 2:33 AM   34w4d     Anesthesia: Epidural     Delivering clinician: Mely Mar    Forceps?   No   Vacuum? No    Shoulder dystocia present: No        Delivery narrative: Patient is a 28-year-old G1, P0 who was admitted at 34 weeks after having elevated blood pressures in the office and complaint of headache.  She was admitted to antepartum and underwent a workup that showed slightly elevated LFTs and a 24-hour urine protein that was over 300 mg.  When she was 34 weeks and 3 days, her headache was worsening despite medication.  She still had mild range blood pressures at this time, but due to worsening headache, proceeding with delivery was recommended due to preeclampsia with severe features.  She had received betamethasone for fetal lung maturity.  She was 1 to 2 cm dilated and 50% effaced.  She was transferred to labor and delivery and started on magnesium sulfate for seizure prophylaxis.  A Cook catheter was placed for cervical ripening and she was started on Pitocin.  She did receive an epidural for pain control.  During her induction, she called out complaining of right upper quadrant pain and CBC and CMP were repeated and were significant for hellp syndrome.  Her Cook catheter was removed at approximately 11 hours and she underwent assisted rupture of membranes for clear fluid and IUPC was placed.  She was 5 cm at that time and over  approximately 2 hours, progressed to complete dilation and +3 station.  She was on penicillin for GBS unknown status.    With 2 contractions, she pushed and delivered a liveborn female infant in the occiput anterior position over an intact perineum.  With gentle posterior guidance of the fetal head and maternal pushing, the remainder of the infant was easily delivered and was immediately vigorous and placed on mom's chest.  Delayed cord clamping was performed for 30 seconds and cord was clamped and cut by the father.  Cord blood was collected and sent.  The placenta then delivered spontaneously and was intact with a three-vessel cord.  Placenta was sent to pathology.  Inspection of her perineum revealed bilateral periurethral lacerations that were hemostatic.  Bimanual exam revealed a firm uterus with no remaining products of conception, however her lower uterine segment was boggy and she had a moderate amount of bleeding coming from the lower uterine segment.  She was given 800 mcg of Cytotec rectally, 0.25 mg of Hemabate, and 1 g of tranexamic acid for her bleeding.  Her lower uterine segment would become firm with massage, but with then began bleeding and became boggy once massage was stopped.  At this time, decision was made to place a Joan intrauterine balloon.  Air was removed from the cervical seal and the jaded balloon was easily advanced into the uterus.  120 mL of saline was inserted into the cervical seal and the Joan device was attached to wall suction at 80 mmHg.  At this time, there is no bleeding coming from her vagina and uterus was firm above the pubic symphysis.  A Traore catheter was also placed and we will keep the Joan in place until the morning.  Stat labs have been ordered and we will repeat labs again around 7 AM.  She tolerated the procedure well and all counts were correct at the end of the procedure.      Infant     Findings: female  infant     Infant observations: Weight: 2070 g (4 lb 9  "oz)   Length: 17.5  in  Observations/Comments:  scale 4      Apgars: 8  @ 1 minute /    9  @ 5 minutes   Infant Name: Alondra     Placenta & Cord         Placenta delivered  Spontaneous  at   1/1/2024  2:36 AM     Cord:   present.   Nuchal Cord?  no   Cord blood obtained:     Cord gases obtained:      Cord gas results: Venous:  No results found for: \"PHCVEN\", \"BECVEN\"    Arterial:  No results found for: \"PHCART\", \"BECART\"     Repair     Episiotomy: None     No    Lacerations: Yes  Laceration Information  Laceration Repaired?   Perineal:       Periurethral:  Bilateral  No   Labial:       Sulcus:       Vaginal:       Cervical:            Estimated Blood Loss:       Quantitative Blood Loss: Quantitative Blood Loss (mL): 710 mL (01/01/24 0300)        Complications     HELLP    Disposition     Mother to Remain in LD  in stable condition currently.  Baby to NICU  in stable condition currently.    Mely Mar MD  01/01/24  05:45 EST        "

## 2024-01-01 NOTE — PROGRESS NOTES
[facility]     Progress Note    Patient Name: Sylvester Clarke  : 1995  MRN: [id number]  Primary Care Physician:  Rusty Talley MD  Date of admission: 2023    HD # 5    Subjective   Subjective     Chief Complaint:   Chief Complaint   Patient presents with    Elevated Blood Pressure     Patient presents to labor and delivery triage from the MD office for a blood pressure check and steriods     History of Present Illness  28 y.o.  @ 34w2d - Admitted on Thursday from office by primary OB Dr. Seals for evaluation of hypertensive disorder in pregnancy. Hospital course deteriorated yesterday with diagnosis of pre-eclampsia with severe features based on headache that was worsening. She was transferred to L&D and placed on magnesium sulfate for seizure prophylaxis and started to induce.  She went on to have vaginal delivery early this morning and is PPD#0 currently.  Her intrapartum/postpartum course was complicated by 1) the development of HELLP syndrome and 2) Postpartum hemorrhage - likely related to thrombocytopenia and DIC type of concerns and had Joan system placed.  This morning she still has a headache and vision change. Staff reports bleeding is minimal.      Rh - A positive   Rubella - Immune  Infant - Female in NICU for prematurity but parents report doing well on room air.     Review of Systems   Constitutional:  Negative for fever.   Eyes:  Positive for visual disturbance.   Respiratory:  Negative for shortness of breath.    Cardiovascular:  Negative for chest pain.   Gastrointestinal:  Negative for abdominal pain.   Genitourinary:  Positive for vaginal bleeding. Negative for pelvic pain.   Neurological:  Positive for headaches.   All other systems reviewed and are negative.      Objective   Objective     Vitals:   Temp:  [97.9 °F (36.6 °C)-100.2 °F (37.9 °C)] 98.9 °F (37.2 °C)  Heart Rate:  [] 85  Resp:  [16] 16  BP: ()/() 126/84    Physical Exam  Vitals  reviewed.   Constitutional:       General: She is not in acute distress.     Appearance: Normal appearance. She is normal weight.   Pulmonary:      Effort: Pulmonary effort is normal. No respiratory distress.   Abdominal:      General: There is no distension (fundus firm).      Palpations: Abdomen is soft.      Tenderness: There is no abdominal tenderness.   Musculoskeletal:         General: No tenderness.      Right lower leg: Edema (trace edema) present.      Left lower leg: Edema present.   Skin:     General: Skin is warm and dry.   Neurological:      General: No focal deficit present.      Mental Status: She is alert.      Deep Tendon Reflexes: Reflexes normal (2+ DTRs, no clonus).          Result Review      Lab Results   Component Value Date    WBC 11.02 (H) 2024    HGB 7.9 (L) 2024    HCT 25.1 (L) 2024    MCV 85.1 2024    PLT 62 (L) 2024     Lab Results   Component Value Date    GLUCOSE 97 2024    BUN 9 2024    CREATININE 0.66 2024    EGFRRESULT 124.7 12/15/2023    EGFR 124.2 2024    BCR 13.6 2024    K 3.7 2024    CO2 18.7 (L) 2024    CALCIUM 6.8 (L) 2024    PROTENTOTREF 6.3 12/15/2023    ALBUMIN 3.1 (L) 2024    BILITOT 0.7 2024     (H) 2024     (H) 2024     PTT 26.4, PT 15.4 and 1.21 (elevated PT/INR), fibrinogen low at 216.        Assessment & Plan   Assessment / Plan     Brief Patient Summary:  Sylvestre Clarke is a 28 y.o. female  @ PPD#0   1) Pre-eclampsia - currently with severe features. S/P delivery and on magnesium sulfate for seizure prophylaxis. Blood pressures currently are NOT severe range. She is not on an anti-hypertensive.   2) HELLP syndrome - Appears to have developed during labor with marked increased in LFTs and significant drop in platelets - Last check around 3 hours after delivery, these were still trending up, so repeating labs at noon after 6 hours in the hopes  of seeing the trend start to improve.  3) Postpartum hemorrhage - Agree that DIC most likely etiology for this and has seemed to respond well to the Joan system currently. Per Dr. Mar she has done one pack of platelets, On unit of FFP and One unit of PRBC.  Repeating all labs at noon.  Hopefully if DIC is improving, I can start to consider removing the Joan system and see if the bleeding will respond appropriately. Staff reporting total qualitative blood loss with delivery around 1,600 cc, but chart suggests just under 2,000.       Active Hospital Problems:  Active Hospital Problems    Diagnosis     **HELLP syndrome in third trimester      (normal spontaneous vaginal delivery)     Pre-eclampsia in third trimester      Plan:   - repeat CBC, CMP, Mag level, PT/INR, PTT and fibrinogen around noon.    - if confirms resolution of coagulopathy, could consider turing off Joan and then removing if stable. Otherwise may need to treat whatever looks off.   - continue IV magnesium until at least 24 hours postpartum per protocol.   - Trend anemia as with QBL and having done one unit will need to see if could need further transfusions.   - patient remains complicated and sick, will need L&D or other close observation unit today for sure.     DVT prophylaxis:  Mechanical DVT prophylaxis orders are present.    CODE STATUS:    Code Status (Patient has no pulse and is not breathing): CPR (Attempt to Resuscitate)  Medical Interventions (Patient has pulse or is breathing): Full    Disposition:  I expect patient to be discharged in several days.    Fernando Ruano MD  2024   11:59 EST

## 2024-01-02 LAB
ABO GROUP BLD: NORMAL
ALBUMIN SERPL-MCNC: 3 G/DL (ref 3.5–5.2)
ALBUMIN SERPL-MCNC: 3.1 G/DL (ref 3.5–5.2)
ALBUMIN/GLOB SERPL: 1.3 G/DL
ALBUMIN/GLOB SERPL: 1.4 G/DL
ALP SERPL-CCNC: 108 U/L (ref 39–117)
ALP SERPL-CCNC: 116 U/L (ref 39–117)
ALT SERPL W P-5'-P-CCNC: 207 U/L (ref 1–33)
ALT SERPL W P-5'-P-CCNC: 208 U/L (ref 1–33)
ANION GAP SERPL CALCULATED.3IONS-SCNC: 10.9 MMOL/L (ref 5–15)
ANION GAP SERPL CALCULATED.3IONS-SCNC: 6.4 MMOL/L (ref 5–15)
APTT PPP: 27.7 SECONDS (ref 22.7–35.4)
AST SERPL-CCNC: 174 U/L (ref 1–32)
AST SERPL-CCNC: 208 U/L (ref 1–32)
BACTERIA SPEC AEROBE CULT: ABNORMAL
BASOPHILS # BLD AUTO: 0.01 10*3/MM3 (ref 0–0.2)
BASOPHILS # BLD AUTO: 0.02 10*3/MM3 (ref 0–0.2)
BASOPHILS NFR BLD AUTO: 0.1 % (ref 0–1.5)
BASOPHILS NFR BLD AUTO: 0.2 % (ref 0–1.5)
BH BB BLOOD EXPIRATION DATE: NORMAL
BH BB BLOOD TYPE BARCODE: 6200
BH BB DISPENSE STATUS: NORMAL
BH BB PRODUCT CODE: NORMAL
BH BB UNIT NUMBER: NORMAL
BILIRUB SERPL-MCNC: 0.6 MG/DL (ref 0–1.2)
BILIRUB SERPL-MCNC: 0.8 MG/DL (ref 0–1.2)
BLD GP AB SCN SERPL QL: NEGATIVE
BUN SERPL-MCNC: 10 MG/DL (ref 6–20)
BUN SERPL-MCNC: 13 MG/DL (ref 6–20)
BUN/CREAT SERPL: 13.5 (ref 7–25)
BUN/CREAT SERPL: 16 (ref 7–25)
CALCIUM SPEC-SCNC: 6.6 MG/DL (ref 8.6–10.5)
CALCIUM SPEC-SCNC: 7.2 MG/DL (ref 8.6–10.5)
CHLORIDE SERPL-SCNC: 107 MMOL/L (ref 98–107)
CHLORIDE SERPL-SCNC: 108 MMOL/L (ref 98–107)
CO2 SERPL-SCNC: 22.1 MMOL/L (ref 22–29)
CO2 SERPL-SCNC: 23.6 MMOL/L (ref 22–29)
CREAT SERPL-MCNC: 0.74 MG/DL (ref 0.57–1)
CREAT SERPL-MCNC: 0.81 MG/DL (ref 0.57–1)
CROSSMATCH INTERPRETATION: NORMAL
DEPRECATED RDW RBC AUTO: 43.9 FL (ref 37–54)
DEPRECATED RDW RBC AUTO: 46 FL (ref 37–54)
EGFRCR SERPLBLD CKD-EPI 2021: 102.8 ML/MIN/1.73
EGFRCR SERPLBLD CKD-EPI 2021: 114.6 ML/MIN/1.73
EOSINOPHIL # BLD AUTO: 0.08 10*3/MM3 (ref 0–0.4)
EOSINOPHIL # BLD AUTO: 0.11 10*3/MM3 (ref 0–0.4)
EOSINOPHIL NFR BLD AUTO: 0.6 % (ref 0.3–6.2)
EOSINOPHIL NFR BLD AUTO: 0.8 % (ref 0.3–6.2)
ERYTHROCYTE [DISTWIDTH] IN BLOOD BY AUTOMATED COUNT: 14.8 % (ref 12.3–15.4)
ERYTHROCYTE [DISTWIDTH] IN BLOOD BY AUTOMATED COUNT: 14.9 % (ref 12.3–15.4)
FIBRINOGEN PPP-MCNC: 365 MG/DL (ref 219–464)
GLOBULIN UR ELPH-MCNC: 2.2 GM/DL
GLOBULIN UR ELPH-MCNC: 2.4 GM/DL
GLUCOSE SERPL-MCNC: 129 MG/DL (ref 65–99)
GLUCOSE SERPL-MCNC: 83 MG/DL (ref 65–99)
HCT VFR BLD AUTO: 24.7 % (ref 34–46.6)
HCT VFR BLD AUTO: 27.9 % (ref 34–46.6)
HGB BLD-MCNC: 8.3 G/DL (ref 12–15.9)
HGB BLD-MCNC: 9 G/DL (ref 12–15.9)
IMM GRANULOCYTES # BLD AUTO: 0.23 10*3/MM3 (ref 0–0.05)
IMM GRANULOCYTES # BLD AUTO: 0.26 10*3/MM3 (ref 0–0.05)
IMM GRANULOCYTES NFR BLD AUTO: 1.8 % (ref 0–0.5)
IMM GRANULOCYTES NFR BLD AUTO: 1.9 % (ref 0–0.5)
INR PPP: 1.04 (ref 0.9–1.1)
LYMPHOCYTES # BLD AUTO: 1.55 10*3/MM3 (ref 0.7–3.1)
LYMPHOCYTES # BLD AUTO: 1.77 10*3/MM3 (ref 0.7–3.1)
LYMPHOCYTES NFR BLD AUTO: 12.2 % (ref 19.6–45.3)
LYMPHOCYTES NFR BLD AUTO: 12.8 % (ref 19.6–45.3)
MCH RBC QN AUTO: 27.6 PG (ref 26.6–33)
MCH RBC QN AUTO: 27.9 PG (ref 26.6–33)
MCHC RBC AUTO-ENTMCNC: 32.3 G/DL (ref 31.5–35.7)
MCHC RBC AUTO-ENTMCNC: 33.6 G/DL (ref 31.5–35.7)
MCV RBC AUTO: 83.2 FL (ref 79–97)
MCV RBC AUTO: 85.6 FL (ref 79–97)
MONOCYTES # BLD AUTO: 0.74 10*3/MM3 (ref 0.1–0.9)
MONOCYTES # BLD AUTO: 0.92 10*3/MM3 (ref 0.1–0.9)
MONOCYTES NFR BLD AUTO: 5.3 % (ref 5–12)
MONOCYTES NFR BLD AUTO: 7.2 % (ref 5–12)
NEUTROPHILS NFR BLD AUTO: 10.98 10*3/MM3 (ref 1.7–7)
NEUTROPHILS NFR BLD AUTO: 78 % (ref 42.7–76)
NEUTROPHILS NFR BLD AUTO: 79.1 % (ref 42.7–76)
NEUTROPHILS NFR BLD AUTO: 9.91 10*3/MM3 (ref 1.7–7)
NRBC BLD AUTO-RTO: 0.4 /100 WBC (ref 0–0.2)
NRBC BLD AUTO-RTO: 0.5 /100 WBC (ref 0–0.2)
PLATELET # BLD AUTO: 66 10*3/MM3 (ref 140–450)
PLATELET # BLD AUTO: 75 10*3/MM3 (ref 140–450)
PMV BLD AUTO: 10.1 FL (ref 6–12)
PMV BLD AUTO: 11.1 FL (ref 6–12)
POTASSIUM SERPL-SCNC: 4.1 MMOL/L (ref 3.5–5.2)
POTASSIUM SERPL-SCNC: 4.6 MMOL/L (ref 3.5–5.2)
PROT SERPL-MCNC: 5.2 G/DL (ref 6–8.5)
PROT SERPL-MCNC: 5.5 G/DL (ref 6–8.5)
PROTHROMBIN TIME: 13.8 SECONDS (ref 11.7–14.2)
RBC # BLD AUTO: 2.97 10*6/MM3 (ref 3.77–5.28)
RBC # BLD AUTO: 3.26 10*6/MM3 (ref 3.77–5.28)
RH BLD: POSITIVE
SODIUM SERPL-SCNC: 138 MMOL/L (ref 136–145)
SODIUM SERPL-SCNC: 140 MMOL/L (ref 136–145)
T&S EXPIRATION DATE: NORMAL
UNIT  ABO: NORMAL
UNIT  RH: NORMAL
WBC NRBC COR # BLD AUTO: 12.71 10*3/MM3 (ref 3.4–10.8)
WBC NRBC COR # BLD AUTO: 13.87 10*3/MM3 (ref 3.4–10.8)

## 2024-01-02 PROCEDURE — 85025 COMPLETE CBC W/AUTO DIFF WBC: CPT | Performed by: OBSTETRICS & GYNECOLOGY

## 2024-01-02 PROCEDURE — 85730 THROMBOPLASTIN TIME PARTIAL: CPT | Performed by: OBSTETRICS & GYNECOLOGY

## 2024-01-02 PROCEDURE — 85384 FIBRINOGEN ACTIVITY: CPT | Performed by: OBSTETRICS & GYNECOLOGY

## 2024-01-02 PROCEDURE — 99221 1ST HOSP IP/OBS SF/LOW 40: CPT | Performed by: STUDENT IN AN ORGANIZED HEALTH CARE EDUCATION/TRAINING PROGRAM

## 2024-01-02 PROCEDURE — 80053 COMPREHEN METABOLIC PANEL: CPT | Performed by: OBSTETRICS & GYNECOLOGY

## 2024-01-02 PROCEDURE — 85610 PROTHROMBIN TIME: CPT | Performed by: OBSTETRICS & GYNECOLOGY

## 2024-01-02 RX ORDER — OXYCODONE HYDROCHLORIDE AND ACETAMINOPHEN 5; 325 MG/1; MG/1
1 TABLET ORAL EVERY 4 HOURS PRN
Status: DISCONTINUED | OUTPATIENT
Start: 2024-01-02 | End: 2024-01-04 | Stop reason: HOSPADM

## 2024-01-02 RX ORDER — BUTALBITAL, ACETAMINOPHEN AND CAFFEINE 50; 325; 40 MG/1; MG/1; MG/1
1 TABLET ORAL EVERY 6 HOURS PRN
Status: DISCONTINUED | OUTPATIENT
Start: 2024-01-02 | End: 2024-01-04 | Stop reason: HOSPADM

## 2024-01-02 RX ORDER — OXYCODONE AND ACETAMINOPHEN 10; 325 MG/1; MG/1
1 TABLET ORAL EVERY 4 HOURS PRN
Status: DISCONTINUED | OUTPATIENT
Start: 2024-01-02 | End: 2024-01-04 | Stop reason: HOSPADM

## 2024-01-02 RX ORDER — NIFEDIPINE 30 MG/1
30 TABLET, EXTENDED RELEASE ORAL
Status: DISCONTINUED | OUTPATIENT
Start: 2024-01-02 | End: 2024-01-03

## 2024-01-02 RX ADMIN — DOCUSATE SODIUM 100 MG: 100 CAPSULE, LIQUID FILLED ORAL at 20:38

## 2024-01-02 RX ADMIN — NIFEDIPINE 30 MG: 30 TABLET, FILM COATED, EXTENDED RELEASE ORAL at 09:58

## 2024-01-02 RX ADMIN — OXYCODONE HYDROCHLORIDE AND ACETAMINOPHEN 1 TABLET: 10; 325 TABLET ORAL at 20:38

## 2024-01-02 RX ADMIN — IBUPROFEN 600 MG: 600 TABLET, FILM COATED ORAL at 20:38

## 2024-01-02 RX ADMIN — DOCUSATE SODIUM 100 MG: 100 CAPSULE, LIQUID FILLED ORAL at 09:17

## 2024-01-02 RX ADMIN — OXYCODONE HYDROCHLORIDE AND ACETAMINOPHEN 1 TABLET: 10; 325 TABLET ORAL at 12:01

## 2024-01-02 RX ADMIN — IBUPROFEN 600 MG: 600 TABLET, FILM COATED ORAL at 05:03

## 2024-01-02 RX ADMIN — BUTALBITAL, ACETAMINOPHEN AND CAFFEINE 1 TABLET: 325; 50; 40 TABLET ORAL at 05:13

## 2024-01-02 RX ADMIN — OXYCODONE HYDROCHLORIDE AND ACETAMINOPHEN 1 TABLET: 10; 325 TABLET ORAL at 16:34

## 2024-01-02 RX ADMIN — MAGNESIUM OXIDE 400 MG (241.3 MG MAGNESIUM) TABLET 400 MG: TABLET at 09:17

## 2024-01-02 RX ADMIN — Medication 1 TABLET: at 09:18

## 2024-01-02 RX ADMIN — IBUPROFEN 600 MG: 600 TABLET, FILM COATED ORAL at 11:33

## 2024-01-02 NOTE — LACTATION NOTE
This note was copied from a baby's chart.  Patient transferred to Mother/Baby, no pumping supplies brought with her.  HGP and supplies provided.  Patient reports she is going to attempt to breast feed infant in the NICU.  Encouraged patient to pump upon returning from NICU if no latch or infant not vigorous at the breast.  LC number on WB, will follow as needed.

## 2024-01-02 NOTE — CONSULTS
"Neurology Consult Note    Consult Date: 1/2/2024    Referring MD: Rusyt Seals*    Reason for Consult I have been asked to see the patient in neurological consultation to render advice and opinion regarding headache    Sylvester Clarke is a 28 y.o. female with past medical history of hypertension.  Patient was G1, P0 at 34 weeks and has been admitted on 12/28/2023.  Her pregnancy was complicated by Eclampsia with HELLP syndrome.    Patient after delivery yesterday started having severe headache which was not improved by magnesium oxide and Fioricet.  Later on she got 1 dose of Fioricet after which her headache right now is that 2-3/10.     States she has a history of headaches for which she takes ibuprofen/Tylenol, is not very common and happens once every couple of months.    Patient at bedside today morning states she has a very mild headache without any vision symptoms or any nausea vomiting or photophobia phonophobia.  She denies any motor weakness, numbness speech issues.    She never had a history of blood clots/DVTs/PE.      Past Medical History:   Diagnosis Date    Chlamydia 2016 2023    COVID-19 01/2023    1/2022 10/2021-Vaxxed and Boosted       Exam  /84 (BP Location: Left arm, Patient Position: Lying)   Pulse 82   Temp 98.3 °F (36.8 °C) (Oral)   Resp 18   Ht 170.2 cm (67\")   Wt 88.9 kg (196 lb)   LMP 05/04/2023   SpO2 99%   Breastfeeding Yes   BMI 30.70 kg/m²   Gen: NAD, vitals reviewed  MS: oriented x3, recent/remote memory intact, normal attention/concentration, language intact, no neglect.  CN: visual acuity grossly normal, PERRL, EOMI, no facial droop, no dysarthria  Motor: 5/5 throughout upper and lower extremities, normal tone    DATA:    Lab Results   Component Value Date    GLUCOSE 129 (H) 01/02/2024    CALCIUM 7.2 (L) 01/02/2024     01/02/2024    K 4.1 01/02/2024    CO2 22.1 01/02/2024     01/02/2024    BUN 13 01/02/2024    CREATININE 0.81 01/02/2024    " BCR 16.0 01/02/2024    ANIONGAP 10.9 01/02/2024     Lab Results   Component Value Date    WBC 13.87 (H) 01/02/2024    HGB 9.0 (L) 01/02/2024    HCT 27.9 (L) 01/02/2024    MCV 85.6 01/02/2024    PLT 75 (L) 01/02/2024       Lab review:   Sodium 140  Creatinine 0.81  Blood glucose 129     and     WBC 13.87  Hemoglobin 9.0 and platelets 75    Imaging review:   No brain imaging available at this admission    Diagnoses:  Headache without migrainous features or red flags    Pre-stroke MRS: 0  NIHSS: 0    Sylvester Clarke is a 28 y.o. female with past medical history of hypertension.  Patient was G1, P0 at 34 weeks and has been admitted on 12/28/2023.  Her pregnancy was complicated by Eclampsia with HELLP syndrome.    Patient after delivery yesterday started having severe headache which was not improved by magnesium oxide and Fioricet.  Later on she got 1 dose of Fioricet after which her headache right now is that 2-3/10.     Recommendations    CTV to rule out any cerebral sinus venous thrombosis considering hypercoagulable state during pregnancy    If headache reoccurs can try a cocktail of Compazine/Benadryl/Zofran instead of narcotics.    Patient's examination is completely benign without any red flags concerning for a stroke or hemorrhage, spoke about CTV to rule out CSVT and also about migraine cocktail with patient they would like to hold off for now and see how her headache does.        MDM   Reviewed: Previous charts, nursing notes and vitals   Reviewed: Previous labs    Interpretation: Labs   Total time providing care is :30-74 minutes. This excluded time spent performing separately reportable procedures and services  Consults :Neurology/Stroke    Please note that portions of this note were completed with a voice recognition program.     Ender Campbell MD  Neuro Hospitalist /Vascular Neurology.

## 2024-01-02 NOTE — LACTATION NOTE
This note was copied from a baby's chart.  P1 34w5d baby in NICU. Mom remains in L/D, post-Magnesium. She has headache now,room completely dark. She reports she has been pumping with HGP every 3hrs.  She does not know how much milk she has been pumping and she states the nurses are collecting the milk and cleaning pump parts. Reviewed HGP operation. Answered questions regarding breast feeding her baby. Encouraged rest, hydration and continue pumping every 3hrs if she is feeling well if not then she can slowly build up to every 3 hrs. She has personal breast pump.

## 2024-01-02 NOTE — PROGRESS NOTES
Repeated labs post transfusion this AM     Temp:  [98.6 °F (37 °C)-100.9 °F (38.3 °C)] 98.7 °F (37.1 °C)  Heart Rate:  [] 75  Resp:  [16] 16  BP: ()/(57-96) 113/69      Lab Results   Component Value Date    WBC 12.71 (H) 01/02/2024    HGB 8.3 (L) 01/02/2024    HCT 24.7 (L) 01/02/2024    MCV 83.2 01/02/2024    PLT 66 (L) 01/02/2024     Lab Results   Component Value Date    GLUCOSE 83 01/02/2024    BUN 10 01/02/2024    CREATININE 0.74 01/02/2024    EGFRRESULT 124.7 12/15/2023    EGFR 114.6 01/02/2024    BCR 13.5 01/02/2024    K 4.6 01/02/2024    CO2 23.6 01/02/2024    CALCIUM 6.6 (L) 01/02/2024    PROTENTOTREF 6.3 12/15/2023    ALBUMIN 3.0 (L) 01/02/2024    BILITOT 0.8 01/02/2024     (H) 01/02/2024     (H) 01/02/2024     1) HELLP continues to improve with platelets trending up to 66,000. Per anesthesia - still has epidural catheter in, they want > 70,000 to remove and are okay waiting a little longer to see if that will happen later today - LFTs continue to trend down slowly     2) Postpartum hemorrhage - her ASHLEY has been out for awhile with no further significant bleeding.     3) Anemia - acute blood loss from delivery. S/P 3 units of PRBC over last 24+ hours. Her Hg this Am is 8.3 g which is reasonable.     4) pre-eclampsia with severe features - s/p magnesium seizure prophylaxis.  Watching on unit given her above complexities until later this morning when oncoming team has time to consider transition to mother baby. Still with headache per staff, giving Fioricet at this time     Fernando Ruano MD  1/2/2024  05:10 EST

## 2024-01-02 NOTE — LACTATION NOTE
This note was copied from a baby's chart.  Helped Mom try to latch baby. Unable to hand express milk prior to latch. We tried breast feeding bilaterally with and without 24mm NS, in different positions. Baby nursed somewhat better without the NS but suckle was non-nutritive and she came off the breast rooting and crying. Mom wanted to stop, baby placed STS and RN notified.

## 2024-01-02 NOTE — NURSING NOTE
Dr Campbell at bedside to assess patient and epidural site. Epidural dressing intact. Orders rcvd to leave epidural cath until platelets are 70 or above before pulling epidural cath.  Update to Dr Ruano given. Only scant amt of bleeding noted on jeannette pad since removal of ASHLEY. Vitals remain stable. Orders rcvd for pt to have small meal if she wishes. Will continue to monitor patient closely.

## 2024-01-02 NOTE — NURSING NOTE
Dr Ruano at bedside, ASHLEY removed scant amt of bleeding noted on jeannette pads. Jeannette care provided. Fundus firm at U. Pt tolerated well. FOB remains at bedside with patient for support.

## 2024-01-02 NOTE — PROGRESS NOTES
OB Note   Follow up    Overall output from ASHLEY has been minimal from late morning into afternoon.      Lab Results   Component Value Date    WBC 12.44 (H) 01/01/2024    HGB 6.6 (C) 01/01/2024    HCT 19.4 (C) 01/01/2024    MCV 85.5 01/01/2024    PLT 59 (L) 01/01/2024     Lab Results   Component Value Date    GLUCOSE 84 01/01/2024    BUN 10 01/01/2024    CREATININE 0.77 01/01/2024    EGFRRESULT 124.7 12/15/2023    EGFR 109.3 01/01/2024    BCR 13.0 01/01/2024    K 4.6 01/01/2024    CO2 25.0 01/01/2024    CALCIUM 6.9 (L) 01/01/2024    PROTENTOTREF 6.3 12/15/2023    ALBUMIN 3.1 (L) 01/01/2024    BILITOT 0.6 01/01/2024     (H) 01/01/2024     (H) 01/01/2024     PTT 27.4  PT/INR = 14.1 and 1.07 (normal range)   Fibrinogen 320    A/P   1) Pre-eclampsia with severe features. Her blood pressure remains mild to normal. Urine output has significantly increased.   2) HELLP - Liver enzymes continue to trend down. Platelets are still significantly low at 59,000 which is down from the 70k at noon, but still reasonable. Discussed with Dr. Francis who said okay to trend as long as > 50,000.    3) Postpartum hemorrhage with anemia,  Hg down to 6.6 g at this time, given concern for continued bleeding. Giving 2 units of PRBC now and will continue to follow trend. I have decompressed the ASHLEY and we are seeing if the bleeding has in fact slowed down with everything off suction. I Hope to remove soon in an effort to try and remove device.      Fernando Ruano MD  1/1/2024  19:23 EST

## 2024-01-02 NOTE — PROGRESS NOTES
Joan removed after approximately one hour. Little initial issue with off suction and balloons deflated.     Will watch for next few hours and go from there.     Repeat labs 4 hours after transfusions are in.     Fernando Ruano MD  1/1/2024  20:20 EST

## 2024-01-02 NOTE — NURSING NOTE
Patient continuously monitored throughout the day for PreE/HELLP/PPH. Overall, most labs have trended in a positive direction but still in critically low range. Since delivery the patient has received a total of 1 unit of platelets, 3 units of FFP, 1 unit of PRBC's with one currently infusing and 1 more unit to be administered. Patients VS have remained stable and bleeding was minimal throughout the day with the use of ASHLEY. Patient was able to utilize the breast pump twice today and milk was brought to NICU. Patients S/O has remained at BS for the majority of the day and has been supportive and interactive with patient.

## 2024-01-02 NOTE — PROGRESS NOTES
Postpartum Progress Note      Status post Vaginal Delivery: Postoperative course complicated by hellp syndrome and DIC    1) HELLP--patient's magnesium was discontinued overnight.  She does have mild range blood pressures and I will start her on 30 mg XL of Procardia.  Her most recent platelets have increased over 70.  Liver enzymes have been trending down.      2) DIC --her hemoglobin is stable at 9.  She has received a total of 1 unit of platelets, 3 units of packed red blood cells, and 3 units of FFP.  Bleeding has been minimal since removal of the intrauterine balloon.    3) headache--patient continues to complain of a severe headache that has not improved since delivery.  It does not improve with Fioricet.  Given the persistence of this headache, I will have neurology see her for any further recommendations.  I have also started magnesium oxide daily.    Rh status: A positive  Rubella: Immune  Gender: Female--in NICU due to prematurity    Subjective     Postpartum Day 1: Vaginal delivery    The patient complains of a headache. The patient denies emotional concerns. Pain is well controlled with current medications. The baby is well. The patient is not ambulating well. The patient is tolerating a normal diet.  She is denying any right upper quadrant pain.    Objective     Vital signs in last 24 hours:  Temp:  [98.6 °F (37 °C)-100.9 °F (38.3 °C)] 98.9 °F (37.2 °C)  Heart Rate:  [] 78  Resp:  [16-17] 17  BP: (113-163)/(64-97) 137/92      General:    alert, appears stated age, and cooperative   Abdomen:  Soft, Non-tender    Lochia:  appropriate   Uterine Fundus:   firm   Ext    Edema trace, reflexes are 1+ with no clonus   DVT Evaluation:  No evidence of DVT seen on physical exam.     Lab Results   Component Value Date    WBC 13.87 (H) 01/02/2024    HGB 9.0 (L) 01/02/2024    HCT 27.9 (L) 01/02/2024    MCV 85.6 01/02/2024    PLT 75 (L) 01/02/2024       Mely Mar MD  1/2/2024  10:31 EST

## 2024-01-03 ENCOUNTER — APPOINTMENT (OUTPATIENT)
Dept: CT IMAGING | Facility: HOSPITAL | Age: 29
End: 2024-01-03
Payer: COMMERCIAL

## 2024-01-03 LAB
ALBUMIN SERPL-MCNC: 3.1 G/DL (ref 3.5–5.2)
ALBUMIN/GLOB SERPL: 1.3 G/DL
ALP SERPL-CCNC: 113 U/L (ref 39–117)
ALT SERPL W P-5'-P-CCNC: 133 U/L (ref 1–33)
ANION GAP SERPL CALCULATED.3IONS-SCNC: 9.8 MMOL/L (ref 5–15)
AST SERPL-CCNC: 61 U/L (ref 1–32)
BILIRUB SERPL-MCNC: 0.2 MG/DL (ref 0–1.2)
BUN SERPL-MCNC: 9 MG/DL (ref 6–20)
BUN/CREAT SERPL: 13.4 (ref 7–25)
CALCIUM SPEC-SCNC: 8.4 MG/DL (ref 8.6–10.5)
CHLORIDE SERPL-SCNC: 106 MMOL/L (ref 98–107)
CO2 SERPL-SCNC: 21.2 MMOL/L (ref 22–29)
CREAT SERPL-MCNC: 0.67 MG/DL (ref 0.57–1)
DEPRECATED RDW RBC AUTO: 49.1 FL (ref 37–54)
EGFRCR SERPLBLD CKD-EPI 2021: 123.8 ML/MIN/1.73
ERYTHROCYTE [DISTWIDTH] IN BLOOD BY AUTOMATED COUNT: 15.6 % (ref 12.3–15.4)
GLOBULIN UR ELPH-MCNC: 2.4 GM/DL
GLUCOSE SERPL-MCNC: 100 MG/DL (ref 65–99)
HCT VFR BLD AUTO: 26.7 % (ref 34–46.6)
HGB BLD-MCNC: 8.9 G/DL (ref 12–15.9)
MAGNESIUM SERPL-MCNC: 2.2 MG/DL (ref 1.6–2.6)
MCH RBC QN AUTO: 29.1 PG (ref 26.6–33)
MCHC RBC AUTO-ENTMCNC: 33.3 G/DL (ref 31.5–35.7)
MCV RBC AUTO: 87.3 FL (ref 79–97)
PLATELET # BLD AUTO: 99 10*3/MM3 (ref 140–450)
PMV BLD AUTO: 10.6 FL (ref 6–12)
POTASSIUM SERPL-SCNC: 4 MMOL/L (ref 3.5–5.2)
PROT SERPL-MCNC: 5.5 G/DL (ref 6–8.5)
RBC # BLD AUTO: 3.06 10*6/MM3 (ref 3.77–5.28)
SODIUM SERPL-SCNC: 137 MMOL/L (ref 136–145)
WBC NRBC COR # BLD AUTO: 13.68 10*3/MM3 (ref 3.4–10.8)

## 2024-01-03 PROCEDURE — 85027 COMPLETE CBC AUTOMATED: CPT | Performed by: OBSTETRICS & GYNECOLOGY

## 2024-01-03 PROCEDURE — 0503F POSTPARTUM CARE VISIT: CPT

## 2024-01-03 PROCEDURE — 99233 SBSQ HOSP IP/OBS HIGH 50: CPT | Performed by: NURSE PRACTITIONER

## 2024-01-03 PROCEDURE — 80053 COMPREHEN METABOLIC PANEL: CPT | Performed by: OBSTETRICS & GYNECOLOGY

## 2024-01-03 PROCEDURE — 83735 ASSAY OF MAGNESIUM: CPT | Performed by: NURSE PRACTITIONER

## 2024-01-03 PROCEDURE — 25510000001 IOPAMIDOL PER 1 ML: Performed by: OBSTETRICS & GYNECOLOGY

## 2024-01-03 PROCEDURE — 70496 CT ANGIOGRAPHY HEAD: CPT

## 2024-01-03 RX ORDER — DIPHENHYDRAMINE HYDROCHLORIDE 50 MG/ML
25 INJECTION INTRAMUSCULAR; INTRAVENOUS EVERY 8 HOURS PRN
Status: DISCONTINUED | OUTPATIENT
Start: 2024-01-03 | End: 2024-01-04 | Stop reason: HOSPADM

## 2024-01-03 RX ORDER — PROCHLORPERAZINE EDISYLATE 5 MG/ML
10 INJECTION INTRAMUSCULAR; INTRAVENOUS EVERY 8 HOURS PRN
Status: DISCONTINUED | OUTPATIENT
Start: 2024-01-03 | End: 2024-01-04 | Stop reason: HOSPADM

## 2024-01-03 RX ADMIN — OXYCODONE HYDROCHLORIDE AND ACETAMINOPHEN 1 TABLET: 10; 325 TABLET ORAL at 20:29

## 2024-01-03 RX ADMIN — MAGNESIUM OXIDE 400 MG (241.3 MG MAGNESIUM) TABLET 400 MG: TABLET at 09:19

## 2024-01-03 RX ADMIN — Medication 1 TABLET: at 09:19

## 2024-01-03 RX ADMIN — LABETALOL HYDROCHLORIDE 300 MG: 200 TABLET, FILM COATED ORAL at 10:48

## 2024-01-03 RX ADMIN — OXYCODONE HYDROCHLORIDE AND ACETAMINOPHEN 1 TABLET: 10; 325 TABLET ORAL at 06:05

## 2024-01-03 RX ADMIN — DOCUSATE SODIUM 100 MG: 100 CAPSULE, LIQUID FILLED ORAL at 20:30

## 2024-01-03 RX ADMIN — DOCUSATE SODIUM 100 MG: 100 CAPSULE, LIQUID FILLED ORAL at 09:19

## 2024-01-03 RX ADMIN — IBUPROFEN 600 MG: 600 TABLET, FILM COATED ORAL at 20:30

## 2024-01-03 RX ADMIN — OXYCODONE HYDROCHLORIDE AND ACETAMINOPHEN 1 TABLET: 10; 325 TABLET ORAL at 14:34

## 2024-01-03 RX ADMIN — IBUPROFEN 600 MG: 600 TABLET, FILM COATED ORAL at 14:34

## 2024-01-03 RX ADMIN — LABETALOL HYDROCHLORIDE 300 MG: 200 TABLET, FILM COATED ORAL at 23:53

## 2024-01-03 RX ADMIN — NIFEDIPINE 30 MG: 30 TABLET, FILM COATED, EXTENDED RELEASE ORAL at 09:19

## 2024-01-03 RX ADMIN — IOPAMIDOL 95 ML: 755 INJECTION, SOLUTION INTRAVENOUS at 16:33

## 2024-01-03 RX ADMIN — IBUPROFEN 600 MG: 600 TABLET, FILM COATED ORAL at 06:05

## 2024-01-03 NOTE — LACTATION NOTE
"This note was copied from a baby's chart.  Mom is exhausted. She continues to pump every 3 hrs with HGP and is getting \"a little bit\". Reviewed cleaning and sterilizing of pump parts and encouraged to call for any assistance or questions.  "

## 2024-01-03 NOTE — LACTATION NOTE
Mom reported pain with pumping and after observing her pump her flange size was increased to 27mm for better fit and she is getting around 5 mls now. Mom reports pumping is feeling much better. Discussed switching to maintenance mode when her volume increases to around 20mls.

## 2024-01-03 NOTE — PROGRESS NOTES
DOS: 1/3/2024  NAME: Sylvester Clarke   : 1995  PCP: Rusty Talley MD    Chief Complaint   Patient presents with    Elevated Blood Pressure     Patient presents to labor and delivery triage from the MD office for a blood pressure check and steriods         Subjective: Pt seen in follow up, however the problem is new to me.  Lying in bed with her arm over her face with bright light on.  States headache did come back.  Right now she feels it is more tolerable.  RN reports she had a dizzy spell coming back from the NICU visiting the baby.  No new weakness, numbness, speech or visual disturbances, or ataxia.     Objective:  Vital signs:   Vitals:    24 0316 24 0720 24 1000 24 1150   BP: 145/91  Comment: rn notified 141/94 145/95 127/78   BP Location: Left arm Left arm Left arm Left arm   Patient Position: Lying Sitting Sitting Lying   Pulse: 84 77 75 82   Resp: 18 16  18   Temp: 97.6 °F (36.4 °C) 98.1 °F (36.7 °C)  97.2 °F (36.2 °C)   TempSrc: Oral Oral  Oral   SpO2:   99%    Weight:       Height:           Current Facility-Administered Medications:     all purpose nipple ointment, , Topical, 4x Daily PRN, Mely Mar MD    benzocaine (AMERICAINE) 20 % rectal ointment, , Rectal, PRN, Mely Mar MD    benzocaine-menthol (DERMOPLAST) 20-0.5 % topical spray, , Topical, PRN, Mely Mar MD    bisacodyl (DULCOLAX) suppository 10 mg, 10 mg, Rectal, Daily PRN, Mely Mar MD    butalbital-acetaminophen-caffeine (FIORICET, ESGIC) -40 MG per tablet 1 tablet, 1 tablet, Oral, Q6H PRN, Fernando Ruano MD, 1 tablet at 24 0513    diphenhydrAMINE (BENADRYL) injection 25 mg, 25 mg, Intravenous, Q8H PRN, Teresita Ferris APRN    diphenoxylate-atropine (LOMOTIL) 2.5-0.025 MG per tablet 1 tablet, 1 tablet, Oral, Q1H PRN, Mely Mar MD, 2 tablet at 24 0332    docusate sodium (COLACE) capsule 100 mg, 100 mg, Oral, BID, Mely Mar,  MD, 100 mg at 01/03/24 0919    hydrALAZINE (APRESOLINE) injection 5-10 mg, 5-10 mg, Intravenous, Q20 Min PRN **OR** labetalol (NORMODYNE,TRANDATE) injection 20-80 mg, 20-80 mg, Intravenous, Q10 Min PRN, 20 mg at 01/01/24 0049 **OR** [DISCONTINUED] NIFEdipine (PROCARDIA) capsule 10-20 mg, 10-20 mg, Oral, Q20 Min PRN, Mely Mar MD    Hydrocort-Pramoxine (Perianal) (PROCTOFOAM-HS) 1-1 % rectal foam 1 application , 1 application , Topical, PRN, Mely Mar MD    Hydrocortisone (Perianal) (ANUSOL-HC) 2.5 % rectal cream, , Rectal, PRN, Mely Mar MD    hydrOXYzine pamoate (VISTARIL) capsule 50 mg, 50 mg, Oral, TID PRN, Mely Mar MD, 50 mg at 01/01/24 0016    ibuprofen (ADVIL,MOTRIN) tablet 600 mg, 600 mg, Oral, Q6H PRN, Mely Mar MD, 600 mg at 01/03/24 1434    labetalol (NORMODYNE) tablet 300 mg, 300 mg, Oral, Q12H, Rusty Seals MD, 300 mg at 01/03/24 1048    lactated ringers infusion, 75 mL/hr, Intravenous, Continuous, Mely Mar MD, Stopped at 01/02/24 0233    magnesium hydroxide (MILK OF MAGNESIA) suspension 10 mL, 10 mL, Oral, Daily PRN, Mely Mar MD    magnesium oxide (MAG-OX) tablet 400 mg, 400 mg, Oral, Daily, Mely Mar MD, 400 mg at 01/03/24 0919    metoclopramide (REGLAN) injection 10 mg, 10 mg, Intravenous, Q8H PRN, Mely Mar MD    ondansetron ODT (ZOFRAN-ODT) disintegrating tablet 4 mg, 4 mg, Oral, Q6H PRN **OR** ondansetron (ZOFRAN) injection 4 mg, 4 mg, Intravenous, Q6H PRN, Mely Mar MD    oxyCODONE-acetaminophen (PERCOCET) 5-325 MG per tablet 1 tablet, 1 tablet, Oral, Q4H PRN **OR** oxyCODONE-acetaminophen (PERCOCET)  MG per tablet 1 tablet, 1 tablet, Oral, Q4H PRN, Mely Mar MD, 1 tablet at 01/03/24 1434    prenatal vitamin tablet 1 tablet, 1 tablet, Oral, Daily, Mely Mar MD, 1 tablet at 01/03/24 0919    prochlorperazine (COMPAZINE) injection 10 mg, 10 mg, Intravenous, Q8H PRN, [place]  CHARITY Lorenzo    promethazine (PHENERGAN) tablet 25 mg, 25 mg, Oral, Q6H PRN **OR** promethazine (PHENERGAN) suppository 12.5 mg, 12.5 mg, Rectal, Q6H PRN, Mely Mar MD    sodium chloride 0.9 % flush 1-10 mL, 1-10 mL, Intravenous, PRN, Mely Mar MD    traMADol (ULTRAM) tablet 50 mg, 50 mg, Oral, Q6H PRN, Mely Mar MD    PRN meds    all purpose nipple ointment    benzocaine    benzocaine-menthol    bisacodyl    butalbital-acetaminophen-caffeine    diphenhydrAMINE    diphenoxylate-atropine    hydrALAZINE **OR** labetalol **OR** [DISCONTINUED] NIFEdipine    pramoxine-hydrocortisone    Hydrocortisone (Perianal)    hydrOXYzine pamoate    ibuprofen    magnesium hydroxide    metoclopramide    ondansetron ODT **OR** ondansetron    oxyCODONE-acetaminophen **OR** oxyCODONE-acetaminophen    prochlorperazine    promethazine **OR** promethazine    sodium chloride    traMADol    No current facility-administered medications on file prior to encounter.     Current Outpatient Medications on File Prior to Encounter   Medication Sig    prenatal vitamin (prenatal, CLASSIC, vitamin) tablet Take  by mouth Daily.    diphenhydrAMINE (BENADRYL) 25 mg capsule Take 1 capsule by mouth Every 6 (Six) Hours As Needed for Itching. Since August 10th per OB/GYN. Taken QHS only.    hydrOXYzine (ATARAX) 25 MG tablet Take 1 tablet by mouth 3 (Three) Times a Day As Needed for Itching.    methylPREDNISolone (MEDROL) 4 MG dose pack Take as directed on package instructions.    promethazine (PHENERGAN) 12.5 MG tablet Take 1 tablet by mouth Every 6 (Six) Hours As Needed for Nausea or Vomiting.       General appearance: Young pleasant female, NAD, alert and cooperative, well groomed  HEENT: Normocephalic, atraumatic, PERRL, no masses or tenderness  Resp: Even and unlabored  Skin: warm, dry    Neurological:   MS: oriented x3, recent/remote memory intact, normal attention/concentration, language intact, no neglect, normal fund of  "knowledge  CN: visual acuity grossly normal, visual fields full, PERRL, EOMI, facial sensation equal, no facial droop, palate elevates symmetrically, shoulder shrug equal, tongue midline  Motor: 5/5 in all 4 ext., normal tone  Sensory: light touch and cold sensation intact in all 4 ext.  Coordination: Normal finger to nose test  Gait and station: Deferred  Rapid alternating movements: normal finger to thumb tap    Laboratory results:  No results found for: \"TSH\"  No results found for: \"[id number]\"  No results found for: \"HGBA1C\"  Lab Results   Component Value Date    GLUCOSE 100 (H) 01/03/2024    BUN 9 01/03/2024    CREATININE 0.67 01/03/2024    BCR 13.4 01/03/2024    K 4.0 01/03/2024    CO2 21.2 (L) 01/03/2024    CALCIUM 8.4 (L) 01/03/2024    PROTENTOTREF 6.3 12/15/2023    ALBUMIN 3.1 (L) 01/03/2024    LABIL2 1.4 12/15/2023    AST 61 (H) 01/03/2024     (H) 01/03/2024     Lab Results   Component Value Date    WBC 13.68 (H) 01/03/2024    HGB 8.9 (L) 01/03/2024    HCT 26.7 (L) 01/03/2024    MCV 87.3 01/03/2024    PLT 99 (L) 01/03/2024     Brief Urine Lab Results  (Last result in the past 365 days)        Color   Clarity   Blood   Leuk Est   Nitrite   Protein   CREAT   Urine HCG        12/28/23 1048             45.2               No results found for: \"ACANTHNAEG\", \"AFBCX\", \"BPERTUSSISCX\", \"BLOODCX\"  No results found for: \"BCIDPCR\", \"CXREFLEX\", \"CSFCX\", \"CULTURETIS\"  No results found for: \"CULTURES\", \"HSVCX\", \"URCX\"  No results found for: \"EYECULTURE\", \"GCCX\", \"HSVCULTURE\", \"LABHSV\"  No results found for: \"LEGIONELLA\", \"MRSACX\", \"MUMPSCX\", \"MYCOPLASCX\"  No results found for: \"NOCARDIACX\", \"STOOLCX\"  No results found for: \"THROATCX\", \"UNSTIMCULT\", \"URINECX\", \"CULTURE\", \"VZVCULTUR\"  No results found for: \"VIRALCULTU\", \"WOUNDCX\"  Pain Management Panel  More data exists         Latest Ref Rng & Units 12/28/2023 12/15/2023   Pain Management Panel   Creatinine, Urine mg/dL 45.2  222.5        Review and interpretation " of imaging:  No radiology results for the last 7 days      Impression/Assessment:  This is a 28-year-old female with past medical history of migraine headache, hypertension who presented to the hospital 12/28/2023 initially for observation of the headache after she presented with complaints of a severe headache and was found to have elevated protein and blood pressures.  She was diagnosed with preeclampsia and help syndrome.  She eventually underwent an induction for onset of labor and delivered her baby vaginally on 1/1.  Our service was consulted by OB/GYN due to her persistent headache.    She describes a history of headaches which she thinks are migraine headaches.  Typically they only occur a couple months out of the year.  They do usually resolve with ibuprofen and Tylenol.  She received magnesium oxide and Fioricet this admission with initial relief but then headache did recur.  In addition she has also been taking Percocet and ibuprofen for labor discomfort and received several grams of magnesium sulfate for seizure prophylaxis due to her eclampsia..  We had recommended a CTV to rule out any cerebral sinus venous thrombosis considering hypercoagulable state due to her pregnancy however she opted to hold off.    Today her headache has recurred, she appeared to have photophobia on exam.  She also had some dizziness, lightheadedness, and shortness of breath today.  RN reports she appeared to be in a pretty significant amount of pain.    Diagnosis:  Acute headache with photophobia  Hypertension  Status post vaginal delivery  HELLP syndrome     Plan:  -Given headache recurrence with no complete resolution and new symptoms of dizziness I had a lengthy conversation with the patient that I recommended that she obtain the CTV due to her risk for hypercoagulable state currently secondary to her pregnancy.  She is agreeable to proceed today.    - I discussed with the radiology tech Emelia, she states that she can  proceed with the scan while patient is breast-feeding however they recommended that she discard of the milk that she pumps 1 hour after her scan. I recommended to the patient that she pump prior to going down in order to have that milk for the baby.  -Discussed with her OB/GYN Dr. Seals, she is okay to receive Benadryl 25 mg and Compazine 10 mg IV x 3 doses every 8 hours apart for her headache now.  Typically we also add on magnesium sulfate 1 g IV x 1 dose but given that she received several doses of this during her delivery I will go ahead and check a mag level today.  If this is 2.0 or less okay to proceed with administering.  -Discussed avoiding excessive narcotics due to concern for rebound/overuse headache given her history of migraine headache.  -She can follow-up in the outpatient setting in our office for migraine headache treatment in the future.  Will follow.    Case discussed with patient, Dr. Seals, RN, and Dr. Campbell, and he agrees with plan above.     CHARITY Villarreal

## 2024-01-03 NOTE — PROGRESS NOTES
VAGINAL DELIVERY POSTPARTUM DAY 2    1/3/2024    SUBJECTIVE   Sylvester is tearful this morning.   Patient describes her lochia as less than menses.  Pain is well controlled from delivery and she is still experiencing a headache. She was seen by neurology and they prescribed fioricet which provided minimal relief for her.   She reports that given her diagnosis of HELLP in this pregnancy, she would like to stay another day and speak to Dr. Seals prior to discharge.   Pressures 140'/90s this AM.   Awaiting lab results from this AM.        OBJECTIVE   Temp: Temp:  [97.6 °F (36.4 °C)-98.8 °F (37.1 °C)] 98.1 °F (36.7 °C) Temp src: Oral   BP: BP: (128-153)/(83-99) 145/95        Pulse: Heart Rate:  [75-84] 75  RR: Resp:  [16-18] 16    General:  No acute distress   Abdomen: Fundus firm and beneath umbilicus   Pelvis: deferred     Lab Results   Component Value Date    WBC 13.87 (H) 01/02/2024    HGB 9.0 (L) 01/02/2024    HCT 27.9 (L) 01/02/2024    MCV 85.6 01/02/2024    PLT 75 (L) 01/02/2024    HEPBSAG Non-Reactive 12/31/2023     (H) 01/02/2024     (H) 01/02/2024    URICACID 4.4 12/31/2023       ASSESSMENT  PPD# 2 after vaginal delivery  Continues to have a headache.    PLAN  Await lab work from this AM.   Dr. Seals notified of patient desire for him to see her.           Marlene Zamarripa, APRN  10:33 EST  January 3, 2024

## 2024-01-03 NOTE — NURSING NOTE
Patient back from NICU with complaint of light headedness, dizziness and shortness of breath. See vital signs in charting.

## 2024-01-04 ENCOUNTER — APPOINTMENT (OUTPATIENT)
Dept: MRI IMAGING | Facility: HOSPITAL | Age: 29
End: 2024-01-04
Payer: COMMERCIAL

## 2024-01-04 VITALS
TEMPERATURE: 98.5 F | OXYGEN SATURATION: 99 % | HEART RATE: 78 BPM | HEIGHT: 67 IN | DIASTOLIC BLOOD PRESSURE: 95 MMHG | WEIGHT: 196 LBS | SYSTOLIC BLOOD PRESSURE: 146 MMHG | RESPIRATION RATE: 16 BRPM | BODY MASS INDEX: 30.76 KG/M2

## 2024-01-04 PROBLEM — O14.23 HELLP SYNDROME IN THIRD TRIMESTER: Status: RESOLVED | Noted: 2023-12-31 | Resolved: 2024-01-04

## 2024-01-04 PROBLEM — O14.93 PRE-ECLAMPSIA IN THIRD TRIMESTER: Status: RESOLVED | Noted: 2023-12-28 | Resolved: 2024-01-04

## 2024-01-04 LAB
ALBUMIN SERPL-MCNC: 3.7 G/DL (ref 3.5–5.2)
ALBUMIN/GLOB SERPL: 1.6 G/DL
ALP SERPL-CCNC: 127 U/L (ref 39–117)
ALT SERPL W P-5'-P-CCNC: 131 U/L (ref 1–33)
ANION GAP SERPL CALCULATED.3IONS-SCNC: 12 MMOL/L (ref 5–15)
AST SERPL-CCNC: 65 U/L (ref 1–32)
BILIRUB SERPL-MCNC: 0.3 MG/DL (ref 0–1.2)
BUN SERPL-MCNC: 9 MG/DL (ref 6–20)
BUN/CREAT SERPL: 13.8 (ref 7–25)
CALCIUM SPEC-SCNC: 8.6 MG/DL (ref 8.6–10.5)
CHLORIDE SERPL-SCNC: 105 MMOL/L (ref 98–107)
CO2 SERPL-SCNC: 22 MMOL/L (ref 22–29)
CREAT SERPL-MCNC: 0.65 MG/DL (ref 0.57–1)
DEPRECATED RDW RBC AUTO: 47.9 FL (ref 37–54)
EGFRCR SERPLBLD CKD-EPI 2021: 124.7 ML/MIN/1.73
ERYTHROCYTE [DISTWIDTH] IN BLOOD BY AUTOMATED COUNT: 15.1 % (ref 12.3–15.4)
GLOBULIN UR ELPH-MCNC: 2.3 GM/DL
GLUCOSE SERPL-MCNC: 92 MG/DL (ref 65–99)
HCT VFR BLD AUTO: 28.5 % (ref 34–46.6)
HGB BLD-MCNC: 9.5 G/DL (ref 12–15.9)
MCH RBC QN AUTO: 29 PG (ref 26.6–33)
MCHC RBC AUTO-ENTMCNC: 33.3 G/DL (ref 31.5–35.7)
MCV RBC AUTO: 86.9 FL (ref 79–97)
PLATELET # BLD AUTO: 123 10*3/MM3 (ref 140–450)
PMV BLD AUTO: 10.2 FL (ref 6–12)
POTASSIUM SERPL-SCNC: 4 MMOL/L (ref 3.5–5.2)
PROT SERPL-MCNC: 6 G/DL (ref 6–8.5)
RBC # BLD AUTO: 3.28 10*6/MM3 (ref 3.77–5.28)
SODIUM SERPL-SCNC: 139 MMOL/L (ref 136–145)
WBC NRBC COR # BLD AUTO: 13.85 10*3/MM3 (ref 3.4–10.8)

## 2024-01-04 PROCEDURE — 99232 SBSQ HOSP IP/OBS MODERATE 35: CPT | Performed by: NURSE PRACTITIONER

## 2024-01-04 PROCEDURE — 0503F POSTPARTUM CARE VISIT: CPT

## 2024-01-04 PROCEDURE — 80053 COMPREHEN METABOLIC PANEL: CPT | Performed by: OBSTETRICS & GYNECOLOGY

## 2024-01-04 PROCEDURE — 85027 COMPLETE CBC AUTOMATED: CPT | Performed by: OBSTETRICS & GYNECOLOGY

## 2024-01-04 PROCEDURE — 70544 MR ANGIOGRAPHY HEAD W/O DYE: CPT

## 2024-01-04 RX ORDER — LABETALOL 300 MG/1
300 TABLET, FILM COATED ORAL EVERY 12 HOURS SCHEDULED
Qty: 60 TABLET | Refills: 1 | Status: SHIPPED | OUTPATIENT
Start: 2024-01-04

## 2024-01-04 RX ORDER — PSEUDOEPHEDRINE HCL 30 MG
100 TABLET ORAL 2 TIMES DAILY
Qty: 60 CAPSULE | Refills: 0 | Status: SHIPPED | OUTPATIENT
Start: 2024-01-04

## 2024-01-04 RX ORDER — OXYCODONE HYDROCHLORIDE AND ACETAMINOPHEN 5; 325 MG/1; MG/1
1 TABLET ORAL EVERY 4 HOURS PRN
Qty: 10 TABLET | Refills: 0 | Status: SHIPPED | OUTPATIENT
Start: 2024-01-04 | End: 2024-01-09

## 2024-01-04 RX ORDER — LANOLIN ALCOHOL/MO/W.PET/CERES
400 CREAM (GRAM) TOPICAL DAILY
Qty: 30 TABLET | Refills: 5 | Status: SHIPPED | OUTPATIENT
Start: 2024-01-04

## 2024-01-04 RX ORDER — FERROUS SULFATE 325(65) MG
325 TABLET ORAL
Qty: 30 TABLET | Refills: 1 | Status: SHIPPED | OUTPATIENT
Start: 2024-01-05

## 2024-01-04 RX ORDER — FERROUS SULFATE 325(65) MG
325 TABLET ORAL
Status: DISCONTINUED | OUTPATIENT
Start: 2024-01-04 | End: 2024-01-04 | Stop reason: HOSPADM

## 2024-01-04 RX ORDER — IBUPROFEN 600 MG/1
600 TABLET ORAL EVERY 6 HOURS PRN
Qty: 60 TABLET | Refills: 0 | Status: SHIPPED | OUTPATIENT
Start: 2024-01-04

## 2024-01-04 RX ADMIN — MAGNESIUM OXIDE 400 MG (241.3 MG MAGNESIUM) TABLET 400 MG: TABLET at 09:10

## 2024-01-04 RX ADMIN — LABETALOL HYDROCHLORIDE 300 MG: 200 TABLET, FILM COATED ORAL at 09:00

## 2024-01-04 RX ADMIN — OXYCODONE HYDROCHLORIDE AND ACETAMINOPHEN 1 TABLET: 10; 325 TABLET ORAL at 09:09

## 2024-01-04 RX ADMIN — IBUPROFEN 600 MG: 600 TABLET, FILM COATED ORAL at 09:09

## 2024-01-04 RX ADMIN — Medication 1 TABLET: at 09:00

## 2024-01-04 RX ADMIN — IBUPROFEN 600 MG: 600 TABLET, FILM COATED ORAL at 02:44

## 2024-01-04 RX ADMIN — DOCUSATE SODIUM 100 MG: 100 CAPSULE, LIQUID FILLED ORAL at 09:00

## 2024-01-04 RX ADMIN — OXYCODONE HYDROCHLORIDE AND ACETAMINOPHEN 1 TABLET: 10; 325 TABLET ORAL at 02:44

## 2024-01-04 RX ADMIN — FERROUS SULFATE TAB 325 MG (65 MG ELEMENTAL FE) 325 MG: 325 (65 FE) TAB at 09:00

## 2024-01-04 NOTE — PROGRESS NOTES
"DOS: 2024  NAME: Sylvester Clarke   : 1995  PCP: Rusty Talley MD  Chief Complaint   Patient presents with    Elevated Blood Pressure     Patient presents to labor and delivery triage from the MD office for a blood pressure check and steriods     Neurology    Subjective: Reports h/a is improving, was 5 out of 10 this morning and came down to 3 out of 10 after percocet and advil. Hasn't tried compazine/benadryl. Pt seen in follow up today, however the problem is new to the examiner.      Objective:  Vital signs: /95 (BP Location: Left arm, Patient Position: Lying)   Pulse 78   Temp 98.5 °F (36.9 °C) (Oral)   Resp 16   Ht 170.2 cm (67\")   Wt 88.9 kg (196 lb)   LMP 2023   SpO2 99%   Breastfeeding Yes   BMI 30.70 kg/m²       General appearance: Well developed, well nourished, well groomed, alert and cooperative.   HEENT: Normocephalic. .    Cardiac: Regular rate and rhythm. No murmurs.   Chest Exam: Clear to auscultation bilaterally, no wheezes, no rhonchi.  Extremities: Normal, no edema.   Skin: No rashes or birthmarks.     Higher integrative function: Oriented to time, place, person, intact recent and remote memory, attention span, concentration and language. Spontaneous speech, fund of vocabulary are normal.   CN II: VF intact.   CN III IV VI: Extraocular movements are full without nystagmus. Pupils are equal, round, and reactive to light.   CN V: Normal facial sensation.  CN VII: Facial movements are symmetric, no weakness.   CN VIII: Auditory acuity is normal.   CN IX & X: Symmetric palatal movement.   CN XI: Sternocleidomastoid and trapezius are normal. No weakness.   CN XII: The tongue is midline.   Motor: Normal muscle strength, bulk, and tone in upper and lower extremities. No fasciculations, rigidity, spasticity or abnormal movements.   Sensation: Normal light touch in all extremities.   Station and gait: Not assessed.   Muscle stretch reflexes: Reflexes are normal " "and symmetric in the upper and lower extremities.   Plantar reflexes are flexor bilaterally.   Coordination: Finger to nose test showed no dysmetria. Heel to shin normal.     Scheduled Meds:docusate sodium, 100 mg, Oral, BID  ferrous sulfate, 325 mg, Oral, Daily With Breakfast  labetalol, 300 mg, Oral, Q12H  magnesium oxide, 400 mg, Oral, Daily  prenatal vitamin, 1 tablet, Oral, Daily      Continuous Infusions:lactated ringers, 75 mL/hr, Last Rate: Stopped (01/02/24 0233)      PRN Meds:.  all purpose nipple ointment    benzocaine    benzocaine-menthol    bisacodyl    butalbital-acetaminophen-caffeine    diphenhydrAMINE    diphenoxylate-atropine    hydrALAZINE **OR** labetalol **OR** [DISCONTINUED] NIFEdipine    pramoxine-hydrocortisone    Hydrocortisone (Perianal)    hydrOXYzine pamoate    ibuprofen    magnesium hydroxide    metoclopramide    ondansetron ODT **OR** ondansetron    oxyCODONE-acetaminophen **OR** oxyCODONE-acetaminophen    prochlorperazine    promethazine **OR** promethazine    sodium chloride    traMADol    Laboratory results:  Lab Results   Component Value Date    GLUCOSE 92 01/04/2024    CALCIUM 8.6 01/04/2024     01/04/2024    K 4.0 01/04/2024    CO2 22.0 01/04/2024     01/04/2024    BUN 9 01/04/2024    CREATININE 0.65 01/04/2024    BCR 13.8 01/04/2024    ANIONGAP 12.0 01/04/2024     Lab Results   Component Value Date    WBC 13.85 (H) 01/04/2024    HGB 9.5 (L) 01/04/2024    HCT 28.5 (L) 01/04/2024    MCV 86.9 01/04/2024     (L) 01/04/2024     No results found for: \"CHOL\"  No results found for: \"HDL\"  No results found for: \"LDL\"  No results found for: \"TRIG\"       Review and interpretation of imaging:  MRI Angiogram Venogram Head    Result Date: 1/4/2024  Patient: STANISLAW WHITEHEAD  Time Out: 06:50 Exam(s): MRV HEAD  EXAM:   MR Venography Head Without Intravenous Contrast CLINICAL HISTORY:    Reason for exam: Headache, uncomplicated (Ped 0-17y). Recommended following CT scan. " TECHNIQUE:   Magnetic resonance venography images of the head without intravenous contrast. COMPARISON: Comparison made to prior CT venogram from January 3, 2024. FINDINGS:   Superior sagittal sinus:  Unremarkable.  Patent.   Straight sinus:  Unremarkable.  Patent.   Transverse sinuses:  Unremarkable.  Patent.   Sigmoid sinuses:  Unremarkable.  Patent.   Internal jugular veins:  Unremarkable as visualized.   Internal cerebral and cortical veins:  Unremarkable as visualized. IMPRESSION:     Negative MRV of the brain.     Electronically signed by Charline Jimenez MD on 01-04-24 at 0650    CT venogram head    Addendum Date: 1/4/2024    The above findings were discussed with Nuris, the patient's nurse, at the time of this dictation.  This report was finalized on 1/4/2024 1:19 AM by Dr. Pool Snowden M.D on Workstation: [id number]      Result Date: 1/4/2024  CT VENOGRAM HEAD  HISTORY: Headaches prior preeclampsia, still having headaches, HELLP, intracranial venous injury concern  TECHNIQUE: CT scan of the head was performed without intravenous contrast. Subsequently, CT venogram of the head was obtained with 3 mm axial images without intravenous contrast.  Radiation dose reduction techniques were utilized, including automated exposure control and exposure modulation based on body size.  COMPARISON: None  FINDINGS: There is no finding of acute infarct, hemorrhage, contusion or abnormal extra-axial collection. No hydrocephalus is present.  The rostral aspect of the superior sagittal sinus appears hypoplastic with findings of thin contrast opacification along its more anterior aspects. There are prominent bilateral veins of coursing over the anterior aspect of the frontal lobe. No definite filling defect or findings of expansion of these areas of the superior sagittal sinus are seen. The remainder of the paranasal sinuses are well opacified.      1.  The rostral aspect of the superior sagittal sinus does not fill with  contrast without findings to suggest thrombosis. The bilateral frontal veins appear prominent. 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 2.  No noncontrast CT findings of acute intracranial abnormality.     This report was finalized on 1/3/2024 9:29 PM by Dr. Pool Snowden M.D on Workstation: [id number]       Impression:  28-year-old female with past medical history of migraine headache and hypertension who presented 12/28 with complaints of headache and was found to have elevated protein and blood pressures.  She was diagnosed with preeclampsia and HELLP syndrome.  She underwent induction for onset of labor and delivered her baby vaginally on 1/1.  Neurology was consulted by OB/GYN due to persistent headaches.    She describes a history of headache which she thinks are migraine headaches.  Typically they occur couple months a year and make her sick.  They do typically resolve with ibuprofen and Tylenol.  She was started on magnesium oxide and got Fioricet this admission with initial relief but then headache did recur.  She is also been taking Percocet and ibuprofen for labor discomfort and was also on magnesium sulfate for seizure prophylaxis for her eclampsia.    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  She does note some improvement with Advil and Percocet though she was advised that this can contribute to medication overuse headache/rebound headache.  She has been started on magnesium oxide for  prophylaxis.  MRV was normal/negative, MRI brain not felt necessary.  Discussed with Dr Campbell today. OK for d/c from neurology standpoint.  Patient states that she wants to go home.  She is agreeable to outpatient neurology follow-up for migraine, will arrange.  Will sign off please call if further questions or concerns.

## 2024-01-04 NOTE — PLAN OF CARE
Goal Outcome Evaluation:      VSS. BP still remains slightly elevated. Labetalol started today. Up ad pat. Continues to complain of  headache, however reports some relief with po pain medication. Benadryl and compazine ordered to help with headache. Requested dose from pharmacy as compazine is not stocked in med cart.  Awaiting CT report.

## 2024-01-04 NOTE — DISCHARGE SUMMARY
VAGINAL DELIVERY DISCHARGE SUMMARY      PATIENT: Sylvester Clarke        MR#:[id number]  LOCATION: [facility]  ADMISSION  DIAGNOSIS:    (normal spontaneous vaginal delivery)     DISCHARGE DIAGNOSIS:   1.  (normal spontaneous vaginal delivery)    2. Intractable headache, unspecified chronicity pattern, unspecified headache type    3. HELLP syndrome in third trimester          DATE OF ADMISSION: 2023  DATE OF DISCHARGE: 24     PROCEDURES:  Vaginal, Spontaneous     2024    2:33 AM      SERVICE: Obstetrics    HOSPITAL COURSE: Sylvester underwent vaginal delivery of a male infant and remained in the hospital for 4 days. During that time, Sylvester remained afebrile and hemodynamically stable. On the day of discharge, Sylvester was eating, ambulating and voiding without difficulty.      LABS:   Lab Results   Component Value Date    WBC 13.68 (H) 2024    HGB 8.9 (L) 2024    HCT 26.7 (L) 2024    MCV 87.3 2024    PLT 99 (L) 2024    POCGLU Normal 2023    CREATININE 0.67 2024    URICACID 4.4 2023    AST 61 (H) 2024     (H) 2024     (H) 2023     Results from last 7 days   Lab Units 24  2337   ABO TYPING  A   RH TYPING  Positive   ANTIBODY SCREEN  Negative       DISCHARGE MEDICATIONS     Discharge Medications        ASK your doctor about these medications        Instructions Start Date   diphenhydrAMINE 25 mg capsule  Commonly known as: BENADRYL   25 mg, Oral, Every 6 Hours PRN, Since  per OB/GYN. Taken QHS only.       hydrOXYzine 25 MG tablet  Commonly known as: ATARAX   25 mg, Oral, 3 Times Daily PRN      methylPREDNISolone 4 MG dose pack  Commonly known as: MEDROL   Take as directed on package instructions.      prenatal (CLASSIC) vitamin 28-0.8 MG tablet tablet  Generic drug: prenatal vitamin   Oral, Daily      promethazine 12.5 MG tablet  Commonly known as: PHENERGAN   12.5 mg, Oral, Every 6 Hours  PRN               DISCHARGE DISPOSITION: Home    DISCHARGE CONDITION: Stable    DISCHARGE DIET: Regular    ACTIVITY AT DISCHARGE: Pelvic rest    INFANT FEEDING PLANS: Breast    EDUCATION: Warning signs and symptoms given, no tub baths, nothing in the vagina for 6 weeks. Info given on taking mag for headache.     FOLLOW-UP APPOINTMENTS: [place] [facility] [place] for a blood pressure check, postpartum visit in 4-6 weeks.         Marlene Zamarripa CNM  01/04/24  09:36 EST     no loss of consciousness/no confusion/no weakness/no nausea/no vomiting/no blurred vision/no change in level of consciousness

## 2024-01-04 NOTE — PROGRESS NOTES
VAGINAL DELIVERY POSTPARTUM DAY 3    1/4/2024    MARTINA Phan is complaining of headache still.   Patient describes her lochia as less than menses.  Pain is well controlled from delivery standpoint.        OBJECTIVE   Temp: Temp:  [97.2 °F (36.2 °C)-98.9 °F (37.2 °C)] 97.9 °F (36.6 °C) Temp src: Oral   BP: BP: (127-160)/(78-97) 144/92        Pulse: Heart Rate:  [71-91] 78  RR: Resp:  [16-18] 18    General:  No acute distress   Abdomen: Fundus firm and beneath umbilicus   Pelvis: deferred     Lab Results   Component Value Date    WBC 13.68 (H) 01/03/2024    HGB 8.9 (L) 01/03/2024    HCT 26.7 (L) 01/03/2024    MCV 87.3 01/03/2024    PLT 99 (L) 01/03/2024    HEPBSAG Non-Reactive 12/31/2023    AST 61 (H) 01/03/2024     (H) 01/03/2024    URICACID 4.4 12/31/2023       ASSESSMENT  PPD# 3 after vaginal delivery  MRI of brain negative.    PLAN  Discharge to home  Percocet PRN for headache.   Follow up with [facility] in 6 weeks  Prescription for Motrin.  Advised no tampons, intercourse, or tub baths for 6 weeks.           Marlene Zamarripa CNM  06:44 EST  January 4, 2024

## 2024-01-04 NOTE — PLAN OF CARE
Goal Outcome Evaluation:      VVS, up ad pat, still has headache but controlled with motrin and percocet, went to MRI this morning, blood pressures have been below parameters, pt really wants to go home today.

## 2024-01-09 NOTE — PROGRESS NOTES
"Discharge Planning Assessment  [facility]     Patient Name: Sylvester Clarke  MRN: [id number]  Today's Date: 1/9/2024    Admit Date: 12/28/2023    Plan: Infant may discharge to mother when medically ready. CARA Jackson.   Discharge Needs Assessment    No documentation.                  Discharge Plan       Row Name 01/09/24 1316       Plan    Plan Infant may discharge to mother when medically ready. CARA Jackson.    Plan Comments Mother: Sylvester Clarke, MRN: [id number]; infant: Brian \"Jionna\" Vince, MRN: [id number]. CSW consulted for \"NICU admission.\" Of note, no toxicology screens were ordered for mother or infant as need was not warranted at this time. CSW met with mother and maternal sister in infant's NICU room. Mother gave consent for sister to be present during assessment. Mother verified address, phone number, and insurance. Mother reports she understands the process of adding infant to health insurance. Mother asked if infant would qualify for passport. CSW stated she was unsure and explained the role of the [employer] rep. Mother agreed to a [employer] consult. CSW consulted [employer]. Mother reports having a car seat, crib/bassinet, clothes, and diapers for infant. Mother reports she does not have preemie clothes and diapers for infant yet but has the financial ability to purchase them. This is mother and father's first baby. Mother reports maternal grandparents, maternal sister, and other family members are available for support as needed. Mother reports infant is following up with [facility] after discharge; mother is comfortable scheduling appointments for infant and has reliable transportation. Mother is not current with [facility] but is familiar with the program. Mother reports she does not qualify for [facility] due to making too much money. CSW explained her role as NICU  and encouraged mother to reach out if needed. Mother agreed. CSW provided mother with a packet " of resources including: WIC, HANDS, transportation, infant supplies, counseling, online support groups, postpartum mood and anxiety resources, NICU parent resources, and general community resources. CSW spent time building rapport with mother, and offered validation, support, and encouragement to mother throughout assessment. Mother was polite and appropriate, and denied having unmet needs or concerns at this time. CSW will remain available for psychosocial needs while infant is in the NICU. CARA Jackson.                  Continued Care and Services - Discharged on 1/4/2024 Admission date: 12/28/2023 - Discharge disposition: Home or Self Care   Coordination has not been started for this encounter.       Expected Discharge Date and Time       Expected Discharge Date Expected Discharge Time    Jan 4, 2024            Demographic Summary       Row Name 01/09/24 1316       General Information    Admission Type inpatient    Arrived From home    Referral Source nursing    Reason for Consult other (see comments)    General Information Comments NICU admission.                   Functional Status       Row Name 01/09/24 1316       Functional Status, IADL    Medications independent    Meal Preparation independent    Housekeeping independent    Laundry independent    Shopping independent       Mental Status    General Appearance WDL WDL       Mental Status Summary    Recent Changes in Mental Status/Cognitive Functioning no changes       Employment/    Employment Status employed full-time    Employment/ Comments [employer]                   Psychosocial       Row Name 01/09/24 1316       Behavior WDL    Behavior WDL WDL       Emotion Mood WDL    Emotion/Mood/Affect WDL WDL       Speech WDL    Speech WDL WDL       Perceptual State WDL    Perceptual State WDL WDL       Thought Process WDL    Thought Process WDL WDL       Intellectual Performance WDL    Intellectual Performance WDL WDL       Coping/Stress    Major  Change/Loss/Stressor birth    Patient Personal Strengths motivated;positive attitude;strong support system;future/goal oriented    Sources of Support other family members;parent(s);sibling(s)                   Abuse/Neglect       Row Name 01/09/24 1316       Personal Safety    Physical Signs of Abuse Present no                   Legal    No documentation.                  Substance Abuse    No documentation.                  Patient Forms    No documentation.                     HANDY Bradley

## 2024-01-10 ENCOUNTER — POSTPARTUM VISIT (OUTPATIENT)
Dept: OBSTETRICS AND GYNECOLOGY | Facility: CLINIC | Age: 29
End: 2024-01-10
Payer: COMMERCIAL

## 2024-01-10 VITALS
WEIGHT: 172 LBS | DIASTOLIC BLOOD PRESSURE: 88 MMHG | BODY MASS INDEX: 27 KG/M2 | SYSTOLIC BLOOD PRESSURE: 130 MMHG | HEIGHT: 67 IN

## 2024-01-10 DIAGNOSIS — Z87.59 HISTORY OF SEVERE PRE-ECLAMPSIA: Primary | ICD-10-CM

## 2024-01-10 DIAGNOSIS — R51.9 INTRACTABLE HEADACHE, UNSPECIFIED CHRONICITY PATTERN, UNSPECIFIED HEADACHE TYPE: ICD-10-CM

## 2024-01-10 PROCEDURE — 0503F POSTPARTUM CARE VISIT: CPT | Performed by: OBSTETRICS & GYNECOLOGY

## 2024-01-10 NOTE — PROGRESS NOTES
Subjective   Sylvester Clarke is a 28 y.o. female.     Cc:  Follow up to blood pressure    History of Present Illness - Patient is a 28 year old female 10 days postpartum from delivery at 34 weeks for severe pre-eclampsia.  She had a vaginal birth, complicated by HELLP syndrome and postpartum hemorrhage.  She continued to have hypertension postpartum and is on daily Labetalol.  She continues to have a moderate headache, refractory to treatment.  Her infant is in NICU but making excellent progress.    The following portions of the patient's history were reviewed and updated as appropriate: She  has a past medical history of Chlamydia (), COVID-19 (2023), Gestational hypertension, and History of  delivery (2024).  She  reports that she has never smoked. She has never been exposed to tobacco smoke. She does not have any smokeless tobacco history on file. She reports that she does not currently use alcohol. She reports that she does not use drugs.  Current Outpatient Medications   Medication Sig Dispense Refill    docusate sodium 100 MG capsule Take 1 capsule by mouth 2 (Two) Times a Day. 60 capsule 0    ferrous sulfate 325 (65 FE) MG tablet Take 1 tablet by mouth Daily With Breakfast. 30 tablet 1    ibuprofen (ADVIL,MOTRIN) 600 MG tablet Take 1 tablet by mouth Every 6 (Six) Hours As Needed for Mild Pain 60 tablet 0    labetalol (NORMODYNE) 300 MG tablet Take 1 tablet by mouth Every 12 (Twelve) Hours. Hold for top blood pressure less than 100, bottom blood pressure less than 60, or heart rate less than 50 60 tablet 1    Magnesium Oxide -Mg Supplement 400 (240 Mg) MG tablet Take 1 tablet by mouth Daily. 30 tablet 5    prenatal vitamin (prenatal, CLASSIC, vitamin) tablet Take  by mouth Daily.       No current facility-administered medications for this visit.     She has No Known Allergies..    Review of Systems   Respiratory:  Positive for shortness of breath.    Cardiovascular:  Positive for  palpitations. Negative for chest pain.   Neurological:  Positive for headaches.       Objective   Physical Exam  Vitals reviewed.   Constitutional:       Appearance: Normal appearance.   Neurological:      General: No focal deficit present.      Mental Status: She is alert.      Coordination: Coordination normal.   Psychiatric:         Mood and Affect: Mood normal.         Behavior: Behavior normal.         Thought Content: Thought content normal.         Judgment: Judgment normal.         Assessment & Plan   Diagnoses and all orders for this visit:    1. History of severe pre-eclampsia (Primary)        -      Blood pressure is in normotensive range.  Recommend continue Labetalol.  OK to hold 2 to 3 days prior to next visit.  Discussed management of future pregnancy.  Patient considering IUD for contraception and would recommend placement under ultrasound.  2. Intractable headache, unspecified chronicity pattern, unspecified headache type  -     Ambulatory Referral to Neurology  -     Referral due to persistent headache.    Rusty Seals MD

## 2024-01-12 ENCOUNTER — MATERNAL SCREENING (OUTPATIENT)
Dept: CALL CENTER | Facility: HOSPITAL | Age: 29
End: 2024-01-12
Payer: COMMERCIAL

## 2024-01-12 NOTE — OUTREACH NOTE
Maternal Screening Survey      Flowsheet Row Responses   Facility patient discharged from? [place]   Attempt successful? Yes   Call start time 104   Call end time 1050   Person spoke with today (if not patient) and relationship patient   EPD Scale: Able to Laugh 0-->as much as she always could   EPD Scale: Looked Forward 0-->as much as she ever did   EPD Scale: Blamed Self 0-->no, never   EPD Scale: Been Anxious 0-->no, not at all   EPD Scale: Felt Panicky 0-->no, not at all   EPD Scale: Things Getting on Top 0-->no, has been coping as well as ever   EPD Scale: Difficulty Sleeping 0-->no, not at all   EPD Scale: Sad or Miserable 0-->no, not at all   EPD Scale: Crying 0-->no, never   EPD Scale: Thought of Harming Self 0-->never   [place]  Depression Score 0   Did any of your parents have problems with alcohol or drug use? No   Do any of your peers have problems with alcohol or drug use? No   Does your partner have problems with alcohol or drug use? No   Before you were pregnant did you have problems with alcohol or drug use? (past) No   In the past month, did you drink beer, wine, liquor or use any other drugs? (pregnancy) No   Maternal Screening call completed Yes   Wrap up additional comments Patient is doing well. baby is in [place] but doing very well and possibly coming home next week.   Call end time 1050              Moise COOPER - Registered Nurse

## 2024-01-17 ENCOUNTER — TELEPHONE (OUTPATIENT)
Dept: OBSTETRICS AND GYNECOLOGY | Facility: CLINIC | Age: 29
End: 2024-01-17
Payer: COMMERCIAL

## 2024-01-17 NOTE — TELEPHONE ENCOUNTER
Caller: Sylvester Clarke    Relationship: Self    Best call back number: [phone]    What form or medical record are you requesting: CONFIRMATION OF DELIVERY    Who is requesting this form or medical record from you: EMPLOYER    How would you like to receive the form or medical records (pick-up, mail, fax): FAX  If fax, what is the fax number: [phone]    Timeframe paperwork needed: ASAP    Additional notes: PATIENT EMPLOYER ASKING FOR CONFIRMATION OF DELIVERY LETTER FAX TO OFFICE.

## 2024-02-05 ENCOUNTER — POSTPARTUM VISIT (OUTPATIENT)
Dept: OBSTETRICS AND GYNECOLOGY | Facility: CLINIC | Age: 29
End: 2024-02-05
Payer: COMMERCIAL

## 2024-02-05 VITALS
DIASTOLIC BLOOD PRESSURE: 112 MMHG | SYSTOLIC BLOOD PRESSURE: 165 MMHG | HEIGHT: 67 IN | BODY MASS INDEX: 25.58 KG/M2 | WEIGHT: 163 LBS

## 2024-02-05 PROCEDURE — 0503F POSTPARTUM CARE VISIT: CPT | Performed by: OBSTETRICS & GYNECOLOGY

## 2024-02-05 NOTE — PROGRESS NOTES
"Subjective   Sylvester Clarke is a 28 y.o. female who presents for a postpartum visit. She is 6 weeks postpartum following a spontaneous vaginal delivery. I have fully reviewed the prenatal and intrapartum course - pregnancy was complicated by severe pre-eclampsia and postpartum hemorrhage during PPROM birth. The delivery was at 34 gestational weeks. Outcome: spontaneous vaginal delivery. Anesthesia: epidural. Postpartum course has been complicated by hypertension management.  Blood pressures were well controlled on medication and patient recently \"held\" medication. Baby's course has been complicated by prolonged NICU stay due to prematurity. Baby is feeding by breast. Bleeding  is scant . Bowel function is normal. Bladder function is normal. Patient is not sexually active. Contraception method is  desired as IUD . Postpartum depression screening: negative.    The following portions of the patient's history were reviewed and updated as appropriate: She  has a past medical history of Chlamydia (), COVID-19 (2023), Gestational hypertension, and History of  delivery (2024).  She  reports that she has never smoked. She has never been exposed to tobacco smoke. She does not have any smokeless tobacco history on file. She reports that she does not currently use alcohol. She reports that she does not use drugs.  Current Outpatient Medications   Medication Sig Dispense Refill    docusate sodium 100 MG capsule Take 1 capsule by mouth 2 (Two) Times a Day. 60 capsule 0    ferrous sulfate 325 (65 FE) MG tablet Take 1 tablet by mouth Daily With Breakfast. 30 tablet 1    labetalol (NORMODYNE) 300 MG tablet Take 1 tablet by mouth Every 12 (Twelve) Hours. Hold for top blood pressure less than 100, bottom blood pressure less than 60, or heart rate less than 50 60 tablet 1    Levonorgestrel (Mirena, 52 MG,) 20 MCG/DAY intrauterine device IUD To be inserted one time by prescriber. Route intrauterine. 1 each 0    " "prenatal vitamin (prenatal, CLASSIC, vitamin) tablet Take  by mouth Daily.      ibuprofen (ADVIL,MOTRIN) 600 MG tablet Take 1 tablet by mouth Every 6 (Six) Hours As Needed for Mild Pain (Patient not taking: Reported on 2/5/2024) 60 tablet 0    Magnesium Oxide -Mg Supplement 400 (240 Mg) MG tablet Take 1 tablet by mouth Daily. (Patient not taking: Reported on 2/5/2024) 30 tablet 5     No current facility-administered medications for this visit.     She has No Known Allergies..    Review of Systems  Constitutional: negative for chills and fevers  Gastrointestinal: negative for nausea and vomiting  Genitourinary:negative for dysuria and frequency  Integument/breast: negative for breast lump and breast tenderness  Behavioral/Psych: negative for anxiety and depression    Objective   BP (!) 165/112   Ht 170.2 cm (67.01\")   Wt 73.9 kg (163 lb)   LMP  (Exact Date)   Breastfeeding Yes   BMI 25.52 kg/m²    General:  alert, appears stated age, and cooperative    Breasts:  inspection negative, no nipple discharge or bleeding, no masses or nodularity palpable   Lungs: clear to auscultation bilaterally   Heart:  regular rate and rhythm   Abdomen: normal findings: soft, non-tender    Vulva:  negative for swelling bilaterally   Vagina: normal vagina   Cervix:  no bleeding following Pap and no cervical motion tenderness   Corpus: normal size, contour, position, consistency, mobility, non-tender   Adnexa:  no mass, fullness, tenderness   Rectal Exam: Not performed.     Assessment & Plan   Normal postpartum exam. Pap smear done at today's visit.    1. Contraception: IUD  2. Resume normal activities  3. Follow up in: 3 weeks or as needed.    Rusty Seals MD           "

## 2024-02-08 LAB
CONV .: NORMAL
CYTOLOGIST CVX/VAG CYTO: NORMAL
CYTOLOGY CVX/VAG DOC CYTO: NORMAL
CYTOLOGY CVX/VAG DOC THIN PREP: NORMAL
DX ICD CODE: NORMAL
HIV 1 & 2 AB SER-IMP: NORMAL
OTHER STN SPEC: NORMAL
STAT OF ADQ CVX/VAG CYTO-IMP: NORMAL

## 2024-02-13 ENCOUNTER — OFFICE VISIT (OUTPATIENT)
Dept: FAMILY MEDICINE CLINIC | Facility: CLINIC | Age: 29
End: 2024-02-13
Payer: COMMERCIAL

## 2024-02-13 VITALS
HEIGHT: 67 IN | OXYGEN SATURATION: 98 % | WEIGHT: 165.8 LBS | RESPIRATION RATE: 16 BRPM | BODY MASS INDEX: 26.02 KG/M2 | SYSTOLIC BLOOD PRESSURE: 132 MMHG | TEMPERATURE: 97 F | DIASTOLIC BLOOD PRESSURE: 92 MMHG | HEART RATE: 89 BPM

## 2024-02-13 DIAGNOSIS — I10 PRIMARY HYPERTENSION: Primary | ICD-10-CM

## 2024-02-13 DIAGNOSIS — G43.019 INTRACTABLE MIGRAINE WITHOUT AURA AND WITHOUT STATUS MIGRAINOSUS: ICD-10-CM

## 2024-02-13 DIAGNOSIS — D50.9 IRON DEFICIENCY ANEMIA, UNSPECIFIED IRON DEFICIENCY ANEMIA TYPE: ICD-10-CM

## 2024-02-13 RX ORDER — LABETALOL 300 MG/1
300 TABLET, FILM COATED ORAL EVERY 12 HOURS SCHEDULED
Qty: 60 TABLET | Refills: 1 | Status: SHIPPED | OUTPATIENT
Start: 2024-02-13

## 2024-02-14 LAB
ALBUMIN SERPL-MCNC: 4.5 G/DL (ref 3.5–5.2)
ALBUMIN/GLOB SERPL: 1.7 G/DL
ALP SERPL-CCNC: 103 U/L (ref 39–117)
ALT SERPL-CCNC: 24 U/L (ref 1–33)
AST SERPL-CCNC: 29 U/L (ref 1–32)
BASOPHILS # BLD AUTO: NORMAL 10*3/UL
BILIRUB SERPL-MCNC: 0.6 MG/DL (ref 0–1.2)
BUN SERPL-MCNC: 6 MG/DL (ref 6–20)
BUN/CREAT SERPL: 6.7 (ref 7–25)
CALCIUM SERPL-MCNC: 9.7 MG/DL (ref 8.6–10.5)
CHLORIDE SERPL-SCNC: 103 MMOL/L (ref 98–107)
CHOLEST SERPL-MCNC: 205 MG/DL (ref 0–200)
CO2 SERPL-SCNC: 24.6 MMOL/L (ref 22–29)
CREAT SERPL-MCNC: 0.89 MG/DL (ref 0.57–1)
DIFFERENTIAL COMMENT: NORMAL
EGFRCR SERPLBLD CKD-EPI 2021: 91.8 ML/MIN/1.73
EOSINOPHIL # BLD AUTO: NORMAL 10*3/UL
EOSINOPHIL # BLD MANUAL: 0.11 10*3/MM3 (ref 0–0.4)
EOSINOPHIL NFR BLD AUTO: NORMAL %
EOSINOPHIL NFR BLD MANUAL: 2.1 % (ref 0.3–6.2)
ERYTHROCYTE [DISTWIDTH] IN BLOOD BY AUTOMATED COUNT: 13.1 % (ref 12.3–15.4)
GLOBULIN SER CALC-MCNC: 2.7 GM/DL
GLUCOSE SERPL-MCNC: 83 MG/DL (ref 65–99)
HCT VFR BLD AUTO: 37.9 % (ref 34–46.6)
HDLC SERPL-MCNC: 85 MG/DL (ref 40–60)
HGB BLD-MCNC: 12.5 G/DL (ref 12–15.9)
LDLC SERPL CALC-MCNC: 113 MG/DL (ref 0–100)
LYMPHOCYTES # BLD AUTO: NORMAL 10*3/UL
LYMPHOCYTES # BLD MANUAL: 1.08 10*3/MM3 (ref 0.7–3.1)
LYMPHOCYTES NFR BLD AUTO: NORMAL %
LYMPHOCYTES NFR BLD MANUAL: 21.1 % (ref 19.6–45.3)
MCH RBC QN AUTO: 27.9 PG (ref 26.6–33)
MCHC RBC AUTO-ENTMCNC: 33 G/DL (ref 31.5–35.7)
MCV RBC AUTO: 84.6 FL (ref 79–97)
MONOCYTES # BLD MANUAL: 0.27 10*3/MM3 (ref 0.1–0.9)
MONOCYTES NFR BLD AUTO: NORMAL %
MONOCYTES NFR BLD MANUAL: 5.3 % (ref 5–12)
NEUTROPHILS # BLD MANUAL: 3.67 10*3/MM3 (ref 1.7–7)
NEUTROPHILS NFR BLD AUTO: NORMAL %
NEUTROPHILS NFR BLD MANUAL: 71.6 % (ref 42.7–76)
PLATELET # BLD AUTO: 226 10*3/MM3 (ref 140–450)
PLATELET BLD QL SMEAR: NORMAL
POTASSIUM SERPL-SCNC: 3.4 MMOL/L (ref 3.5–5.2)
PROT SERPL-MCNC: 7.2 G/DL (ref 6–8.5)
RBC # BLD AUTO: 4.48 10*6/MM3 (ref 3.77–5.28)
RBC MORPH BLD: NORMAL
SODIUM SERPL-SCNC: 140 MMOL/L (ref 136–145)
T4 FREE SERPL-MCNC: 1.17 NG/DL (ref 0.93–1.7)
TRIGL SERPL-MCNC: 38 MG/DL (ref 0–150)
TSH SERPL DL<=0.005 MIU/L-ACNC: 1.02 UIU/ML (ref 0.27–4.2)
VLDLC SERPL CALC-MCNC: 7 MG/DL (ref 5–40)
WBC # BLD AUTO: 5.12 10*3/MM3 (ref 3.4–10.8)

## 2024-02-16 ENCOUNTER — PATIENT ROUNDING (BHMG ONLY) (OUTPATIENT)
Dept: FAMILY MEDICINE CLINIC | Facility: CLINIC | Age: 29
End: 2024-02-16
Payer: COMMERCIAL

## 2024-02-29 ENCOUNTER — PROCEDURE VISIT (OUTPATIENT)
Dept: OBSTETRICS AND GYNECOLOGY | Facility: CLINIC | Age: 29
End: 2024-02-29
Payer: COMMERCIAL

## 2024-02-29 VITALS
BODY MASS INDEX: 25.74 KG/M2 | HEIGHT: 67 IN | DIASTOLIC BLOOD PRESSURE: 107 MMHG | WEIGHT: 164 LBS | SYSTOLIC BLOOD PRESSURE: 144 MMHG

## 2024-02-29 DIAGNOSIS — I10 ESSENTIAL HYPERTENSION: ICD-10-CM

## 2024-02-29 DIAGNOSIS — Z30.430 ENCOUNTER FOR IUD INSERTION: Primary | ICD-10-CM

## 2024-02-29 LAB
B-HCG UR QL: NEGATIVE
EXPIRATION DATE: NORMAL
INTERNAL NEGATIVE CONTROL: NEGATIVE
INTERNAL POSITIVE CONTROL: POSITIVE
Lab: NORMAL

## 2024-02-29 PROCEDURE — 58300 INSERT INTRAUTERINE DEVICE: CPT | Performed by: OBSTETRICS & GYNECOLOGY

## 2024-02-29 PROCEDURE — 81025 URINE PREGNANCY TEST: CPT | Performed by: OBSTETRICS & GYNECOLOGY

## 2024-02-29 PROCEDURE — 99212 OFFICE O/P EST SF 10 MIN: CPT | Performed by: OBSTETRICS & GYNECOLOGY

## 2024-03-01 ENCOUNTER — SPECIALTY PHARMACY (OUTPATIENT)
Dept: NEUROLOGY | Facility: CLINIC | Age: 29
End: 2024-03-01
Payer: COMMERCIAL

## 2024-03-01 ENCOUNTER — OFFICE VISIT (OUTPATIENT)
Dept: NEUROLOGY | Facility: CLINIC | Age: 29
End: 2024-03-01
Payer: COMMERCIAL

## 2024-03-01 VITALS
HEART RATE: 88 BPM | SYSTOLIC BLOOD PRESSURE: 148 MMHG | HEIGHT: 67 IN | WEIGHT: 164 LBS | DIASTOLIC BLOOD PRESSURE: 78 MMHG | BODY MASS INDEX: 25.74 KG/M2

## 2024-03-01 DIAGNOSIS — G43.019 INTRACTABLE MIGRAINE WITHOUT AURA AND WITHOUT STATUS MIGRAINOSUS: Primary | ICD-10-CM

## 2024-03-01 RX ORDER — RIMEGEPANT SULFATE 75 MG/75MG
75 TABLET, ORALLY DISINTEGRATING ORAL EVERY OTHER DAY
Qty: 16 TABLET | Refills: 2 | Status: SHIPPED | OUTPATIENT
Start: 2024-03-01

## 2024-03-01 NOTE — ASSESSMENT & PLAN NOTE
28 year old left handed woman who is currently breast feeding with migraines without aura.  She reports a history of headaches starting 9-10 years ago.  Her headaches start on the right side and moves to involve her entire head which she describes as a dull pain which she rates as 8-9/10 on pain scale 1-10 when most severe with associated light and sound sensitivity along with nausea without vomiting.  The headaches can last up to 3-4 days in duration.  She has a constant daily headache which can get worse.  She delivered her healthy baby 2 months ago.  She did have preeclampsia with the pregnancy and the headache at that time may have been related to the preeclampsia.  Of note she had brain MRI/MRV scan in 2023 which I reviewed the images independently on her visit today and does not demonstrate any acute intracranial abnormalities.  No known family history of migraines.  She is currently on labetalol which they have increased the dosing most recently.  She has an IUD.  She has tired ibuprofen, Tylenol and magnesium oxide which is all she has tried.  She is currently breastfeeding.  She has not really noticed any triggers for her headaches.  She denies any auras with her migraines.  She does not smoke.  I spoke with her about treatment of her migraines with combination of preventative and acute medicines along with lifestyle modifications and triggers to avoid.  Since she is breast feeding I will start her on Nurtec ODT which appears to be safe with breastfeeding as the molecule is too large to get into breast milk at any concerning amount.  She can use this every other day for prevention of her migraines and possibly acute treatment.  Provided patient education information on migraine triggers and lifestyle modification today.

## 2024-03-01 NOTE — PROGRESS NOTES
Chief Complaint  Migraine (Pt reports having for a while, 2023- imaging complete- HA hasn't been able to get rid ibuprofen no help- Pt is breastfeeding . )    Subjective          Sylvester Clarke presents to [facility]UR[facility] for   HISTORY OF PRESENT ILLNESS:    Sylvester Clarke is a 28 year old left handed woman who presents to neurology clinic for initial evaluation and treatment of migraines.  She reports a history of headaches starting 9-10 years ago.  Her headaches start on the right side and moves to involve her entire head which she describes as a dull pain which she rates as 8-9/10 on pain scale 1-10 when most severe with associated light and sound sensitivity along with nausea without vomiting.  The headaches can last up to 3-4 days in duration.  She has a constant daily headache which can get worse.  She delivered her healthy baby 2 months ago.  She did have preeclampsia with the pregnancy and the headache at that time may have been related to the preeclampsia.  Of note she had brain MRI/MRV scan in  which I reviewed the images independently on her visit today and does not demonstrate any acute intracranial abnormalities.  No known family history of migraines.  She is currently on labetalol which they have increased the dosing most recently.  She has an IUD.  She has tired ibuprofen, Tylenol and magnesium oxide which is all she has tried.  She is currently breastfeeding.  She has not really noticed any triggers for her headaches.  She denies any auras with her migraines.  She does not smoke.      Past Medical History:   Diagnosis Date    Chlamydia 2016    COVID-19 2023    2022 10/2021-Vaxxed and Boosted    Gestational hypertension     Headache     History of  delivery 2024    @34 weeks        Family History   Problem Relation Age of Onset    Other Father         Blood clots    Diabetes Sister     Diabetes Maternal Grandmother     Diabetes  "Maternal Grandfather     Thyroid cancer Paternal Grandmother         Social History     Socioeconomic History    Marital status: Single   Tobacco Use    Smoking status: Never     Passive exposure: Never   Vaping Use    Vaping Use: Never used   Substance and Sexual Activity    Alcohol use: Not Currently    Drug use: Never    Sexual activity: Not Currently     Partners: Male     Birth control/protection: Abstinence        I have reviewed and confirmed the accuracy of the ROS as documented by the MA/LPN/RN Wilfrido Gomez MD   Review of Systems   Constitutional:  Negative for activity change and appetite change.   HENT:  Negative for trouble swallowing and voice change.    Eyes:  Positive for pain (HA) and visual disturbance. Negative for blurred vision and double vision.   Gastrointestinal:  Positive for nausea. Negative for vomiting.   Musculoskeletal:  Negative for back pain and gait problem.   Allergic/Immunologic: Positive for environmental allergies. Negative for food allergies.   Neurological:  Positive for light-headedness and headache. Negative for dizziness, tremors, seizures, syncope, facial asymmetry, speech difficulty, weakness, numbness, memory problem and confusion.   Psychiatric/Behavioral:  Positive for agitation and sleep disturbance. Negative for behavioral problems, decreased concentration, dysphoric mood, hallucinations, self-injury, suicidal ideas, negative for hyperactivity, depressed mood and stress. The patient is nervous/anxious.         Objective   Vital Signs:   /78   Pulse 88   Ht 170.2 cm (67.01\")   Wt 74.4 kg (164 lb)   BMI 25.68 kg/m²       PHYSICAL EXAM:    General   Mental Status - Alert. General Appearance - Well developed, Well groomed, Oriented and Cooperative. Orientation - Oriented X3.       Head and Neck  Head - normocephalic, atraumatic with no lesions or palpable masses.  Neck    Global Assessment - supple.       Eye   Sclera/Conjunctiva - Bilateral - " Normal.    ENMT  Mouth and Throat   Oral Cavity/Oropharynx: Oropharynx - the soft palate,uvula and tongue are normal in appearance.    Chest and Lung Exam   Chest - lung clear to auscultation bilaterally.    Cardiovascular   Cardiovascular examination reveals  - normal heart sounds, regular rate and rhythm.    Neurologic   Mental Status: Speech - Normal. Cognitive function - appropriate fund of knowledge. No impairment of attention, Impairment of concentration, impairment of long term memory or impairment of short term memory.  Cranial Nerves:   II Optic: Visual acuity - Left - Normal. Right - Normal. Visual fields - Normal (to confrontation).  III Oculomotor: Pupillary constriction - Left - Normal. Right - Normal.  VII Facial: - Normal Bilaterally.   IX Glossopharyngeal / X Vagus - Normal.  XI Accessory: Trapezius - Bilateral - Normal. Sternocleidomastoid - Bilateral - Normal.  XII Hypoglossal - Bilateral - Normal.  Eye Movements: - Normal Bilaterally.  Sensory:   Light Touch: Intact - Globally.  Motor:   Bulk and Contour: - Normal.  Tone: - Normal.  Tremor: Not present.  Strength: 5/5 normal muscle strength - All Muscles.   General Assessment of Reflexes: - deep tendon reflexes are normal. Coordination - No Impairment of finger-to-nose or Impairment of rapid alternating movements. Gait - Normal.       Result Review :                 Assessment and Plan    Problem List Items Addressed This Visit          Neuro    Intractable migraine without aura and without status migrainosus - Primary    Current Assessment & Plan     28 year old left handed woman who is currently breast feeding with migraines without aura.  She reports a history of headaches starting 9-10 years ago.  Her headaches start on the right side and moves to involve her entire head which she describes as a dull pain which she rates as 8-9/10 on pain scale 1-10 when most severe with associated light and sound sensitivity along with nausea without  vomiting.  The headaches can last up to 3-4 days in duration.  She has a constant daily headache which can get worse.  She delivered her healthy baby 2 months ago.  She did have preeclampsia with the pregnancy and the headache at that time may have been related to the preeclampsia.  Of note she had brain MRI/MRV scan in 2023 which I reviewed the images independently on her visit today and does not demonstrate any acute intracranial abnormalities.  No known family history of migraines.  She is currently on labetalol which they have increased the dosing most recently.  She has an IUD.  She has tired ibuprofen, Tylenol and magnesium oxide which is all she has tried.  She is currently breastfeeding.  She has not really noticed any triggers for her headaches.  She denies any auras with her migraines.  She does not smoke.  I spoke with her about treatment of her migraines with combination of preventative and acute medicines along with lifestyle modifications and triggers to avoid.  Since she is breast feeding I will start her on Nurtec ODT which appears to be safe with breastfeeding as the molecule is too large to get into breast milk at any concerning amount.  She can use this every other day for prevention of her migraines and possibly acute treatment.  Provided patient education information on migraine triggers and lifestyle modification today.           Relevant Medications    Rimegepant Sulfate (Nurtec) 75 MG tablet dispersible tablet       I spent 46 minutes of time caring for Sylvester on this date of service. This time includes time spent by me in the following activities:preparing for the visit, reviewing tests, obtaining and/or reviewing a separately obtained history, performing a medically appropriate examination and/or evaluation , counseling and educating the patient/family/caregiver, ordering medications, tests, or procedures, documenting information in the medical record, independently interpreting results  and communicating that information with the patient/family/caregiver, and care coordination    Follow Up   Return in about 3 months (around 6/1/2024).  Patient was given instructions and counseling regarding her condition or for health maintenance advice. Please see specific information pulled into the AVS if appropriate.

## 2024-03-04 NOTE — PROGRESS NOTES
Subjective   Sylvester Clarke is a 28 y.o. female.     Cc:  IUD insertion, high blood pressure    History of Present Illness - Patient is a 28 year old female parous times one who presents for IUD insertion and blood pressure check.  She reports generally good control of BP at home.  She is on Labetalol.  She has mild headaches and shortness of air; she is going to see a neurologist tomorrow.  She has previously had IUDs but they were unsuccessful due to malpositioning.  She is here to have this placed under ultrasound guidance.    The following portions of the patient's history were reviewed and updated as appropriate: She  has a past medical history of Chlamydia (), COVID-19 (2023), Gestational hypertension, Headache, and History of  delivery (2024).  She  reports that she has never smoked. She has never been exposed to tobacco smoke. She does not have any smokeless tobacco history on file. She reports that she does not currently use alcohol. She reports that she does not use drugs.  Current Outpatient Medications   Medication Sig Dispense Refill    labetalol (NORMODYNE) 300 MG tablet Take 1 tablet by mouth Every 12 (Twelve) Hours. Hold for top blood pressure less than 100, bottom blood pressure less than 60, or heart rate less than 50 60 tablet 1    Levonorgestrel (Mirena, 52 MG,) 20 MCG/DAY intrauterine device IUD To be inserted one time by prescriber. Route intrauterine. 1 each 0    prenatal vitamin (prenatal, CLASSIC, vitamin) tablet Take  by mouth Daily.      Rimegepant Sulfate (Nurtec) 75 MG tablet dispersible tablet Take 1 tablet by mouth Every Other Day. 16 tablet 2     No current facility-administered medications for this visit.     She has No Known Allergies..    Review of Systems   Constitutional:  Negative for chills and fever.   Genitourinary:  Negative for vaginal bleeding.       Objective   Physical Exam  Vitals reviewed. Exam conducted with a chaperone present.    Constitutional:       Appearance: Normal appearance.   Genitourinary:     General: Normal vulva.      Exam position: Lithotomy position.      Labia:         Right: No rash or tenderness.         Left: No rash or tenderness.       Vagina: Normal.      Cervix: Normal.   Neurological:      Mental Status: She is alert.   Psychiatric:         Mood and Affect: Mood normal.         Behavior: Behavior normal.         Thought Content: Thought content normal.         Judgment: Judgment normal.     Procedures  IUD Insertion Procedure Note    Indication: Desires long acting reversible contraception     Procedure Details   Urine pregnancy test was done and was NEGATIVE .  The risks (including infection, bleeding, pain, and uterine perforation) and benefits of the procedure were explained to the patient and Verbal informed consent was obtained.  Regular time out process occurred.    Cervix cleansed with Betadine. Uterus sounded to 7 cm. Ultrasound used for guidance.  IUD inserted without difficulty. String visible and trimmed.  Ultrasound confirmed placement.    IUD Information:  Mirena, Lot # [id number], Expiration date 4/2026, NDC 95985-786-30.    Condition:  Stable    Complications:  None    Patient tolerated the procedure well without complications.      Rusty Seals MD  3/4/2024  13:29 EST      Assessment & Plan   Diagnoses and all orders for this visit:    1. Encounter for IUD insertion (Primary)  -     POC Pregnancy, Urine  -     Levonorgestrel (MIRENA) 20 MCG/DAY IUD intrauterine device 1 each  -     Patient to take NSAIDS for cramping/prevention of expulsion.  Follow up in 1 month.  No intercourse for one week.    2. Essential hypertension         -     Patient to see neurologist and see PCP for evaluation of HTN and headaches.    Rusty Seals MD

## 2024-03-05 ENCOUNTER — TELEPHONE (OUTPATIENT)
Dept: NEUROLOGY | Facility: CLINIC | Age: 29
End: 2024-03-05
Payer: COMMERCIAL

## 2024-03-05 ENCOUNTER — SPECIALTY PHARMACY (OUTPATIENT)
Dept: NEUROLOGY | Facility: CLINIC | Age: 29
End: 2024-03-05
Payer: COMMERCIAL

## 2024-03-08 NOTE — NURSING NOTE
Phoned Dr Paredes. Informed of last two pt bp and requested iv access.  Ordered to insert iv and perform type and screen.    Subjective:     Patient ID: Gerson Phillips III is a 71 y.o. male    Chief Complaint: Follow-up (MRI results)      Referred by: No ref. provider found      HPI:    Interval History PA (03/08/2024):  Patient returns to clinic for follow up of midline/bilateral lower back pain and MRI review.  Patient denies any changes in the quality or location of his pain.  Continues to note pain located in his midline lower lumbar spine radiating to his bilateral lumbar paraspinal region, worse on the right.  He does note some pain radiating from his midline lower lumbar spine to his right lateral hip, denies any radiation distal this point.  Denies any numbness, tingling, weakness, bowel or bladder dysfunction.  Notes pain is constant, achy, worsened with certain activities.  Also worsened in the morning when getting out of bed with associated stiffness.    Interval History (2/23/24):  He returns today for follow up.  He reports that he continues to have midline/bilateral low back pain.  Pain is focused in the midline lower lumbar region.  Will radiate somewhat to the bilateral lumbar paraspinal regions but is worse on the right side.  Pain will also extend to the right lateral hip.  Pain does not extend distally to this.  Pain is constant and worsened with activity.  He did attend outpatient physical therapy.  He attended roughly 10 sessions without patient physical therapy and has continued a regular home exercise program.  He does not find this has improved his pain and may have even worsened it.  Overall he denies any significant changes in the quality or location was pain recently.  Denies any new symptoms.        Interval History PA (08/25/2023):  Patient returns to clinic for follow up.  Patient notes worsening of chronic bilateral lower back pain over the past few months.  States issue has been present for over 20+ years.  Previously well managed with conservative measures and medications.  Notes pain is located across his  lower lumbar paraspinal region, also with midline lower lumbar pain.  Occasionally pain radiating into his right lateral hip otherwise denies any radiation into his lower extremities.  Denies any numbness, tingling, weakness, bowel or bladder dysfunction.  States pain is constant and achy.  Worsened with certain activities.  Also worse in the morning when getting out of bed, associated with stiffness.  Recently has followed up with his primary care physician who increase his gabapentin dosage.  Notes that he plans on picking this prescription to the pharmacy later today.    Interval History (1/13/22):  He returns today for follow up.  He reports that diclofenac prescribed by his primary care physician has been very helpful for the bilateral low back pain.  He states that he still has some pain but the diclofenac controlled very well.  He was scheduled to start physical therapy, but his visit was canceled and he was never contacted to reschedule.  Otherwise he denies any changes in the quality or location was pain.  He denies any new worsening symptoms.      Initial Encounter (10/21/21):  Gerson Phillips III is a 71 y.o. male who presents today with chronic bilateral low back pain.  This pain has been present for over 20 years.  No specific inciting events or injuries noted.  The pain is located across the lower lumbar spine.  Pain does not radiate to lower extremities.  Patient does report some bilateral groin pain when ambulating or standing for prolonged periods of time.  He describes his pain as burning but is unsure if it was related to his back.  He denies any associated lower extremity numbness, tingling, weakness, bowel bladder dysfunction..   This pain is described in detail below.    Physical Therapy:  Yes.  Continues regular home exercise program.  Not effective.    Non-pharmacologic Treatment:  Rest helps         TENS?  No    Pain Medications:         Currently taking:  Lyrica    Has tried in the past:   NSAIDs, Tylenol    Has not tried:  Opioids, Muscle relaxants, TCAs, SNRIs, topical creams    Blood thinners:  None    Interventional Therapies:   7/13/13 - right L4 and L5 transforaminal epidural steroid injection - no significant relief noted    Relevant Surgeries:  None    Affecting sleep?  Yes    Affecting daily activities? yes    Depressive symptoms? no          SI/HI? No    Work status: Retired    Pain Scores:    Best:       6/10  Worst:     9/10  Usually:   8/10  Today:    8/10    Pain Disability Index  Family/Home Responsibilities:: 7  Recreation:: 7  Social Activity:: 7  Occupation:: 8  Sexual Behavior:: 8  Self Care:: 7  Life-Support Activities:: 3  Pain Disability Index (PDI): 47    Review of Systems   Constitutional:  Negative for activity change, appetite change, chills, fatigue, fever and unexpected weight change.   HENT:  Negative for hearing loss.    Eyes:  Negative for visual disturbance.   Respiratory:  Negative for chest tightness and shortness of breath.    Cardiovascular:  Negative for chest pain.   Gastrointestinal:  Negative for abdominal pain, constipation, diarrhea, nausea and vomiting.   Genitourinary:  Negative for difficulty urinating.   Musculoskeletal:  Positive for arthralgias, back pain, gait problem and myalgias. Negative for neck pain.   Skin:  Negative for rash.   Neurological:  Negative for dizziness, weakness, light-headedness, numbness and headaches.   Psychiatric/Behavioral:  Positive for sleep disturbance. Negative for hallucinations and suicidal ideas. The patient is not nervous/anxious.        Past Medical History:   Diagnosis Date    Abnormal liver function tests 6/18/2013    Anemia 6/18/2013    Cervical radiculopathy 3/12/2013    CKD stage 3 due to type 2 diabetes mellitus 3/4/2016    Colon polyp     Diabetes mellitus type II, uncontrolled 10/18/2012    Diabetes mellitus with renal manifestations, uncontrolled 9/27/2013    History of colonic polyps 10/5/2015    HTN  (hypertension) 10/18/2012    Hyperlipidemia 10/18/2012    Lateral epicondylitis 6/18/2013    Long-term insulin use 3/4/2016    Low back pain 1/29/2013    Microalbuminuria 3/30/2015    Nuclear sclerosis of both eyes 2/14/2020    Open-angle glaucoma     Primary open angle glaucoma (POAG) of both eyes, moderate stage 2/14/2020    Uncontrolled type 2 diabetes mellitus with proteinuria or albuminuria 3/30/2015    Uncontrolled type 2 diabetes mellitus with proteinuria or microalbuminuria 3/30/2015       Past Surgical History:   Procedure Laterality Date    COLONOSCOPY N/A 9/1/2017    Procedure: COLONOSCOPY;  Surgeon: Gopal Ny MD;  Location: [facility] ENDO;  Service: Endoscopy;  Laterality: N/A;    COLONOSCOPY N/A 3/26/2018    Procedure: COLONOSCOPY;  Surgeon: Jamar Batista MD;  Location: [facility] ENDO ([facility] FLR);  Service: Endoscopy;  Laterality: N/A;       Social History     Socioeconomic History    Marital status:    Tobacco Use    Smoking status: Never     Passive exposure: Never    Smokeless tobacco: Never   Substance and Sexual Activity    Alcohol use: Yes     Alcohol/week: 4.0 - 5.0 standard drinks of alcohol     Types: 3 - 4 Cans of beer, 1 Standard drinks or equivalent per week    Drug use: Not Currently    Sexual activity: Yes     Partners: Female       Review of patient's allergies indicates:  No Known Allergies    Current Outpatient Medications on File Prior to Visit   Medication Sig Dispense Refill    amoxicillin (AMOXIL) 500 MG capsule Take 500 mg by mouth every 6 (six) hours.      atorvastatin (LIPITOR) 20 MG tablet Take 1 tablet (20 mg total) by mouth once daily. 90 tablet 3    blood sugar diagnostic Strp To check blood glucose three times daily, to use with insurance preferred meter 100 each 3    blood-glucose meter,continuous (DEXCOM G7 ) Misc Use with Dexcom G7 sensors 1 each 0    blood-glucose sensor (DEXCOM G7 SENSOR) Ashley Change every 10 days 12 each 3    diltiaZEM (CARDIZEM CD) 240  "MG 24 hr capsule TAKE 1 CAPSULE BY MOUTH ONCE DAILY. 90 capsule 1    hydroCHLOROthiazide (HYDRODIURIL) 12.5 MG Tab TAKE 1 TABLET BY MOUTH EVERY DAY 90 tablet 3    insulin glargine, TOUJEO, (TOUJEO SOLOSTAR U-300 INSULIN) 300 unit/mL (1.5 mL) InPn pen Inject 24 units once daily 3 pen 5    lancets Misc To check blood glucose three times daily, to use with insurance preferred meter 100 each 3    lisinopriL (PRINIVIL,ZESTRIL) 40 MG tablet Take 1 tablet (40 mg total) by mouth once daily. 30 tablet 11    metFORMIN (GLUCOPHAGE) 1000 MG tablet Take 1 tablet (1,000 mg total) by mouth 2 (two) times daily. 180 tablet 2    pen needle, diabetic (BD ULTRA-FINE FORREST PEN NEEDLE) 32 gauge x 5/32" Ndle USE 1X/ each 3    pregabalin (LYRICA) 25 MG capsule TAKE 1 CAPSULE (25 MG TOTAL) BY MOUTH 2 (TWO) TIMES DAILY AS NEEDED (PAIN). 60 capsule 5    semaglutide (OZEMPIC) 2 mg/dose (8 mg/3 mL) PnIj Inject 2 mg into the skin every 7 days. 3 mL 11    blood-glucose meter kit To check blood glucose three times daily, to use with insurance preferred meter 1 each 0    latanoprost 0.005 % ophthalmic solution Place 1 drop into both eyes every evening. 7.5 mL 2     Current Facility-Administered Medications on File Prior to Visit   Medication Dose Route Frequency Provider Last Rate Last Admin    LIDOcaine 2%/EPINEPHrine 1:100,000 injection 2 mL  2 mL Intradermal 1 time in Clinic/HOD Andrew Busch MD           Objective:      BP (!) 144/69 (BP Location: Right arm, Patient Position: Sitting, BP Method: Medium (Automatic))   Pulse 65   SpO2 98%     Exam:  GEN:  Well developed, well nourished.  No acute distress.  Normal pain behavior.  HEENT:  No trauma.  Mucous membranes moist.  Nares patent bilaterally.  PSYCH: Normal affect. Thought content appropriate.  CHEST:  Breathing symmetric.  No audible wheezing.  ABD: Soft, non-distended.  SKIN:  Warm, pink, dry.  No rash on exposed areas.    EXT:  No cyanosis, clubbing, or edema.  No color " change or changes in nail or hair growth.  NEURO/MUSCULOSKELETAL:  Fully alert, oriented, and appropriate. Speech normal chip. No cranial nerve deficits.   Gait:  Normal.  No trendelenburg sign bilaterally.   Motor Strength:  5/5 motor strength throughout lower extremities.   L-Spine:  Full ROM with pain on flexion and extension.  Positive pain with axial/facet loading bilaterally.  Negative SLR bilaterally.    No TTP over lumbar paraspinals, bilateral SI joints, hips, piriformis muscles, or GTB.              Imaging:  Narrative & Impression  EXAMINATION:  MRI LUMBAR SPINE WITHOUT CONTRAST     CLINICAL HISTORY:  Low back pain, symptoms persist with > 6wks conservative treatment; Other intervertebral disc degeneration, lumbar region     TECHNIQUE:  Sagittal T1, T2, stir and axial T1-T2 imaging of the lumbar spine without contrast.     COMPARISON:  11/18/2021     FINDINGS:  The lumbar sagittal alignment is similar to prior with trace grade 1 retrolisthesis of L3 on L4.  Continued degenerative disc disease with disc desiccation height loss and endplate degeneration most pronounced at L4/L5 and L5/S1 levels.  Vertebral body heights and contours relatively stable allowing for degenerative change without evidence for acute fracture..     The distal spinal cord and conus is normal in signal and contour tip of the conus approximates the inferior L1 level.     No aneurysmal dilatation of the visualized abdominal aorta.  There are few scattered small fluid signal foci in the kidneys bilaterally largest along the lower pole on the left measuring 1.3 cm suggestive for probable renal cysts.     T12/L1 through L1/L2: No significant disc bulge, central canal or neural foraminal stenosis.     L2/L3: No significant disc bulge central canal or neural foraminal stenosis.     L3/L4:Continued posterior disc osteophyte with ligamentum flavum hypertrophy and facet arthropathy without significant central canal stenosis with mild  moderate bilateral neural foraminal stenosis.     L4/L5:Continued posterior disc osteophyte with ligamentum flavum hypertrophy and facet arthropathy mild central canal narrowing with moderate right greater than left neural foraminal stenosis.     L5/S1: Posterior disc osteophyte with ligamentum flavum hypertrophy and facet arthropathy without significant central canal stenosis and mild moderate bilateral neural foraminal stenosis.     Impression:     Continued degenerative change lumbar spine most pronounced at L4/L5 with posterior disc osteophyte, uncovertebral joint hypertrophy and facet arthropathy with mild central canal stenosis and moderate bilateral neural foraminal stenosis right greater than left     Several small fluid signal foci within the visualized kidneys partially included in the study while nonspecific suggestive for probable bilateral renal cysts.  These could be further evaluated with dedicated renal imaging.     Please see above for additional details.        Electronically signed by: Asaf Alonso DO  Date:                                            03/01/2024  Time:                                           14:24    Narrative & Impression    EXAMINATION:  MRI LUMBAR SPINE WITHOUT CONTRAST     CLINICAL HISTORY:  lumbar DDD; Other intervertebral disc degeneration, lumbar region     TECHNIQUE:  Multiplanar, multisequence MR images were acquired from the thoracolumbar junction to the sacrum without contrast.     COMPARISON:  09/17/2021     FINDINGS:  Alignment: Normal.     Vertebrae: Degenerative endplate changes are seen in the lower lumbar spine.  No fracture or marrow infiltrative process.     Discs: Partial fusion across the L2-L3 disc space.  Severe disc height loss at L5-S1, mild at L4-L5.  No evidence for discitis.     Cord: Normal.  Conus terminates at L1.     Degenerative findings:     T12-L1: No spinal canal stenosis or neural foraminal narrowing.     L1-L2: No spinal canal stenosis or  neural foraminal narrowing.     L2-L3: No spinal canal stenosis or neural foraminal narrowing.     L3-L4: Circumferential disc bulge and mild facet arthropathy result in mild left neural foraminal narrowing.     L4-L5: Circumferential disc bulge and moderate facet arthropathy result in mild effacement of the thecal sac and moderate right, mild left neural foraminal narrowing.     L5-S1: Circumferential disc bulge and mild facet arthropathy result in mild right neural foraminal narrowing.     Paraspinal muscles & soft tissues: Moderate paraspinal muscle atrophy.     Impression:     1. Multilevel degenerative changes of the lumbar spine.  Moderate right neural foraminal narrowing noted at L4-L5.        Electronically signed by: Ezra Vences MD  Date:                                            11/18/2021  Time:                                           10:35           Narrative & Impression    EXAMINATION:  XR HIPS BILATERAL 2 VIEW INCL AP PELVIS     CLINICAL HISTORY:  Lumbago with sciatica, left side     TECHNIQUE:  AP view of the pelvis and frogleg lateral views of both hips were performed.     COMPARISON:  None.     FINDINGS:  Mild bilateral hip joint space narrowing.     No significant osteophyte formation, sclerosis or geodes.     Normal bilateral femoral head contour.     The bilateral sacroiliac joints appear within normal limits.     No fracture, no osseous lesions.     Atherosclerotic changes of the aorta and its branches.     The soft tissues appear normal.     Impression:     Mild degenerative changes of the bilateral hip joints.        Electronically signed by: Briana Marquez MD  Date:                                            09/17/2021  Time:                                           12:36         Narrative & Impression    EXAMINATION:  XR LUMBAR SPINE COMPLETE 5 VIEW     CLINICAL HISTORY:  Back pain or radiculopathy, > 6 wks;Dorsalgia, unspecified     TECHNIQUE:  AP, lateral, spot and bilateral  oblique views of the lumbar spine were performed.     COMPARISON:  09/03/2015     FINDINGS:  Mild dextroscoliosis of the lumbar spine.     The vertebral body heights are well maintained.     Mild disc space narrowing L5-S1.     The oblique views demonstrate no evidence of spondylolysis.     The bilateral sacroiliac joints appear symmetrical on the AP view.     Prominent right lateral bridging osteophyte at L1-L2 and L2-L3.     The facet joints appear within normal limits.     Impression:     Spondylosis of the lumbar spine. No acute process seen.        Electronically signed by: Briana Marquez MD  Date:                                            09/17/2021  Time:                                           12:34         Assessment:       Encounter Diagnoses   Name Primary?    Lumbar spondylosis Yes    DDD (degenerative disc disease), lumbar     Dorsalgia, unspecified        Plan:       Gerson was seen today for follow-up.    Diagnoses and all orders for this visit:    Lumbar spondylosis  -     Case Request Endoscopy: Bilateral L3, L4, L5 Medial Branch Blocks #1    DDD (degenerative disc disease), lumbar    Dorsalgia, unspecified        Gerson Phillips III is a 71 y.o. male with chronic bilateral low back pain.  Exact etiology somewhat unclear though does appear to be mostly axial and likely related to lower lumbar facet joints.  Lumbar MRI does show multilevel disc degeneration.  Most notable at L4-5 with significant facet joint arthropathy and to a lesser degree at L5-S1 and L3-4.  He does also appear to have moderate right-sided foraminal narrowing L4-5.  Patient does report right lateral hip pain in his noted to have degeneration noted bilateral hip x-ray.  No pain reproduced with internal or external rotation of hips.    Prior records reviewed.  Pertinent imaging studies reviewed by me. Imaging results were discussed with patient.  Schedule for bilateral L3, L4, L5 medial branch blocks.  If successful can  proceed with radiofrequency ablation.  Stressed the importance of maintaining regular home exercise program and being mindful of how they use their back throughout the day.  Patient expressed understanding and agreement.   Return to clinic 2 weeks postprocedure to discuss efficacy    Prince Jaffe PA-C  [facility]  Interventional Pain Management   03/08/2024    This note was created by combination of typed  and M-Modal dictation.  Transcription and phonetic errors may be present.  If there are any questions, please contact me.

## 2024-03-26 ENCOUNTER — OFFICE VISIT (OUTPATIENT)
Dept: FAMILY MEDICINE CLINIC | Facility: CLINIC | Age: 29
End: 2024-03-26
Payer: COMMERCIAL

## 2024-03-26 VITALS
HEART RATE: 86 BPM | WEIGHT: 169.2 LBS | OXYGEN SATURATION: 97 % | BODY MASS INDEX: 26.56 KG/M2 | SYSTOLIC BLOOD PRESSURE: 120 MMHG | TEMPERATURE: 98.4 F | DIASTOLIC BLOOD PRESSURE: 90 MMHG | HEIGHT: 67 IN

## 2024-03-26 DIAGNOSIS — I10 PRIMARY HYPERTENSION: Primary | ICD-10-CM

## 2024-03-26 DIAGNOSIS — D50.9 IRON DEFICIENCY ANEMIA, UNSPECIFIED IRON DEFICIENCY ANEMIA TYPE: ICD-10-CM

## 2024-03-26 DIAGNOSIS — G43.019 INTRACTABLE MIGRAINE WITHOUT AURA AND WITHOUT STATUS MIGRAINOSUS: ICD-10-CM

## 2024-03-26 PROCEDURE — 99214 OFFICE O/P EST MOD 30 MIN: CPT | Performed by: NURSE PRACTITIONER

## 2024-03-26 RX ORDER — LABETALOL 300 MG/1
300 TABLET, FILM COATED ORAL 3 TIMES DAILY
Qty: 180 TABLET | Refills: 3 | Status: SHIPPED | OUTPATIENT
Start: 2024-03-26

## 2024-03-26 NOTE — ASSESSMENT & PLAN NOTE
Improved  Continue labetalol 3 times per day-follow-up in office within no longer breast-feeding to change medications to daily

## 2024-03-26 NOTE — PROGRESS NOTES
"Chief Complaint  6 week follow up (hypertension)    Subjective        KATHY Phan presents to [facility]   as a 28-year-old female for follow-up on hypertension., to review/discuss labs, overall doing ok    She is 12 weeks postpartum.  She has been following up with her OB/GYN and also  On last OB/GYN appointment they did increase her labetalol to 3 times per day  She has been recently diagnosed with migraines and started on Nurtec  She has had some increased anxiety decreased sleep with having a new baby and currently breast-feeding, she is also having problems remembering to take her medication 3 times per day  No blurred vision no dizziness no flushing no chest pain or pressure  She has been monitoring her blood pressure at home  She states last night's reading was 110s over 70s        She is due for her medication in office today      She does have follow-up appointment scheduled with OB/GYN on 3/28/2024      She has no other acute complaint of today      The following portions of the patient's history were reviewed and updated as appropriate: allergies, current medications, past family history, past medical history, past social history, past surgical history, and problem list    Review of Systems   Eyes:  Negative for visual disturbance.   Respiratory:  Negative for cough, shortness of breath, wheezing and stridor.    Cardiovascular:  Negative for chest pain, palpitations and leg swelling.   Gastrointestinal:  Negative for abdominal pain, diarrhea, nausea and vomiting.   Skin:  Negative for rash.   Psychiatric/Behavioral:  Positive for sleep disturbance. Negative for self-injury and suicidal ideas. The patient is nervous/anxious.         Objective   Vital Signs:   Vitals:    03/26/24 0812 03/26/24 0831   BP: 122/100 120/90   Pulse: 86    Temp: 98.4 °F (36.9 °C)    SpO2: 97%    Weight: 76.7 kg (169 lb 3.2 oz)    Height: 170.2 cm (67.01\")                        Physical " Exam  Vitals reviewed.   Constitutional:       General: She is not in acute distress.  Eyes:      Conjunctiva/sclera: Conjunctivae normal.   Neck:      Thyroid: No thyromegaly.      Vascular: No carotid bruit.   Cardiovascular:      Rate and Rhythm: Normal rate and regular rhythm.      Heart sounds: Normal heart sounds. No murmur heard.  Pulmonary:      Effort: Pulmonary effort is normal. No respiratory distress.      Breath sounds: Normal breath sounds. No stridor. No wheezing, rhonchi or rales.   Chest:      Chest wall: No tenderness.   Abdominal:      General: There is no distension.      Palpations: There is no mass.      Tenderness: There is no abdominal tenderness. There is no right CVA tenderness, left CVA tenderness, guarding or rebound.      Hernia: No hernia is present.   Lymphadenopathy:      Cervical: No cervical adenopathy.   Neurological:      Mental Status: She is alert and oriented to person, place, and time.   Psychiatric:         Attention and Perception: Attention normal.         Mood and Affect: Mood normal.          Result Review :     The following data was reviewed by: CHARITY Mcqueen on 03/26/2024:  T4, Free (02/13/2024 09:11)  TSH (02/13/2024 09:11)  Lipid Panel (02/13/2024 09:11)  CBC & Differential (02/13/2024 09:11)  Comprehensive Metabolic Panel (02/13/2024 09:11)       Your cholesterol is just very slightly elevated  But your HDL good cholesterol-is nice and high this is great denies cardioprotection  No need for medication at this time   Continue lifestyle modifications for high cholesterol.  Decrease/eliminate soda, caffeine, alcohol and overall caloric intake. Reduce carbohydrates and sweets in diet.  Continue to improve dietary habits with lean proteins, fresh vegetables, fruits, and nuts. Improve aerobic exercise: walking/biking/swimming daily as tolerated, recommend 30 minutes/day at least.     No other major concerns on your labs  We will plan to repeat these  annually  Assessment and Plan    Assessment & Plan  Primary hypertension  Improved  Continue labetalol 3 times per day-follow-up in office within no longer breast-feeding to change medications to daily  Iron deficiency anemia, unspecified iron deficiency anemia type  Last labs 2/13/2024  Continue prenatal vitamin  Intractable migraine without aura and without status migrainosus  Keep headache diary report any changes or increases  Continue Nurtec recently started with OB/GYN             New Medications Ordered This Visit   Medications    labetalol (NORMODYNE) 300 MG tablet     Sig: Take 1 tablet by mouth 3 times a day. Hold for top blood pressure less than 100, bottom blood pressure less than 60, or heart rate less than 50     Dispense:  180 tablet     Refill:  3          Follow Up   Return in about 3 months (around 6/26/2024) for Annual physical.  Patient was given instructions and counseling regarding her condition or for health maintenance advice. Please see specific information pulled into the AVS if appropriate.   Answers submitted by the patient for this visit:  Primary Reason for Visit (Submitted on 3/21/2024)  What is the primary reason for your visit?: High Blood Pressure

## 2024-03-26 NOTE — ASSESSMENT & PLAN NOTE
Keep headache diary report any changes or increases  Continue Nurtec recently started with OB/GYN

## 2024-03-28 ENCOUNTER — OFFICE VISIT (OUTPATIENT)
Dept: OBSTETRICS AND GYNECOLOGY | Facility: CLINIC | Age: 29
End: 2024-03-28
Payer: COMMERCIAL

## 2024-03-28 VITALS
HEIGHT: 67 IN | BODY MASS INDEX: 26.37 KG/M2 | SYSTOLIC BLOOD PRESSURE: 132 MMHG | WEIGHT: 168 LBS | DIASTOLIC BLOOD PRESSURE: 89 MMHG

## 2024-03-28 DIAGNOSIS — Z30.431 IUD CHECK UP: Primary | ICD-10-CM

## 2024-03-28 NOTE — PROGRESS NOTES
Subjective   Sylvester lCarke is a 28 y.o. female.     Cc:  IUD check    History of Present Illness - Patient is a 28 year old female parous times one who presents for IUD check.  Mirena was placed 4 weeks ago.  She has some bleeding that is tapering off.  No pain or evidence of expulsion.    The following portions of the patient's history were reviewed and updated as appropriate: She  has a past medical history of Chlamydia (), COVID-19 (2023), Gestational hypertension, Headache, and History of  delivery (2024).  She  reports that she has never smoked. She has never been exposed to tobacco smoke. She does not have any smokeless tobacco history on file. She reports that she does not currently use alcohol. She reports that she does not use drugs.  Current Outpatient Medications   Medication Sig Dispense Refill    labetalol (NORMODYNE) 300 MG tablet Take 1 tablet by mouth 3 times a day. Hold for top blood pressure less than 100, bottom blood pressure less than 60, or heart rate less than 50 180 tablet 3    Levonorgestrel (Mirena, 52 MG,) 20 MCG/DAY intrauterine device IUD To be inserted one time by prescriber. Route intrauterine. 1 each 0    prenatal vitamin (prenatal, CLASSIC, vitamin) tablet Take  by mouth Daily.      Rimegepant Sulfate (Nurtec) 75 MG tablet dispersible tablet Take 1 tablet by mouth Every Other Day. 16 tablet 2     No current facility-administered medications for this visit.     She has No Known Allergies..    Review of Systems   Genitourinary:  Negative for pelvic pain and vaginal pain.       Objective   Physical Exam  Vitals reviewed. Exam conducted with a chaperone present.   Constitutional:       Appearance: Normal appearance.   Genitourinary:     General: Normal vulva.      Exam position: Lithotomy position.      Labia:         Right: No rash or tenderness.         Left: No rash or tenderness.       Vagina: Normal.      Cervix: Normal.      Comments: IUD strings noted at  os.  Neurological:      Mental Status: She is alert.   Psychiatric:         Mood and Affect: Mood normal.         Behavior: Behavior normal.         Thought Content: Thought content normal.         Judgment: Judgment normal.         Assessment & Plan   Diagnoses and all orders for this visit:    1. IUD check up (Primary)  -  Reassurance given.  Follow up in one year.    Rusty Seals MD

## 2024-04-02 ENCOUNTER — SPECIALTY PHARMACY (OUTPATIENT)
Dept: NEUROLOGY | Facility: CLINIC | Age: 29
End: 2024-04-02
Payer: COMMERCIAL

## 2024-04-02 NOTE — PROGRESS NOTES
Specialty Pharmacy Refill Coordination Note     Sylvester is a 28 y.o. female contacted today regarding refills of  [facility] specialty medication(s).    Reviewed and verified with patient:       Specialty medication(s) and dose(s) confirmed: yes    Refill Questions      Flowsheet Row Most Recent Value   Changes to allergies? No   Changes to medications? No   New conditions or infections since last clinic visit No   Unplanned office visit, urgent care, ED, or hospital admission in the last 4 weeks  No   How does patient/caregiver feel medication is working? Very good   Financial problems or insurance changes  No   Since the previous refill, were any specialty medication doses or scheduled injections missed or delayed?  No   Does this patient require a clinical escalation to a pharmacist? No            Delivery Questions      Flowsheet Row Most Recent Value   Delivery method Beeline   Delivery address verified with patient/caregiver? Yes   Delivery address Prescriptions   Number of medications in delivery 1   Medication(s) being filled and delivered Rimegepant Sulfate   Doses left of specialty medications 8   Copay verified? Yes   Copay amount $0   Copay form of payment No copayment ($0)                   Follow-up: 21 day(s)     Reina Sharma, Pharmacy Technician  Specialty Pharmacy Technician

## 2024-04-12 ENCOUNTER — NURSE TRIAGE (OUTPATIENT)
Dept: CALL CENTER | Facility: HOSPITAL | Age: 29
End: 2024-04-12
Payer: COMMERCIAL

## 2024-04-12 NOTE — TELEPHONE ENCOUNTER
HUB call- Caller with elevated BP; unable to get thru to PCP    Spoke with caller who reports she missed last night's dose of Labetolol and states BP when awoke this a.m. was 111/93. Instructed caller to recheck BP now. Current BP is 120/100. Denies chest pain, states mild soa with exertion as usual when BP is high. Also reports mild headache but states was diagnosed with migraines several weeks ago. Labetolol is ordered 3x per day.    Guidelines followed, protocols reviewed. Instructed caller to take her normally scheduled a.m. dose of Labetolol and to recheck in 1 hour. Verbalized understanding. Caller was then transferred thru to PCP office to schedule f/u appt. due to elevated diastolic. Information reviewed with staff member prior to transfer.    Reason for Disposition   Systolic BP >= 160 OR Diastolic >= 100   Systolic BP >= 180 OR Diastolic >= 110, and missed most recent dose of blood pressure medication    Additional Information   Negative: Sounds like a life-threatening emergency to the triager   Negative: Symptom is main concern (e.g., headache, chest pain)   Negative: Low blood pressure is main concern   Negative: Systolic BP >= 160 OR Diastolic >= 100, and any cardiac (e.g., breathing difficulty, chest pain) or neurologic symptoms (e.g., new-onset blurred or double vision)   Negative: Pregnant 20 or more weeks (or postpartum < 6 weeks) with new hand or face swelling   Negative: Pregnant 20 or more weeks (or postpartum < 6 weeks) and Systolic BP >= 160 OR Diastolic >= 110   Negative: Patient sounds very sick or weak to the triager   Negative: Systolic BP >= 200 OR Diastolic >= 120 and having NO cardiac or neurologic symptoms   Negative: Pregnant 20 or more weeks (or postpartum < 6 weeks) with Systolic BP >= 140 OR Diastolic >= 90   Negative: Systolic BP >= 180 OR Diastolic >= 110   Negative: Patient wants to be seen   Negative: Ran out of BP medications   Negative: Taking BP medications and feels is  "having side effects (e.g., impotence, cough, dizziness)    Answer Assessment - Initial Assessment Questions  1. BLOOD PRESSURE: \"What is the blood pressure?\" \"Did you take at least two measurements 5 minutes apart?\"      111/93 prior to call. Currently 120/100  2. ONSET: \"When did you take your blood pressure?\"      This a.m. and current  3. HOW: \"How did you take your blood pressure?\" (e.g., automatic home BP monitor, visiting nurse)      Automated in home cuff  4. HISTORY: \"Do you have a history of high blood pressure?\"      Yes  5. MEDICINES: \"Are you taking any medicines for blood pressure?\" \"Have you missed any doses recently?\"      Labetolol  6. OTHER SYMPTOMS: \"Do you have any symptoms?\" (e.g., blurred vision, chest pain, difficulty breathing, headache, weakness)      Mild soa with exertion  7. PREGNANCY: \"Is there any chance you are pregnant?\" \"When was your last menstrual period?\"      Currently breastfeeding.    Protocols used: Blood Pressure - High-ADULT-[place]    "

## 2024-05-01 ENCOUNTER — SPECIALTY PHARMACY (OUTPATIENT)
Dept: NEUROLOGY | Facility: CLINIC | Age: 29
End: 2024-05-01
Payer: COMMERCIAL

## 2024-05-01 NOTE — PROGRESS NOTES
Specialty Pharmacy Refill Coordination Note     Sylvester is a 28 y.o. female contacted today regarding refills of  [facility] specialty medication(s).    Reviewed and verified with patient:       Specialty medication(s) and dose(s) confirmed: yes    Refill Questions      Flowsheet Row Most Recent Value   Changes to allergies? No   Changes to medications? No   New conditions or infections since last clinic visit No   Unplanned office visit, urgent care, ED, or hospital admission in the last 4 weeks  No   How does patient/caregiver feel medication is working? Very good   Financial problems or insurance changes  No   Since the previous refill, were any specialty medication doses or scheduled injections missed or delayed?  No   Does this patient require a clinical escalation to a pharmacist? No            Delivery Questions      Flowsheet Row Most Recent Value   Delivery method Beeline   Delivery address verified with patient/caregiver? Yes   Delivery address Home   Number of medications in delivery 1   Medication(s) being filled and delivered Rimegepant Sulfate   Copay verified? Yes   Copay amount $0   Copay form of payment No copayment ($0)                   Follow-up: 21 day(s)     Reina Sharma, Pharmacy Technician  Specialty Pharmacy Technician

## 2024-05-29 ENCOUNTER — SPECIALTY PHARMACY (OUTPATIENT)
Dept: INFUSION THERAPY | Facility: HOSPITAL | Age: 29
End: 2024-05-29
Payer: COMMERCIAL

## 2024-05-29 RX ORDER — RIMEGEPANT SULFATE 75 MG/75MG
75 TABLET, ORALLY DISINTEGRATING ORAL EVERY OTHER DAY
Qty: 16 TABLET | Refills: 5 | Status: SHIPPED | OUTPATIENT
Start: 2024-05-29 | End: 2024-06-03 | Stop reason: SDUPTHER

## 2024-05-29 NOTE — PROGRESS NOTES
Nurtec is currently out of refills. A renewal for Nurtec 75mg QOD #16 with 5 refills has been sent to [facility].   Tim Yin [facility]  05/29/24  09:41 EDT

## 2024-06-03 ENCOUNTER — OFFICE VISIT (OUTPATIENT)
Dept: NEUROLOGY | Facility: CLINIC | Age: 29
End: 2024-06-03
Payer: COMMERCIAL

## 2024-06-03 ENCOUNTER — SPECIALTY PHARMACY (OUTPATIENT)
Dept: INFUSION THERAPY | Facility: HOSPITAL | Age: 29
End: 2024-06-03
Payer: COMMERCIAL

## 2024-06-03 ENCOUNTER — SPECIALTY PHARMACY (OUTPATIENT)
Dept: NEUROLOGY | Facility: CLINIC | Age: 29
End: 2024-06-03
Payer: COMMERCIAL

## 2024-06-03 VITALS
HEIGHT: 67 IN | SYSTOLIC BLOOD PRESSURE: 122 MMHG | BODY MASS INDEX: 25.9 KG/M2 | WEIGHT: 165 LBS | DIASTOLIC BLOOD PRESSURE: 78 MMHG | OXYGEN SATURATION: 98 % | HEART RATE: 64 BPM

## 2024-06-03 DIAGNOSIS — G43.019 INTRACTABLE MIGRAINE WITHOUT AURA AND WITHOUT STATUS MIGRAINOSUS: Primary | ICD-10-CM

## 2024-06-03 PROCEDURE — 99213 OFFICE O/P EST LOW 20 MIN: CPT | Performed by: PSYCHIATRY & NEUROLOGY

## 2024-06-03 RX ORDER — RIMEGEPANT SULFATE 75 MG/75MG
75 TABLET, ORALLY DISINTEGRATING ORAL EVERY OTHER DAY
Qty: 16 TABLET | Refills: 5 | Status: SHIPPED | OUTPATIENT
Start: 2024-06-03

## 2024-06-03 RX ORDER — RIMEGEPANT SULFATE 75 MG/75MG
75 TABLET, ORALLY DISINTEGRATING ORAL EVERY OTHER DAY
Start: 2024-06-03 | End: 2024-06-03 | Stop reason: SDUPTHER

## 2024-06-03 NOTE — ASSESSMENT & PLAN NOTE
28 year old left handed woman with migraines.  She reports a history of headaches starting 9-10 years ago.  Her headaches start on the right side and moves to involve her entire head which she describes as a dull pain which she rates as 8-9/10 on pain scale 1-10 when most severe with associated light and sound sensitivity along with nausea without vomiting.  The headaches can last up to 3-4 days in duration.  She has a constant daily headache which can get worse.  She delivered her healthy baby 2 months ago.  She did have preeclampsia with the pregnancy and the headache at that time may have been related to the preeclampsia.  Of note she had brain MRI/MRV scan in 2023 which does not demonstrate any acute intracranial abnormalities.  No known family history of migraines.  She is currently on labetalol which they have increased the dosing most recently.  She has an IUD.  She has tried ibuprofen, Tylenol and magnesium oxide which is all she has tried.  She is currently breastfeeding. She has not really noticed any triggers for her headaches. She denies any auras with her migraines. She does not smoke. I spoke with her about treatment of her migraines with combination of preventative and acute medicines along with lifestyle modifications and triggers to avoid. Since she is breast feeding I will continue her on Nurtec ODT which appears to be safe with breastfeeding as the molecule is too large to get into breast milk at any concerning amount. She can use this every other day for prevention of her migraines and possibly acute treatment. Provided patient education information on migraine triggers and lifestyle modification today.

## 2024-06-03 NOTE — PROGRESS NOTES
Specialty Pharmacy Refill Coordination Note     Sylvester is a 28 y.o. female contacted today regarding refills of  [facility] specialty medication(s).    Reviewed and verified with patient:       Specialty medication(s) and dose(s) confirmed: yes    Refill Questions      Flowsheet Row Most Recent Value   Changes to allergies? No   Changes to medications? No   New conditions or infections since last clinic visit No   Unplanned office visit, urgent care, ED, or hospital admission in the last 4 weeks  No   How does patient/caregiver feel medication is working? Very good   Financial problems or insurance changes  No   Since the previous refill, were any specialty medication doses or scheduled injections missed or delayed?  No   Does this patient require a clinical escalation to a pharmacist? No            Delivery Questions      Flowsheet Row Most Recent Value   Delivery method Beeline   Delivery address verified with patient/caregiver? Yes   Delivery address Home   Number of medications in delivery 1   Medication(s) being filled and delivered Rimegepant Sulfate   Doses left of specialty medications 8   Copay verified? Yes   Copay amount $0   Copay form of payment No copayment ($0)                   Follow-up: 21 day(s)     Reina Sharma, Pharmacy Technician  Specialty Pharmacy Technician

## 2024-06-03 NOTE — PROGRESS NOTES
Chief Complaint  Migraine (Nurtec- works well- when she remembers to take it-)    Subjective          Sylvester Clarke presents to [facility] for   HISTORY OF PRESENT ILLNESS:    Sylvester Clarke is a 28 year old left handed woman who presents to neurology clinic for initial evaluation and treatment of migraines.  She reports a history of headaches starting 9-10 years ago.  Her headaches start on the right side and moves to involve her entire head which she describes as a dull pain which she rates as 8-9/10 on pain scale 1-10 when most severe with associated light and sound sensitivity along with nausea without vomiting.  The headaches can last up to 3-4 days in duration.  She has a constant daily headache which can get worse.  She delivered her healthy baby 2 months ago.  She did have preeclampsia with the pregnancy and the headache at that time may have been related to the preeclampsia.  Of note she had brain MRI/MRV scan in  which does not demonstrate any acute intracranial abnormalities.  No known family history of migraines.  She is currently on labetalol which they have increased the dosing most recently.  She has an IUD.  She has tried ibuprofen, Tylenol and magnesium oxide which is all she has tried.  She is currently breastfeeding.  She has not really noticed any triggers for her headaches.  She denies any auras with her migraines.  She does not smoke.     Past Medical History:   Diagnosis Date    Chlamydia 2016    COVID-19 2023    2022 10/2021-Vaxxed and Boosted    Gestational hypertension     Headache     History of  delivery 2024    @34 weeks    Migraine 2023    Diagnosed 3/2024        Family History   Problem Relation Age of Onset    Other Father         Blood clots    Diabetes Sister     Diabetes Maternal Grandmother     Diabetes Maternal Grandfather     Thyroid cancer Paternal Grandmother         Social History     Socioeconomic History     "Marital status: Single   Tobacco Use    Smoking status: Never     Passive exposure: Never   Vaping Use    Vaping status: Never Used   Substance and Sexual Activity    Alcohol use: Yes     Alcohol/week: 3.0 standard drinks of alcohol     Types: 3 Drinks containing 0.5 oz of alcohol per week    Drug use: Never    Sexual activity: Yes     Partners: Male     Birth control/protection: I.U.D.     Comment: 2-29-24        I have reviewed and confirmed the accuracy of the ROS as documented by the MA/LPN/RN Wilfrido Gomez MD   Review of Systems   Neurological:  Positive for dizziness, light-headedness and headache. Negative for tremors, seizures, syncope, facial asymmetry, speech difficulty, weakness, numbness, memory problem and confusion.   Psychiatric/Behavioral:  Negative for agitation, behavioral problems, decreased concentration, dysphoric mood, hallucinations, self-injury, sleep disturbance, suicidal ideas, negative for hyperactivity, depressed mood and stress. The patient is not nervous/anxious.         Objective   Vital Signs:   /78   Pulse 64   Ht 170.2 cm (67.01\")   Wt 74.8 kg (165 lb)   SpO2 98%   BMI 25.84 kg/m²       PHYSICAL EXAM:    General   Mental Status - Alert. General Appearance - Well developed, Well groomed, Oriented and Cooperative. Orientation - Oriented X3.       Head and Neck  Head - normocephalic, atraumatic with no lesions or palpable masses.  Neck    Global Assessment - supple.       Eye   Sclera/Conjunctiva - Bilateral - Normal.    ENMT  Mouth and Throat   Oral Cavity/Oropharynx: Oropharynx - the soft palate,uvula and tongue are normal in appearance.    Chest and Lung Exam   Chest - lung clear to auscultation bilaterally.    Cardiovascular   Cardiovascular examination reveals  - normal heart sounds, regular rate and rhythm.    Neurologic   Mental Status: Speech - Normal. Cognitive function - appropriate fund of knowledge. No impairment of attention, Impairment of concentration, " impairment of long term memory or impairment of short term memory.  Cranial Nerves:   II Optic: Visual acuity - Left - Normal. Right - Normal. Visual fields - Normal (to confrontation).  III Oculomotor: Pupillary constriction - Left - Normal. Right - Normal.  VII Facial: - Normal Bilaterally.   IX Glossopharyngeal / X Vagus - Normal.  XI Accessory: Trapezius - Bilateral - Normal. Sternocleidomastoid - Bilateral - Normal.  XII Hypoglossal - Bilateral - Normal.  Eye Movements: - Normal Bilaterally.  Sensory:   Light Touch: Intact - Globally.  Motor:   Bulk and Contour: - Normal.  Tone: - Normal.  Tremor: Not present.  Strength: 5/5 normal muscle strength - All Muscles.   General Assessment of Reflexes: - deep tendon reflexes are normal. Coordination - No Impairment of finger-to-nose or Impairment of rapid alternating movements. Gait - Normal.       Result Review :                 Assessment and Plan    Problem List Items Addressed This Visit          Neuro    Intractable migraine without aura and without status migrainosus - Primary    Current Assessment & Plan     28 year old left handed woman with migraines.  She reports a history of headaches starting 9-10 years ago.  Her headaches start on the right side and moves to involve her entire head which she describes as a dull pain which she rates as 8-9/10 on pain scale 1-10 when most severe with associated light and sound sensitivity along with nausea without vomiting.  The headaches can last up to 3-4 days in duration.  She has a constant daily headache which can get worse.  She delivered her healthy baby 2 months ago.  She did have preeclampsia with the pregnancy and the headache at that time may have been related to the preeclampsia.  Of note she had brain MRI/MRV scan in 2023 which does not demonstrate any acute intracranial abnormalities.  No known family history of migraines.  She is currently on labetalol which they have increased the dosing most recently.  She  has an IUD.  She has tried ibuprofen, Tylenol and magnesium oxide which is all she has tried.  She is currently breastfeeding. She has not really noticed any triggers for her headaches. She denies any auras with her migraines. She does not smoke. I spoke with her about treatment of her migraines with combination of preventative and acute medicines along with lifestyle modifications and triggers to avoid. Since she is breast feeding I will continue her on Nurtec ODT which appears to be safe with breastfeeding as the molecule is too large to get into breast milk at any concerning amount. She can use this every other day for prevention of her migraines and possibly acute treatment. Provided patient education information on migraine triggers and lifestyle modification today.           Relevant Medications    Rimegepant Sulfate (Nurtec) 75 MG tablet dispersible tablet       Follow Up   Return in about 6 months (around 12/3/2024).  Patient was given instructions and counseling regarding her condition or for health maintenance advice. Please see specific information pulled into the AVS if appropriate.

## 2024-06-27 ENCOUNTER — SPECIALTY PHARMACY (OUTPATIENT)
Dept: NEUROLOGY | Facility: CLINIC | Age: 29
End: 2024-06-27
Payer: COMMERCIAL

## 2024-06-27 NOTE — PROGRESS NOTES
Specialty Pharmacy Refill Coordination Note     Sylvester is a 28 y.o. female contacted today regarding refills of  [facility] specialty medication(s).    Reviewed and verified with patient:       Specialty medication(s) and dose(s) confirmed: yes    Refill Questions      Flowsheet Row Most Recent Value   Changes to allergies? No   Changes to medications? No   New conditions or infections since last clinic visit No   Unplanned office visit, urgent care, ED, or hospital admission in the last 4 weeks  No   How does patient/caregiver feel medication is working? Very good   Financial problems or insurance changes  No   Since the previous refill, were any specialty medication doses or scheduled injections missed or delayed?  No   Does this patient require a clinical escalation to a pharmacist? No            Delivery Questions      Flowsheet Row Most Recent Value   Delivery method Beeline   Delivery address verified with patient/caregiver? Yes   Delivery address Home   Number of medications in delivery 1   Medication(s) being filled and delivered Rimegepant Sulfate   Doses left of specialty medications 7   Copay verified? Yes   Copay amount $0   Copay form of payment No copayment ($0)                   Follow-up: 21 day(s)     Reina Sharma, Pharmacy Technician  Specialty Pharmacy Technician

## 2024-07-24 ENCOUNTER — SPECIALTY PHARMACY (OUTPATIENT)
Dept: NEUROLOGY | Facility: CLINIC | Age: 29
End: 2024-07-24
Payer: COMMERCIAL

## 2024-07-24 RX ORDER — RIMEGEPANT SULFATE 75 MG/75MG
75 TABLET, ORALLY DISINTEGRATING ORAL ONCE AS NEEDED
Qty: 16 TABLET | Refills: 5 | Status: SHIPPED | OUTPATIENT
Start: 2024-07-24

## 2024-08-22 ENCOUNTER — OFFICE VISIT (OUTPATIENT)
Dept: OBSTETRICS AND GYNECOLOGY | Facility: CLINIC | Age: 29
End: 2024-08-22
Payer: COMMERCIAL

## 2024-08-22 VITALS
HEIGHT: 67 IN | DIASTOLIC BLOOD PRESSURE: 89 MMHG | SYSTOLIC BLOOD PRESSURE: 126 MMHG | WEIGHT: 176 LBS | BODY MASS INDEX: 27.62 KG/M2

## 2024-08-22 DIAGNOSIS — R10.2 PELVIC PAIN: Primary | ICD-10-CM

## 2024-08-22 DIAGNOSIS — B37.9 CANDIDA INFECTION: ICD-10-CM

## 2024-08-22 DIAGNOSIS — N93.9 ABNORMAL UTERINE BLEEDING (AUB): ICD-10-CM

## 2024-08-22 LAB
B-HCG UR QL: NEGATIVE
BILIRUB BLD-MCNC: NEGATIVE MG/DL
EXPIRATION DATE: NORMAL
GLUCOSE UR STRIP-MCNC: NEGATIVE MG/DL
INTERNAL NEGATIVE CONTROL: NEGATIVE
INTERNAL POSITIVE CONTROL: POSITIVE
KETONES UR QL: NEGATIVE
LEUKOCYTE EST, POC: NEGATIVE
Lab: NORMAL
NITRITE UR-MCNC: NEGATIVE MG/ML
PROT UR STRIP-MCNC: NEGATIVE MG/DL
RBC # UR STRIP: ABNORMAL /UL

## 2024-08-22 RX ORDER — FLUCONAZOLE 150 MG/1
150 TABLET ORAL
Qty: 2 TABLET | Refills: 0 | Status: SHIPPED | OUTPATIENT
Start: 2024-08-22

## 2024-08-22 NOTE — PROGRESS NOTES
"GYN VISIT    Chief Complaint   Patient presents with    Follow-up     Here today for irregular bleeding and cramping.        MARTINA Phan is a 29 y.o.  who presents with irregular bleeding and cramping. She does have a  Mirena IUD that was placed 2024. Since then she has only had around a 2 week time of not having any spotting or bleeding. She reports that she was recently was diagnosed with migraine headaches without aura.     LMP: No LMP recorded. (Menstrual status: Other).    Past Medical History:   Diagnosis Date    Chlamydia 2016    COVID-19 2023    2022 10/2021-Vaxxed and Boosted    Gestational hypertension     Headache     History of  delivery 2024    @34 weeks    Migraine 2023    Diagnosed 3/2024        Past Surgical History:   Procedure Laterality Date    CYSTECTOMY  2015    Lower Back    FINGER SURGERY Left     Pinky    WISDOM TOOTH EXTRACTION          Review of Systems   Constitutional:  Negative for chills, diaphoresis, fatigue and fever.   Genitourinary:  Positive for menstrual problem and pelvic pain. Negative for decreased urine volume, difficulty urinating, dyspareunia, dysuria, flank pain, frequency, genital sores, hematuria, pelvic pressure, urgency, urinary incontinence, vaginal bleeding, vaginal discharge and vaginal pain.   Hematological:  Negative for adenopathy.   All other systems reviewed and are negative.      OBJECTIVE     Vitals:    24 1425   BP: 126/89   Weight: 79.8 kg (176 lb)   Height: 170.2 cm (67.01\")        Physical Exam  Constitutional:       General: She is awake.      Appearance: Normal appearance. She is well-developed and well-groomed.   Genitourinary:      Right Labia: rash and tenderness.      Left Labia: tenderness and rash.              Cervical friability present.      IUD strings visualized.   HENT:      Head: Normocephalic and atraumatic.   Pulmonary:      Effort: Pulmonary effort is normal. "   Musculoskeletal:      Cervical back: Normal range of motion.   Neurological:      General: No focal deficit present.      Mental Status: She is alert and oriented to person, place, and time.   Skin:     General: Skin is warm and dry.   Psychiatric:         Mood and Affect: Mood normal.         Behavior: Behavior normal. Behavior is cooperative.   Vitals reviewed.         ASSESSMENT/PLAN    Diagnoses and all orders for this visit:    1. Pelvic pain (Primary)  -     POC Urinalysis Dipstick  -     POC Pregnancy, Urine  -     Genital Mycoplasmas MAX, Swab - Swab, Cervix  -     NuSwab VG+ - Swab, Vagina  -     Culture, Routine - Swab, Vagina    2. Abnormal uterine bleeding (AUB)  -     POC Urinalysis Dipstick  -     POC Pregnancy, Urine  -     Genital Mycoplasmas MAX, Swab - Swab, Cervix  -     NuSwab VG+ - Swab, Vagina  -     Culture, Routine - Swab, Vagina    3. Candida infection  -     fluconazole (DIFLUCAN) 150 MG tablet; Take 1 tablet by mouth Every 3 (Three) Days.  Dispense: 2 tablet; Refill: 0        Discussed return to care in 2 weeks for ultrasound and office visit to verify placement of IUD.   Vaginal cultures obtained.     Return in about 2 weeks (around 9/5/2024) for ultrasound and office visit.    I spent 30 minutes caring for Sylvester on this date of service. This time includes time spent by me in the following activities: preparing for the visit, reviewing tests, performing a medically appropriate examination and/or evaluation, counseling and educating the patient/family/caregiver, referring and communicating with other health care professionals, documenting information in the medical record, independently interpreting results and communicating that information with the patient/family/caregiver, care coordination, ordering medications, ordering test(s), ordering procedure(s), obtaining a separately obtained history, and reviewing a separately obtained history.    Marlene Zamarripa CNM  8/22/2024  15:19  EDT

## 2024-08-24 LAB
A VAGINAE DNA VAG QL NAA+PROBE: NORMAL SCORE
BVAB2 DNA VAG QL NAA+PROBE: NORMAL SCORE
C ALBICANS DNA VAG QL NAA+PROBE: NEGATIVE
C GLABRATA DNA VAG QL NAA+PROBE: NEGATIVE
C TRACH DNA SPEC QL NAA+PROBE: NEGATIVE
MEGA1 DNA VAG QL NAA+PROBE: NORMAL SCORE
N GONORRHOEA DNA VAG QL NAA+PROBE: NEGATIVE
T VAGINALIS DNA VAG QL NAA+PROBE: NEGATIVE

## 2024-08-25 LAB
BACTERIA SPEC CULT: NORMAL
M GENITALIUM DNA SPEC QL NAA+PROBE: NEGATIVE
M HOMINIS DNA SPEC QL NAA+PROBE: NEGATIVE
MICROORGANISM/AGENT SPEC: NORMAL
UREAPLASMA DNA SPEC QL NAA+PROBE: NEGATIVE

## 2024-09-04 ENCOUNTER — TELEPHONE (OUTPATIENT)
Dept: OBSTETRICS AND GYNECOLOGY | Facility: CLINIC | Age: 29
End: 2024-09-04

## 2024-09-04 NOTE — TELEPHONE ENCOUNTER
DELETE AFTER REVIEWING: Send this encounter to the appropriate pool. See your Call Action Grid or Workflows for direction.    Caller: STANISLAW WHITEHEAD    Relationship to patient: SELF    Best call back number: [phone]        Requested date: 09--4-24     If rescheduling, when is the original appointment: 09-04-24     Additional notes:PT CALLED AND IS REQUESTING TODAY'S APPT, PT WOULD LIKE A CALL BACK, UNABLE TO WARM TRANSFER THE CALL TO THE PRACTICE

## 2024-09-12 ENCOUNTER — OFFICE VISIT (OUTPATIENT)
Dept: OBSTETRICS AND GYNECOLOGY | Facility: CLINIC | Age: 29
End: 2024-09-12
Payer: COMMERCIAL

## 2024-09-12 VITALS
DIASTOLIC BLOOD PRESSURE: 82 MMHG | WEIGHT: 176 LBS | SYSTOLIC BLOOD PRESSURE: 120 MMHG | BODY MASS INDEX: 27.62 KG/M2 | HEIGHT: 67 IN

## 2024-09-12 DIAGNOSIS — N89.8 VAGINAL DISCHARGE: Primary | ICD-10-CM

## 2024-09-12 DIAGNOSIS — Z30.432 ENCOUNTER FOR IUD REMOVAL: ICD-10-CM

## 2024-09-12 DIAGNOSIS — Z30.015 ENCOUNTER FOR INITIAL PRESCRIPTION OF VAGINAL RING HORMONAL CONTRACEPTIVE: ICD-10-CM

## 2024-09-12 RX ORDER — ETONOGESTREL AND ETHINYL ESTRADIOL VAGINAL RING .015; .12 MG/D; MG/D
RING VAGINAL
Qty: 3 EACH | Refills: 12 | Status: SHIPPED | OUTPATIENT
Start: 2024-09-12

## 2024-09-12 RX ORDER — FLUCONAZOLE 150 MG/1
150 TABLET ORAL
Qty: 2 TABLET | Refills: 0 | Status: SHIPPED | OUTPATIENT
Start: 2024-09-12

## 2024-09-12 RX ORDER — TERCONAZOLE 0.4 %
1 CREAM WITH APPLICATOR VAGINAL NIGHTLY
Qty: 45 G | Refills: 0 | Status: SHIPPED | OUTPATIENT
Start: 2024-09-12 | End: 2024-09-17

## 2024-09-12 NOTE — PROGRESS NOTES
"VISIT FOR IUD REMOVAL    Chief Complaint   Patient presents with    Follow-up     Here today after U/S        SUBJECTIVE     Sylvester is a 29 y.o.  who presents for removal of her IUD without replacement. TVUS showed her IUD in her cervix. She would like to do NuvaRings after IUD removal.     Past Medical History:   Diagnosis Date    Chlamydia 2016    COVID-19 2023    2022 10/2021-Vaxxed and Boosted    Gestational hypertension     Headache     History of  delivery 2024    @34 weeks    Migraine 2023    Diagnosed 3/2024        Past Surgical History:   Procedure Laterality Date    CYSTECTOMY  2015    Lower Back    FINGER SURGERY Left     Pinky    WISDOM TOOTH EXTRACTION          Review of Systems   Constitutional:  Negative for chills, diaphoresis, fatigue and fever.   Genitourinary:  Positive for vaginal discharge. Negative for decreased urine volume, difficulty urinating, dyspareunia, dysuria, flank pain, frequency, genital sores, hematuria, pelvic pain, urgency and vaginal pain.   Hematological:  Negative for adenopathy.   All other systems reviewed and are negative.      OBJECTIVE    Vitals:    24 1114   BP: 120/82   Weight: 79.8 kg (176 lb)   Height: 170.2 cm (67.01\")        Physical Exam  Constitutional:       General: She is awake.      Appearance: Normal appearance. She is well-developed and well-groomed.   Genitourinary:      Genitourinary Comments: Discharge noted consistent with candida.       Vaginal discharge present.      Cervical discharge present.   HENT:      Head: Normocephalic and atraumatic.   Pulmonary:      Effort: Pulmonary effort is normal.   Musculoskeletal:      Cervical back: Normal range of motion.   Neurological:      General: No focal deficit present.      Mental Status: She is alert and oriented to person, place, and time.   Skin:     General: Skin is warm and dry.   Psychiatric:         Mood and Affect: Mood normal.         Behavior: " Behavior normal. Behavior is cooperative.   Vitals reviewed.         ASSESSMENT/PLAN    Diagnoses and all orders for this visit:    1. Vaginal discharge (Primary)  -     terconazole (TERAZOL 7) 0.4 % vaginal cream; Insert 1 applicator into the vagina Every Night for 5 days.  Dispense: 45 g; Refill: 0  -     fluconazole (DIFLUCAN) 150 MG tablet; Take 1 tablet by mouth Every 3 (Three) Days.  Dispense: 2 tablet; Refill: 0    2. Encounter for IUD removal  -     etonogestrel-ethinyl estradiol (NuvaRing) 0.12-0.015 MG/24HR vaginal ring; Insert vaginally and leave in place for 3 consecutive weeks, then remove for 1 week.  Dispense: 3 each; Refill: 12  -     POC Pregnancy, Urine    3. Encounter for initial prescription of vaginal ring hormonal contraceptive  -     etonogestrel-ethinyl estradiol (NuvaRing) 0.12-0.015 MG/24HR vaginal ring; Insert vaginally and leave in place for 3 consecutive weeks, then remove for 1 week.  Dispense: 3 each; Refill: 12              IUD REMOVAL PROCEDURE      CHIEF COMPLAINT:     She appears stable today and agrees to proceed with IUD removal.       TYPE OF IUD:  Mirena  DATE OF INSERTION:  known  REASON FOR REMOVAL:  Side effect: pelvic pain  OTHER RELEVANT HISTORY/INFORMATION:  none    PRE PROCEDURE DIAGNOSIS:   PROCEDURE TIME OUT DOCUMENTATION: Preprocedure verification is complete patient verified and consents confirmed, procedure sites are identified, and timeout was called before the start of the procedure., Timeout was called before the start of the procedure., and Preprocedure verification is complete- patient verified and consents confirmed.      PROCEDURE DETAILS  IUD STRINGS VISIBLE:  no  LOCAL ANESTHESIA:  None  TENACULUM USED: None  REMOVAL: IUD strings grasped and IUD removed intact with gentle traction.  The patient tolerated the procedure well.    All appropriate instructions regarding removal were reviewed.Bimanual exam revealed Normal size.  A speculum was inserted. The IUD  strings were seen, grasped with a ring forcep and removed without difficulty. The Mirena IUD was inspected and noted to be removed in its entirety.    COMPLICATIONS: Tolerated well. No apparent complications    POST PROCEDURE DIAGNOSIS:     PLANS FOR CONTRACEPTION: NuvaRing vaginal inserts    OTHER FOLLOW-UP NEEDED:  none    The patient was advised to call for any fever or for prolonged or severe pain or bleeding. She was advised to use OTC acetaminophen and OTC ibuprofen as needed for mild to moderate pain.     Return for annual exam or as needed before.    I spent 30 minutes caring for Sylvester on this date of service. This time includes time spent by me in the following activities: preparing for the visit, reviewing tests, performing a medically appropriate examination and/or evaluation, counseling and educating the patient/family/caregiver, referring and communicating with other health care professionals, documenting information in the medical record, independently interpreting results and communicating that information with the patient/family/caregiver, care coordination, ordering medications, ordering test(s), ordering procedure(s), obtaining a separately obtained history, and reviewing a separately obtained history    Marlene Zamarripa CNM  9/12/2024  12:13 EDT

## 2024-09-12 NOTE — PROGRESS NOTES
"GYN VISIT    Chief Complaint   Patient presents with    Follow-up     Here today after U/S        SUBJECTIVE    Sylvester is a 29 y.o.  who presents with pelvic pain with an IUD in place. She reports that this is her 3rd IUD, the first 2 having been removed because the placement of them shifted. She reports that this has been ongoing for around a week. She also reports vaginal discharge that began recently as well. She has had numerous yeast infections before and she feels that this is the same.     LMP: No LMP recorded. (Menstrual status: Other).    Past Medical History:   Diagnosis Date    Chlamydia 2016    COVID-19 2023    2022 10/2021-Vaxxed and Boosted    Gestational hypertension     Headache     History of  delivery 2024    @34 weeks    Migraine 2023    Diagnosed 3/2024        Past Surgical History:   Procedure Laterality Date    CYSTECTOMY  2015    Lower Back    FINGER SURGERY Left     Pinky    WISDOM TOOTH EXTRACTION          Review of Systems   Constitutional:  Negative for chills, diaphoresis, fatigue and fever.   Genitourinary:  Positive for pelvic pain and vaginal discharge. Negative for decreased urine volume, difficulty urinating, dyspareunia, dysuria, flank pain, frequency, genital sores, hematuria, menstrual problem, urgency, urinary incontinence, vaginal bleeding and vaginal pain.   Hematological:  Negative for adenopathy.   All other systems reviewed and are negative.      OBJECTIVE     Vitals:    24 1114   BP: 120/82   Weight: 79.8 kg (176 lb)   Height: 170.2 cm (67.01\")        Physical Exam  Constitutional:       General: She is awake.      Appearance: Normal appearance. She is well-developed and well-groomed.   Genitourinary:      No lesions in the vagina.      Vaginal discharge present.      No vaginal erythema, tenderness or ulceration.      Cervical discharge present.      No cervical friability or lesion.   HENT:      Head: Normocephalic and " atraumatic.   Pulmonary:      Effort: Pulmonary effort is normal.   Musculoskeletal:      Cervical back: Normal range of motion.   Neurological:      General: No focal deficit present.      Mental Status: She is alert and oriented to person, place, and time.   Skin:     General: Skin is warm and dry.   Psychiatric:         Mood and Affect: Mood normal.         Behavior: Behavior normal. Behavior is cooperative.   Vitals reviewed.         ASSESSMENT/PLAN    Diagnoses and all orders for this visit:    1. Vaginal discharge (Primary)  -     terconazole (TERAZOL 7) 0.4 % vaginal cream; Insert 1 applicator into the vagina Every Night for 5 days.  Dispense: 45 g; Refill: 0  -     fluconazole (DIFLUCAN) 150 MG tablet; Take 1 tablet by mouth Every 3 (Three) Days.  Dispense: 2 tablet; Refill: 0    2. Encounter for IUD removal  -     etonogestrel-ethinyl estradiol (NuvaRing) 0.12-0.015 MG/24HR vaginal ring; Insert vaginally and leave in place for 3 consecutive weeks, then remove for 1 week.  Dispense: 3 each; Refill: 12  -     POC Pregnancy, Urine    3. Encounter for initial prescription of vaginal ring hormonal contraceptive  -     etonogestrel-ethinyl estradiol (NuvaRing) 0.12-0.015 MG/24HR vaginal ring; Insert vaginally and leave in place for 3 consecutive weeks, then remove for 1 week.  Dispense: 3 each; Refill: 12    IUD removed without replacement - see additional procedure note.   Would like to use nuvarings instead. ACHES discussed.     Return for annual exam or as needed before.    I spent 30 minutes caring for Sylvester on this date of service. This time includes time spent by me in the following activities: preparing for the visit, reviewing tests, performing a medically appropriate examination and/or evaluation, counseling and educating the patient/family/caregiver, referring and communicating with other health care professionals, documenting information in the medical record, independently interpreting results  and communicating that information with the patient/family/caregiver, care coordination, ordering medications, ordering test(s), ordering procedure(s), obtaining a separately obtained history, and reviewing a separately obtained history.    Marlene Zamarripa CNM  2024  12:17 EDT      TRANSVAGINAL ULTRASOUND PRELIMINARY RESULT   2024  DIAGNOSIS: pelvic pain  UTERUS:  length 7.61 cm, volume: 67.588 cm3  ENDOMETRIAL LININ.36 cm   FIBROIDS/POLYPS: not seen. .   OVARIES: right - 2.48 x 2.85 x 2.08 cm, left - 3.20 x 2.64 x 3.39 cm.   OVARIAN CYSTS/MASSES: seen-left ovarian complex follicle  COMPARATIVE DATA: not available for examination  COMMENTS: uterus is midline, normal uterus with IUD appearing in cervix, right ovary appears normal, left ovarian complex follicle noted, free fluid noted in pelvis.   Marlene Zamarripa CNM  2024  12:17 EDT

## 2024-09-13 ENCOUNTER — SPECIALTY PHARMACY (OUTPATIENT)
Dept: NEUROLOGY | Facility: CLINIC | Age: 29
End: 2024-09-13
Payer: COMMERCIAL

## 2024-09-13 NOTE — PROGRESS NOTES
Specialty Pharmacy Patient Management Program  Neurology Reassessment     Sylvester Clarke is a 29 y.o. female with chronic migraine seen by a Neurology provider and enrolled in the Neurology Patient Management program offered by [facility].  A follow-up outreach was conducted, including assessment of continued therapy appropriateness, medication adherence, and side effect incidence and management for rimegepant (Nurtec).     Changes to Insurance Coverage or Financial Support  No changes.       Relevant Past Medical History and Comorbidities  Relevant medical history and concomitant health conditions were discussed with the patient. The patient's chart has been reviewed for relevant past medical history and comorbid health conditions and updated as necessary.   Past Medical History:   Diagnosis Date    Chlamydia 2016    COVID-19 2023    2022 10/2021-Vaxxed and Boosted    Gestational hypertension     Headache     History of  delivery 2024    @34 weeks    Migraine 2023    Diagnosed 3/2024     Social History     Socioeconomic History    Marital status: Single   Tobacco Use    Smoking status: Never     Passive exposure: Never   Vaping Use    Vaping status: Never Used   Substance and Sexual Activity    Alcohol use: Yes     Alcohol/week: 3.0 standard drinks of alcohol     Types: 3 Drinks containing 0.5 oz of alcohol per week    Drug use: Never    Sexual activity: Yes     Partners: Male     Birth control/protection: I.U.D.     Comment: 24     Problem list reviewed by Vlad Hernandez RP on 2024 at  9:27 AM      Hospitalizations and Urgent Care Since Last Assessment  ED Visits, Admissions, or Hospitalizations: None.   Urgent Office Visits: None.       Allergies  Known allergies and reactions were discussed with the patient. The patient's chart has been reviewed for allergy information and updated as necessary.   No Known Allergies  Allergies reviewed by  Vlad Hernandez, [facility] on 9/13/2024 at  9:27 AM      Relevant Laboratory Values  Common labs          1/3/2024    12:16 1/4/2024    08:52 2/13/2024    09:11   Common Labs   Glucose 100  92  83    BUN 9  9  6    Creatinine 0.67  0.65  0.89    Sodium 137  139  140    Potassium 4.0  4.0  3.4    Chloride 106  105  103    Calcium 8.4  8.6  9.7    Total Protein   7.2    Albumin 3.1  3.7  4.5    Total Bilirubin 0.2  0.3  0.6    Alkaline Phosphatase 113  127  103    AST (SGOT) 61  65  29    ALT (SGPT) 133  131  24    WBC 13.68  13.85  5.12    Hemoglobin 8.9  9.5  12.5    Hematocrit 26.7  28.5  37.9    Platelets 99  123  226    Total Cholesterol   205    Triglycerides   38    HDL Cholesterol   85    LDL Cholesterol    113        Lab Assessment  The above labs have been reviewed. No dose adjustments are needed for the specialty medication(s) based on the labs.       Current Medication List  This medication list has been reviewed with the patient and evaluated for any interactions or necessary modifications/recommendations, and updated to include all prescription medications, OTC medications, and supplements the patient is currently taking.  This list reflects what is contained in the patient's profile, which has also been marked as reviewed to communicate to other providers it is the most up to date version of the patient's current medication therapy.     Current Outpatient Medications:     etonogestrel-ethinyl estradiol (NuvaRing) 0.12-0.015 MG/24HR vaginal ring, Insert vaginally and leave in place for 3 consecutive weeks, then remove for 1 week., Disp: 3 each, Rfl: 12    fluconazole (DIFLUCAN) 150 MG tablet, Take 1 tablet by mouth Every 3 (Three) Days., Disp: 2 tablet, Rfl: 0    fluconazole (DIFLUCAN) 150 MG tablet, Take 1 tablet by mouth Every 3 (Three) Days., Disp: 2 tablet, Rfl: 0    labetalol (NORMODYNE) 300 MG tablet, Take 1 tablet by mouth 3 times a day. Hold for top blood pressure less than 100, bottom blood pressure  less than 60, or heart rate less than 50, Disp: 180 tablet, Rfl: 3    Levonorgestrel (Mirena, 52 MG,) 20 MCG/DAY intrauterine device IUD, To be inserted one time by prescriber. Route intrauterine., Disp: 1 each, Rfl: 0    prenatal vitamin (prenatal, CLASSIC, vitamin) tablet, Take  by mouth Daily., Disp: , Rfl:     Rimegepant Sulfate (Nurtec) 75 MG tablet dispersible tablet, Take 1 tablet by mouth 1 (One) Time As Needed for Migraine., Disp: 16 tablet, Rfl: 5    terconazole (TERAZOL 7) 0.4 % vaginal cream, Insert 1 applicator into the vagina Every Night for 5 days., Disp: 45 g, Rfl: 0    Medicines reviewed by Vlad Hernandez [facility] on 9/13/2024 at  9:27 AM    Drug Interactions  Fluconazole can increase levels of rimegepant and increase risk of rimegepant. Patient prescribed 4-day course of fluconazole and takes rimegepant as needed and uses very infrequently. She is aware to monitor for side-effects in needing to take rimegepant while on fluconazole and provided education to patient about minimum interval of 48 hours between rimegepant doses if needing to use while on fluconazole.       Adverse Drug Reactions  Medication tolerability: Tolerating with no to minimal ADRs  Medication plan: Continue therapy with normal follow-up  Plan for ADR Management: N/A, no ADR's      Adherence, Self-Administration, and Current Therapy Problems  Adherence related patient's specialty therapy was discussed with the patient. The Adherence segment of this outreach has been reviewed and updated.   Adherence Questions  Linked Medication(s) Assessed: Rimegepant Sulfate (PRN medication)  On average, how many doses/injections does the patient miss per month?: 0  What are the identified reasons for non-adherence or missed doses? : no problems identified  What is the estimated medication adherence level?: %  Based on the patient/caregiver response and refill history, does this patient require an MTP to track adherence improvements?:  no    Additional Barriers to Patient Self-Administration: No barriers.  Methods for Supporting Patient Self-Administration: No further support needed at this time.    Recently Close Medication Therapy Problems  No medication therapy recommendations to display  Open Medication Therapy Problems  No medication therapy recommendations to display     Goals of Therapy  Goals related to the patient's specialty therapy was discussed with the patient. The Patient Goals segment of this outreach has been reviewed and updated.    Goals Addressed Today        Specialty Pharmacy General Goal      Reduce severity. As of 3/6/24, patient rates most severe migraines as a 8-9/10 on a pain scale from 1-10 associated light and sound sensitivity along with nausea without vomiting. Migraines can last up to 3-4 days in duration. She has a constant daily headache which can get worse.    9/13/24: Patient reports she very infrequently requires use of rimegepant for acute treatment of migraines, 0-1 times per month. On its own, medication is about 50% effective in reducing migraine symptoms. When combined with icing her head, this is 100% effective the majority of the time. No side-effects or concerns.                Quality of Life Assessment   Quality of Life related to the patient's enrollment in the patient management program and services provided was discussed with the patient. The QOL segment of this outreach has been reviewed and updated.   Quality of Life Improvement Scale: 8-Moderately better      Reassessment Plan & Follow-Up  Medication Therapy Changes: No changes, continuing rimegepant for acute migraine treatment.   Related Plans, Therapy Recommendations, or Issues to Be Addressed: Nothing further to be addressed at this time.   Pharmacist to perform regular reassessments no more than (6) months from the previous assessment.  Care Coordinator to set up future refill outreaches, coordinate prescription delivery, and escalate  clinical questions to pharmacist.     Attestation  Therapeutic appropriateness: Appropriate  I attest the patient was actively involved in and has agreed to the above plan of care. If the prescribed therapy is at any point deemed not appropriate based on the current or future assessments, a consultation will be initiated with the patient's specialty care provider to determine the best course of action. The revised plan of therapy will be documented along with any additional patient education provided. Discussed aforementioned material with patient by phone.    Yolette YangD, [facility] Specialty Pharmacist, Neurology  9/13/2024  09:39 EDT

## 2024-09-26 ENCOUNTER — OFFICE VISIT (OUTPATIENT)
Dept: FAMILY MEDICINE CLINIC | Facility: CLINIC | Age: 29
End: 2024-09-26
Payer: COMMERCIAL

## 2024-09-26 VITALS
SYSTOLIC BLOOD PRESSURE: 104 MMHG | TEMPERATURE: 99.1 F | HEIGHT: 67 IN | HEART RATE: 84 BPM | WEIGHT: 175.2 LBS | OXYGEN SATURATION: 97 % | BODY MASS INDEX: 27.5 KG/M2 | DIASTOLIC BLOOD PRESSURE: 80 MMHG

## 2024-09-26 DIAGNOSIS — I10 PRIMARY HYPERTENSION: ICD-10-CM

## 2024-09-26 DIAGNOSIS — D50.9 IRON DEFICIENCY ANEMIA, UNSPECIFIED IRON DEFICIENCY ANEMIA TYPE: Primary | ICD-10-CM

## 2024-09-26 DIAGNOSIS — G43.019 INTRACTABLE MIGRAINE WITHOUT AURA AND WITHOUT STATUS MIGRAINOSUS: ICD-10-CM

## 2024-09-26 PROCEDURE — 99214 OFFICE O/P EST MOD 30 MIN: CPT | Performed by: NURSE PRACTITIONER

## 2024-09-26 RX ORDER — AMLODIPINE BESYLATE 5 MG/1
5 TABLET ORAL DAILY
Qty: 90 TABLET | Refills: 2 | Status: SHIPPED | OUTPATIENT
Start: 2024-09-26

## 2024-09-27 LAB
ALBUMIN SERPL-MCNC: 4.6 G/DL (ref 4–5)
ALP SERPL-CCNC: 113 IU/L (ref 44–121)
ALT SERPL-CCNC: 15 IU/L (ref 0–32)
AST SERPL-CCNC: 17 IU/L (ref 0–40)
BASOPHILS # BLD AUTO: 0 X10E3/UL (ref 0–0.2)
BASOPHILS NFR BLD AUTO: 0 %
BILIRUB SERPL-MCNC: 0.5 MG/DL (ref 0–1.2)
BUN SERPL-MCNC: 10 MG/DL (ref 6–20)
BUN/CREAT SERPL: 11 (ref 9–23)
CALCIUM SERPL-MCNC: 9.9 MG/DL (ref 8.7–10.2)
CHLORIDE SERPL-SCNC: 104 MMOL/L (ref 96–106)
CO2 SERPL-SCNC: 22 MMOL/L (ref 20–29)
CREAT SERPL-MCNC: 0.87 MG/DL (ref 0.57–1)
EGFRCR SERPLBLD CKD-EPI 2021: 94 ML/MIN/1.73
EOSINOPHIL # BLD AUTO: 0.1 X10E3/UL (ref 0–0.4)
EOSINOPHIL NFR BLD AUTO: 1 %
ERYTHROCYTE [DISTWIDTH] IN BLOOD BY AUTOMATED COUNT: 13.1 % (ref 11.7–15.4)
FERRITIN SERPL-MCNC: 48 NG/ML (ref 15–150)
GLOBULIN SER CALC-MCNC: 2.8 G/DL (ref 1.5–4.5)
GLUCOSE SERPL-MCNC: 90 MG/DL (ref 70–99)
HCT VFR BLD AUTO: 40.1 % (ref 34–46.6)
HGB BLD-MCNC: 12.7 G/DL (ref 11.1–15.9)
IMM GRANULOCYTES # BLD AUTO: 0 X10E3/UL (ref 0–0.1)
IMM GRANULOCYTES NFR BLD AUTO: 0 %
IRON SATN MFR SERPL: 34 % (ref 15–55)
IRON SERPL-MCNC: 126 UG/DL (ref 27–159)
LYMPHOCYTES # BLD AUTO: 1.9 X10E3/UL (ref 0.7–3.1)
LYMPHOCYTES NFR BLD AUTO: 33 %
MCH RBC QN AUTO: 27.4 PG (ref 26.6–33)
MCHC RBC AUTO-ENTMCNC: 31.7 G/DL (ref 31.5–35.7)
MCV RBC AUTO: 87 FL (ref 79–97)
MONOCYTES # BLD AUTO: 0.5 X10E3/UL (ref 0.1–0.9)
MONOCYTES NFR BLD AUTO: 8 %
NEUTROPHILS # BLD AUTO: 3.2 X10E3/UL (ref 1.4–7)
NEUTROPHILS NFR BLD AUTO: 58 %
PLATELET # BLD AUTO: 264 X10E3/UL (ref 150–450)
POTASSIUM SERPL-SCNC: 4.8 MMOL/L (ref 3.5–5.2)
PROT SERPL-MCNC: 7.4 G/DL (ref 6–8.5)
RBC # BLD AUTO: 4.63 X10E6/UL (ref 3.77–5.28)
SODIUM SERPL-SCNC: 138 MMOL/L (ref 134–144)
TIBC SERPL-MCNC: 376 UG/DL (ref 250–450)
UIBC SERPL-MCNC: 250 UG/DL (ref 131–425)
WBC # BLD AUTO: 5.7 X10E3/UL (ref 3.4–10.8)

## 2024-12-09 ENCOUNTER — OFFICE VISIT (OUTPATIENT)
Dept: NEUROLOGY | Facility: CLINIC | Age: 29
End: 2024-12-09
Payer: COMMERCIAL

## 2024-12-09 VITALS
BODY MASS INDEX: 27.94 KG/M2 | OXYGEN SATURATION: 98 % | DIASTOLIC BLOOD PRESSURE: 76 MMHG | HEART RATE: 83 BPM | HEIGHT: 67 IN | WEIGHT: 178 LBS | SYSTOLIC BLOOD PRESSURE: 118 MMHG

## 2024-12-09 DIAGNOSIS — G43.119 INTRACTABLE MIGRAINE WITH AURA WITHOUT STATUS MIGRAINOSUS: Primary | ICD-10-CM

## 2024-12-09 PROCEDURE — 99213 OFFICE O/P EST LOW 20 MIN: CPT | Performed by: PSYCHIATRY & NEUROLOGY

## 2024-12-09 RX ORDER — RIMEGEPANT SULFATE 75 MG/75MG
75 TABLET, ORALLY DISINTEGRATING ORAL EVERY OTHER DAY
Qty: 16 TABLET | Refills: 2 | Status: SHIPPED | OUTPATIENT
Start: 2024-12-09

## 2024-12-09 RX ORDER — LABETALOL 300 MG/1
300 TABLET, FILM COATED ORAL 3 TIMES DAILY
Qty: 270 TABLET | Refills: 2 | OUTPATIENT
Start: 2024-12-09

## 2024-12-09 NOTE — TELEPHONE ENCOUNTER
Refill not appropriate     Rx Refill Note  Requested Prescriptions     Refused Prescriptions Disp Refills    labetalol (NORMODYNE) 300 MG tablet [Pharmacy Med Name: LABETALOL  MG TABLET] 270 tablet 2     Sig: Take 1 tablet by mouth 3 times a day. Hold for top blood pressure less than 100, bottom blood pressure less than 60, or heart rate less than 50      Last office visit with prescribing clinician: 9/26/2024   Last telemedicine visit with prescribing clinician: Visit date not found   Next office visit with prescribing clinician: Visit date not found                         Would you like a call back once the refill request has been completed: [] Yes [] No    If the office needs to give you a call back, can they leave a voicemail: [] Yes [] No    Skyler Hager MA  12/09/24, 08:27 EST

## 2024-12-09 NOTE — ASSESSMENT & PLAN NOTE
29 year old left handed woman with primary HTN and migraines which has responded well to Nurtec ODT as needed but she would like assistance with prevention.  She reports a history of headaches starting 9-10 years ago.  Her headaches start on the right side and moves to involve her entire head which she describes as a dull pain which she rates as 8-9/10 on pain scale 1-10 when most severe with associated light and sound sensitivity along with nausea without vomiting.  The headaches can last up to 3-4 days in duration.  She is getting 2-3 headache days per week and 3-4 migraine days per week for which Nurtec ODT has been helpful.  She delivered her healthy baby.  She did have preeclampsia with the pregnancy and the headache at that time may have been related to the preeclampsia.  Of note she had brain MRI/MRV scan in 2023 which does not demonstrate any acute intracranial abnormalities.  No known family history of migraines.  She was on labetalol which has been switched to amlodipine most recently.  She was previously on IUD which was changed to NuvaRing and she tells me right around the time of the switch she started getting visual auras with the migraines including stars with the bad migraines.  I informed her that women of childbearing age with migraines with auras are at a potential increased risk for stroke and this risk is increased with estrogen containing birth control.  She is going to discuss with her OB/GYN with regards to switching to progesterone based birth control.  She has tried ibuprofen, Tylenol and magnesium oxide which is all she has tried.  She has not really noticed any triggers for her headaches.  She does not smoke.  She does have HTN so I do not recommend triptans.  I will change her Nurtec ODT to both prevention and acute treatment for further assistance.  Advised her not to get pregnant on this medicine.  Discussed migraine triggers and lifestyle modifications and provided patient education  information.

## 2024-12-09 NOTE — PROGRESS NOTES
Chief Complaint  Migraine (About 3-4 migraines a month, seeing stars with the bad migraines)    Subjective          Sylvester Clarke presents to [facility]UROLOG[facility] for   HISTORY OF PRESENT ILLNESS:    Sylvester Clarke is a 29 year old left handed woman who returns to neurology clinic for follow up evaluation and treatment of migraines which has responded well to Nurtec ODT as needed.  She reports a history of headaches starting 9-10 years ago.  Her headaches start on the right side and moves to involve her entire head which she describes as a dull pain which she rates as 8-9/10 on pain scale 1-10 when most severe with associated light and sound sensitivity along with nausea without vomiting.  The headaches can last up to 3-4 days in duration.  She is getting 2-3 headache days per week and 3-4 migraine days per week for which Nurtec ODT has been helpful.  She delivered her healthy baby.  She did have preeclampsia with the pregnancy and the headache at that time may have been related to the preeclampsia.  Of note she had brain MRI/MRV scan in 2023 which does not demonstrate any acute intracranial abnormalities.  No known family history of migraines.  She was on labetalol which has been switched to amlodipine most recently.  She was previously on IUD which was changed to NuvaRing and she tells me right around the time of the switch she started getting visual auras with the migraines including stars with the bad migraines.  I informed her that women of childbearing age with migraines with auras are at a potential increased risk for stroke and this risk is increased with estrogen containing birth control.  She has tried ibuprofen, Tylenol and magnesium oxide which is all she has tried.  She has not really noticed any triggers for her headaches.  She does not smoke.  She does have HTN so I do not recommend triptans.      Past Medical History:   Diagnosis Date    Chlamydia 2016 2023    COVID-19  "2023    2022 10/2021-Vaxxed and Boosted    Gestational hypertension     Headache     History of  delivery 2024    @34 weeks    Migraine 2023    Diagnosed 3/2024        Family History   Problem Relation Age of Onset    Other Father         Blood clots    Diabetes Sister     Diabetes Maternal Grandmother     Diabetes Maternal Grandfather     Thyroid cancer Paternal Grandmother         Social History     Socioeconomic History    Marital status: Single   Tobacco Use    Smoking status: Never     Passive exposure: Never    Smokeless tobacco: Never   Vaping Use    Vaping status: Never Used   Substance and Sexual Activity    Alcohol use: Yes     Alcohol/week: 1.0 standard drink of alcohol     Types: 1 Drinks containing 0.5 oz of alcohol per week    Drug use: Never    Sexual activity: Yes     Partners: Male     Birth control/protection: Other, Vaginal contraceptive ring     Comment: NuvaRing        I have reviewed and confirmed the accuracy of the ROS as documented by the MA/LPN/RN Wilfrido Gomez MD   Review of Systems   Eyes:  Positive for visual disturbance (seeing stars). Negative for blurred vision and double vision.   Gastrointestinal:  Positive for nausea. Negative for vomiting.   Musculoskeletal:  Negative for neck pain and neck stiffness.   Neurological:  Positive for headache. Negative for dizziness, tremors, seizures, syncope, facial asymmetry, speech difficulty, weakness, light-headedness, numbness, memory problem and confusion.   Psychiatric/Behavioral:  Negative for agitation, behavioral problems, decreased concentration, dysphoric mood, hallucinations, self-injury, sleep disturbance, suicidal ideas, negative for hyperactivity, depressed mood and stress. The patient is not nervous/anxious.         Objective   Vital Signs:   /76   Pulse 83   Ht 170.2 cm (67.01\")   Wt 80.7 kg (178 lb)   SpO2 98%   BMI 27.87 kg/m²       PHYSICAL EXAM:    General   Mental Status - Alert. General " Appearance - Well developed, Well groomed, Oriented and Cooperative. Orientation - Oriented X3.       Head and Neck  Head - normocephalic, atraumatic with no lesions or palpable masses.  Neck    Global Assessment - supple.       Eye   Sclera/Conjunctiva - Bilateral - Normal.    ENMT  Mouth and Throat   Oral Cavity/Oropharynx: Oropharynx - the soft palate,uvula and tongue are normal in appearance.    Chest and Lung Exam   Chest - lung clear to auscultation bilaterally.    Cardiovascular   Cardiovascular examination reveals  - normal heart sounds, regular rate and rhythm.    Neurologic   Mental Status: Speech - Normal. Cognitive function - appropriate fund of knowledge. No impairment of attention, Impairment of concentration, impairment of long term memory or impairment of short term memory.  Cranial Nerves:   II Optic: Visual acuity - Left - Normal. Right - Normal. Visual fields - Normal (to confrontation).  III Oculomotor: Pupillary constriction - Left - Normal. Right - Normal.  VII Facial: - Normal Bilaterally.   IX Glossopharyngeal / X Vagus - Normal.  XI Accessory: Trapezius - Bilateral - Normal. Sternocleidomastoid - Bilateral - Normal.  XII Hypoglossal - Bilateral - Normal.  Eye Movements: - Normal Bilaterally.  Sensory:   Light Touch: Intact - Globally.  Motor:   Bulk and Contour: - Normal.  Tone: - Normal.  Tremor: Not present.  Strength: 5/5 normal muscle strength - All Muscles.   General Assessment of Reflexes: - deep tendon reflexes are normal. Coordination - No Impairment of finger-to-nose or Impairment of rapid alternating movements. Gait - Normal.       Result Review :                 Assessment and Plan    Problem List Items Addressed This Visit       Intractable migraine with aura without status migrainosus - Primary    Current Assessment & Plan     29 year old left handed woman with primary HTN and migraines which has responded well to Nurtec ODT as needed but she would like assistance with  prevention.  She reports a history of headaches starting 9-10 years ago.  Her headaches start on the right side and moves to involve her entire head which she describes as a dull pain which she rates as 8-9/10 on pain scale 1-10 when most severe with associated light and sound sensitivity along with nausea without vomiting.  The headaches can last up to 3-4 days in duration.  She is getting 2-3 headache days per week and 3-4 migraine days per week for which Nurtec ODT has been helpful.  She delivered her healthy baby.  She did have preeclampsia with the pregnancy and the headache at that time may have been related to the preeclampsia.  Of note she had brain MRI/MRV scan in 2023 which does not demonstrate any acute intracranial abnormalities.  No known family history of migraines.  She was on labetalol which has been switched to amlodipine most recently.  She was previously on IUD which was changed to NuvaRing and she tells me right around the time of the switch she started getting visual auras with the migraines including stars with the bad migraines.  I informed her that women of childbearing age with migraines with auras are at a potential increased risk for stroke and this risk is increased with estrogen containing birth control.  She is going to discuss with her OB/GYN with regards to switching to progesterone based birth control.  She has tried ibuprofen, Tylenol and magnesium oxide which is all she has tried.  She has not really noticed any triggers for her headaches.  She does not smoke.  She does have HTN so I do not recommend triptans.  I will change her Nurtec ODT to both prevention and acute treatment for further assistance.  Advised her not to get pregnant on this medicine.  Discussed migraine triggers and lifestyle modifications and provided patient education information.          Relevant Medications    Rimegepant Sulfate (Nurtec) 75 MG tablet dispersible tablet     Follow Up   Return in about 3 months  (around 3/9/2025).  Patient was given instructions and counseling regarding her condition or for health maintenance advice. Please see specific information pulled into the AVS if appropriate.

## 2024-12-10 ENCOUNTER — SPECIALTY PHARMACY (OUTPATIENT)
Dept: NEUROLOGY | Facility: CLINIC | Age: 29
End: 2024-12-10
Payer: COMMERCIAL

## 2024-12-10 NOTE — PROGRESS NOTES
Specialty Pharmacy Refill Coordination Note     Sylvester is a 29 y.o. female contacted today regarding refills of her specialty medication(s).    Specialty medication(s) and dose(s) confirmed: rimegepant 75 mg by mouth every other day  Changes to medications: no  Changes to insurance: no      Refill Questions      Flowsheet Row Most Recent Value   Changes to allergies? No   Changes to medications? No   New conditions or infections since last clinic visit No   Unplanned office visit, urgent care, ED, or hospital admission in the last 4 weeks  No   How does patient/caregiver feel medication is working? Good   Financial problems or insurance changes  No   Since the previous refill, were any specialty medication doses or scheduled injections missed or delayed?  No   Does this patient require a clinical escalation to a pharmacist? No            Delivery Questions      Flowsheet Row Most Recent Value   Delivery method UPS   Delivery address verified with patient/caregiver? Yes   Delivery address Home   Number of medications in delivery 1   Medication(s) being filled and delivered Rimegepant Sulfate (Nurtec)   Doses left of specialty medications ~4   Copay verified? Yes   Copay amount $0   Copay form of payment No copayment ($0)   Ship Date 12/10/24   Delivery Date 12/11/24   Signature Required No                 Follow-up: 3 weeks     Vlad Hernandez RPH  12/10/2024   10:25 EST

## 2024-12-13 RX ORDER — ACETAMINOPHEN AND CODEINE PHOSPHATE 120; 12 MG/5ML; MG/5ML
1 SOLUTION ORAL DAILY
Qty: 28 TABLET | Refills: 3 | Status: SHIPPED | OUTPATIENT
Start: 2024-12-13 | End: 2025-12-13

## 2025-01-07 ENCOUNTER — SPECIALTY PHARMACY (OUTPATIENT)
Dept: NEUROLOGY | Facility: CLINIC | Age: 30
End: 2025-01-07
Payer: COMMERCIAL

## 2025-01-07 NOTE — PROGRESS NOTES
Specialty Pharmacy Refill Coordination Note     Sylvester is a 29 y.o. female contacted today regarding refills of Nurtec specialty medication(s).    Reviewed and verified with patient:       Specialty medication(s) and dose(s) confirmed: yes    Refill Questions      Flowsheet Row Most Recent Value   Changes to allergies? No   Changes to medications? No   New conditions or infections since last clinic visit No   Unplanned office visit, urgent care, ED, or hospital admission in the last 4 weeks  No   How does patient/caregiver feel medication is working? Very good   Financial problems or insurance changes  No   Since the previous refill, were any specialty medication doses or scheduled injections missed or delayed?  No   Does this patient require a clinical escalation to a pharmacist? No            Delivery Questions      Flowsheet Row Most Recent Value   Delivery method UPS   Delivery address verified with patient/caregiver? Yes   Delivery address Home   Number of medications in delivery 1   Medication(s) being filled and delivered Rimegepant Sulfate (Nurtec)   Doses left of specialty medications 5   Copay verified? Yes   Copay amount $0   Copay form of payment No copayment ($0)   Ship Date 1/8   Delivery Date 1/9   Signature Required No                   Follow-up: 21 day(s)     Reina Sharma, Pharmacy Technician  Specialty Pharmacy Technician

## 2025-02-04 ENCOUNTER — SPECIALTY PHARMACY (OUTPATIENT)
Dept: NEUROLOGY | Facility: CLINIC | Age: 30
End: 2025-02-04
Payer: COMMERCIAL

## 2025-02-04 NOTE — PROGRESS NOTES
Specialty Pharmacy Refill Coordination Note     Sylvester is a 29 y.o. female contacted today regarding refills of Nurtec specialty medication(s).    Reviewed and verified with patient:       Specialty medication(s) and dose(s) confirmed: yes    Refill Questions      Flowsheet Row Most Recent Value   Changes to allergies? No   Changes to medications? No   New conditions or infections since last clinic visit No   Unplanned office visit, urgent care, ED, or hospital admission in the last 4 weeks  No   How does patient/caregiver feel medication is working? Very good   Financial problems or insurance changes  No   Since the previous refill, were any specialty medication doses or scheduled injections missed or delayed?  No   Does this patient require a clinical escalation to a pharmacist? No            Delivery Questions      Flowsheet Row Most Recent Value   Delivery method UPS   Delivery address verified with patient/caregiver? Yes   Delivery address Home   Number of medications in delivery 1   Medication(s) being filled and delivered Rimegepant Sulfate (Nurtec)   Doses left of specialty medications 5   Copay verified? Yes   Copay amount $0   Copay form of payment No copayment ($0)   Ship Date 2/4   Delivery Date Selection 02/05/25   Signature Required No                   Follow-up: 21 day(s)     Reina Sharma, Pharmacy Technician  Specialty Pharmacy Technician

## 2025-03-06 RX ORDER — NORETHINDRONE 0.35 MG/1
1 TABLET ORAL DAILY
Qty: 84 TABLET | Refills: 0 | Status: SHIPPED | OUTPATIENT
Start: 2025-03-06

## 2025-03-10 ENCOUNTER — OFFICE VISIT (OUTPATIENT)
Dept: NEUROLOGY | Facility: CLINIC | Age: 30
End: 2025-03-10
Payer: COMMERCIAL

## 2025-03-10 ENCOUNTER — SPECIALTY PHARMACY (OUTPATIENT)
Dept: NEUROLOGY | Facility: CLINIC | Age: 30
End: 2025-03-10
Payer: COMMERCIAL

## 2025-03-10 VITALS
HEART RATE: 92 BPM | WEIGHT: 181 LBS | DIASTOLIC BLOOD PRESSURE: 88 MMHG | OXYGEN SATURATION: 97 % | HEIGHT: 67 IN | SYSTOLIC BLOOD PRESSURE: 128 MMHG | BODY MASS INDEX: 28.41 KG/M2

## 2025-03-10 DIAGNOSIS — G43.119 INTRACTABLE MIGRAINE WITH AURA WITHOUT STATUS MIGRAINOSUS: ICD-10-CM

## 2025-03-10 DIAGNOSIS — G43.119 INTRACTABLE MIGRAINE WITH AURA WITHOUT STATUS MIGRAINOSUS: Primary | ICD-10-CM

## 2025-03-10 PROCEDURE — 99214 OFFICE O/P EST MOD 30 MIN: CPT | Performed by: PSYCHIATRY & NEUROLOGY

## 2025-03-10 PROCEDURE — 96372 THER/PROPH/DIAG INJ SC/IM: CPT | Performed by: PSYCHIATRY & NEUROLOGY

## 2025-03-10 RX ORDER — FREMANEZUMAB-VFRM 225 MG/1.5ML
225 INJECTION SUBCUTANEOUS
Qty: 1.68 ML | Refills: 2 | Status: SHIPPED | OUTPATIENT
Start: 2025-03-10 | End: 2025-03-10 | Stop reason: SDUPTHER

## 2025-03-10 RX ORDER — KETOROLAC TROMETHAMINE 30 MG/ML
30 INJECTION, SOLUTION INTRAMUSCULAR; INTRAVENOUS EVERY 6 HOURS PRN
Status: SHIPPED | OUTPATIENT
Start: 2025-03-10 | End: 2025-03-15

## 2025-03-10 RX ORDER — FREMANEZUMAB-VFRM 225 MG/1.5ML
225 INJECTION SUBCUTANEOUS
Qty: 4.5 ML | Refills: 0 | Status: SHIPPED | OUTPATIENT
Start: 2025-03-10

## 2025-03-10 RX ORDER — FREMANEZUMAB-VFRM 225 MG/1.5ML
225 INJECTION SUBCUTANEOUS
Qty: 1.68 ML | Refills: 2 | Status: SHIPPED | OUTPATIENT
Start: 2025-03-10 | End: 2025-03-10

## 2025-03-10 RX ADMIN — KETOROLAC TROMETHAMINE 30 MG: 30 INJECTION, SOLUTION INTRAMUSCULAR; INTRAVENOUS at 10:13

## 2025-03-10 NOTE — ASSESSMENT & PLAN NOTE
29 year old left handed woman with intractable migraines which had responded well to Nurtec ODT every other day but her insurance has denied this medicine and opted to try her on a different medication, patient has a migraine today.  She reports a history of headaches starting 10+ years ago.  Her headaches start on the right side and moves to involve her entire head which she describes as a dull pain which she rates as 8-9/10 on pain scale 1-10 when most severe with associated light and sound sensitivity along with nausea without vomiting.  The headaches can last up to 3-4 days in duration.  She was getting 2-3 headache days per week and 3-4 migraine days per week for which Nurtec ODT has been helpful and is currently getting 1-2 (if she remembers to take the medicine as prescribed) and if not up to 2-3 migraines per month.  She delivered her healthy baby.  She did have preeclampsia with the pregnancy and the headache at that time may have been related to the preeclampsia.  Of note she had brain MRI/MRV scan in 2023 which does not demonstrate any acute intracranial abnormalities.  No known family history of migraines.  She was on labetalol which has been switched to amlodipine more recently.  She was previously on IUD which was changed to NuvaRing and more recently to norethindrone OCP.  I informed her that women of childbearing age with migraines with auras are at a potential increased risk for stroke and this risk is increased with estrogen containing birth control.  She has tried ibuprofen, Tylenol and magnesium oxide which is all she has tried.  She has not really noticed any triggers for her headaches.  She does not smoke.  She does have HTN so I do not recommend triptans.  She has a migraine today and would like further assistance.  We will provide her with a Toradol injection today.  I will discontinue the Nurtec ODT and instead start her on Ubrelvy for acute treatment and also start her on Ajovy for migraine  prevention.  I informed her not to get pregnant for at least 6 months of being off of the Ajovy if she does decided to try getting pregnant in the future.  Discussed migraine triggers and lifestyle modifications and provided patient education information today.

## 2025-03-10 NOTE — PROGRESS NOTES
Chief Complaint  Migraine (About 2-3 a month - Currently has one, took Nurtec this morning)    Subjective          Sylvester Clarke presents to [facility]UROLOG[facility] for   HISTORY OF PRESENT ILLNESS:    Sylvester Clarke is a 29 year old left handed woman who returns to neurology clinic for follow up evaluation and treatment of migraines which has responded well to Nurtec ODT every other day but her insurance has denied this medicine and opted to try her on a different medication, patient has a migraine today.  She reports a history of headaches starting 10+ years ago.  Her headaches start on the right side and moves to involve her entire head which she describes as a dull pain which she rates as 8-9/10 on pain scale 1-10 when most severe with associated light and sound sensitivity along with nausea without vomiting.  The headaches can last up to 3-4 days in duration.  She was getting 2-3 headache days per week and 3-4 migraine days per week for which Nurtec ODT has been helpful and is currently getting 1-2 (if she remembers to take the medicine as prescribed) and if not up to 2-3 migraines per month.  She delivered her healthy baby.  She did have preeclampsia with the pregnancy and the headache at that time may have been related to the preeclampsia.  Of note she had brain MRI/MRV scan in 2023 which does not demonstrate any acute intracranial abnormalities.  No known family history of migraines.  She was on labetalol which has been switched to amlodipine more recently.  She was previously on IUD which was changed to NuvaRing and more recently to norethindrone OCP.  I informed her that women of childbearing age with migraines with auras are at a potential increased risk for stroke and this risk is increased with estrogen containing birth control.  She has tried ibuprofen, Tylenol and magnesium oxide which is all she has tried.  She has not really noticed any triggers for her headaches.  She does not  smoke.  She does have HTN so I do not recommend triptans.  She has a migraine today and would like further assistance.      Past Medical History:   Diagnosis Date    Chlamydia 2016    COVID-19 2023    2022 10/2021-Vaxxed and Boosted    Gestational hypertension     Headache     History of  delivery 2024    @34 weeks    Migraine 2023    Diagnosed 3/2024    Preeclampsia 2023    Urinary tract infection 2022        Family History   Problem Relation Age of Onset    Hyperlipidemia Father     Hypertension Father     Pulmonary embolism Father     Diabetes Sister     Asthma Sister     Diabetes Maternal Grandmother     Hyperlipidemia Maternal Grandmother     Hypertension Maternal Grandmother     Diabetes Maternal Grandfather     Thyroid cancer Paternal Grandmother     Cancer Paternal Grandmother     Diabetes Paternal Grandfather     Diabetes Sister         Social History     Socioeconomic History    Marital status: Single   Tobacco Use    Smoking status: Never     Passive exposure: Never    Smokeless tobacco: Never   Vaping Use    Vaping status: Never Used   Substance and Sexual Activity    Alcohol use: Yes     Alcohol/week: 1.0 standard drink of alcohol     Types: 1 Drinks containing 0.5 oz of alcohol per week    Drug use: Never    Sexual activity: Yes     Partners: Male     Birth control/protection: Birth control pill     Comment: Gabby        I have reviewed and confirmed the accuracy of the ROS as documented by the MA/LPN/RN Wilfrido Gomez MD   Review of Systems   Neurological:  Positive for headache. Negative for dizziness, tremors, seizures, syncope, facial asymmetry, speech difficulty, weakness, light-headedness, numbness, memory problem and confusion.   Psychiatric/Behavioral:  Negative for agitation, behavioral problems, decreased concentration, dysphoric mood, hallucinations, self-injury, sleep disturbance, suicidal ideas, negative for hyperactivity, depressed mood and stress.  "The patient is not nervous/anxious.         Objective   Vital Signs:   /88   Pulse 92   Ht 170.2 cm (67.01\")   Wt 82.1 kg (181 lb)   SpO2 97%   BMI 28.34 kg/m²       PHYSICAL EXAM:    General   Mental Status - Alert. General Appearance - Well developed, Well groomed, Oriented and Cooperative. Orientation - Oriented X3. In slight distress due to current migraine.         Head and Neck  Head - normocephalic, atraumatic with no lesions or palpable masses.  Neck    Global Assessment - supple.       Eye   Sclera/Conjunctiva - Bilateral - Normal.    ENMT  Mouth and Throat   Oral Cavity/Oropharynx: Oropharynx - the soft palate,uvula and tongue are normal in appearance.    Chest and Lung Exam   Chest - lung clear to auscultation bilaterally.    Cardiovascular   Cardiovascular examination reveals  - normal heart sounds, regular rate and rhythm.    Neurologic   Mental Status: Speech - Normal. Cognitive function - appropriate fund of knowledge. No impairment of attention, Impairment of concentration, impairment of long term memory or impairment of short term memory.  Cranial Nerves:   II Optic: Visual acuity - Left - Normal. Right - Normal. Visual fields - Normal (to confrontation).  III Oculomotor: Pupillary constriction - Left - Normal. Right - Normal.  VII Facial: - Normal Bilaterally.   IX Glossopharyngeal / X Vagus - Normal.  XI Accessory: Trapezius - Bilateral - Normal. Sternocleidomastoid - Bilateral - Normal.  XII Hypoglossal - Bilateral - Normal.  Eye Movements: - Normal Bilaterally.  Sensory:   Light Touch: Intact - Globally.  Motor:   Bulk and Contour: - Normal.  Tone: - Normal.  Tremor: Not present.  Strength: 5/5 normal muscle strength - All Muscles.   General Assessment of Reflexes: - deep tendon reflexes are normal. Coordination - No Impairment of finger-to-nose or Impairment of rapid alternating movements. Gait - Normal.       Result Review :                 Assessment and Plan    Problem List Items " Addressed This Visit       Intractable migraine with aura without status migrainosus - Primary    Current Assessment & Plan   29 year old left handed woman with intractable migraines which had responded well to Nurtec ODT every other day but her insurance has denied this medicine and opted to try her on a different medication, patient has a migraine today.  She reports a history of headaches starting 10+ years ago.  Her headaches start on the right side and moves to involve her entire head which she describes as a dull pain which she rates as 8-9/10 on pain scale 1-10 when most severe with associated light and sound sensitivity along with nausea without vomiting.  The headaches can last up to 3-4 days in duration.  She was getting 2-3 headache days per week and 3-4 migraine days per week for which Nurtec ODT has been helpful and is currently getting 1-2 (if she remembers to take the medicine as prescribed) and if not up to 2-3 migraines per month.  She delivered her healthy baby.  She did have preeclampsia with the pregnancy and the headache at that time may have been related to the preeclampsia.  Of note she had brain MRI/MRV scan in 2023 which does not demonstrate any acute intracranial abnormalities.  No known family history of migraines.  She was on labetalol which has been switched to amlodipine more recently.  She was previously on IUD which was changed to NuvaRing and more recently to norethindrone OCP.  I informed her that women of childbearing age with migraines with auras are at a potential increased risk for stroke and this risk is increased with estrogen containing birth control.  She has tried ibuprofen, Tylenol and magnesium oxide which is all she has tried.  She has not really noticed any triggers for her headaches.  She does not smoke.  She does have HTN so I do not recommend triptans.  She has a migraine today and would like further assistance.  We will provide her with a Toradol injection today.  I  will discontinue the Nurtec ODT and instead start her on Ubrelvy for acute treatment and also start her on Ajovy for migraine prevention.  I informed her not to get pregnant for at least 6 months of being off of the Ajovy if she does decided to try getting pregnant in the future.  Discussed migraine triggers and lifestyle modifications and provided patient education information today.           Relevant Medications    ketorolac (TORADOL) injection 30 mg    Fremanezumab-vfrm (Ajovy) 225 MG/1.5ML solution auto-injector    ubrogepant (Ubrelvy) 100 MG tablet       I spent 32 minutes caring for Sylvester on this date of service. This time includes time spent by me in the following activities:preparing for the visit, reviewing tests, obtaining and/or reviewing a separately obtained history, performing a medically appropriate examination and/or evaluation , counseling and educating the patient/family/caregiver, ordering medications, tests, or procedures, documenting information in the medical record, and care coordination    Follow Up   No follow-ups on file.  Patient was given instructions and counseling regarding her condition or for health maintenance advice. Please see specific information pulled into the AVS if appropriate.

## 2025-03-10 NOTE — PROGRESS NOTES
Specialty Pharmacy Patient Management Program  Neurology Initial Assessment     Sylvester Clarke is a 29 y.o. female with chronic migraine seen by a Neurology provider and enrolled in the Neurology Patient Management program offered by [facility].  An initial outreach was conducted, including assessment of therapy appropriateness and specialty medication education for fremanezumab (Ajovy) and ubrogepant (Ubrelvy). The patient was introduced to services offered by [facility], including: regular assessments, refill coordination, curbside pick-up or mail order delivery options, prior authorization maintenance, and financial assistance programs as applicable. The patient was also provided with contact information for the pharmacy team.     Insurance Coverage & Financial Support  Rx [employer] [facility] [employer] and copay cards.    Relevant Past Medical History and Comorbidities  Relevant medical history and concomitant health conditions were discussed with the patient. The patient's chart has been reviewed for relevant past medical history and comorbid health conditions and updated as necessary.   Past Medical History:   Diagnosis Date    Chlamydia 2016    COVID-19 2023    2022 10/2021-Vaxxed and Boosted    Gestational hypertension     Headache     History of  delivery 2024    @34 weeks    Migraine 2023    Diagnosed 3/2024    Preeclampsia 2023    Urinary tract infection 2022     Social History     Socioeconomic History    Marital status: Single   Tobacco Use    Smoking status: Never     Passive exposure: Never    Smokeless tobacco: Never   Vaping Use    Vaping status: Never Used   Substance and Sexual Activity    Alcohol use: Yes     Alcohol/week: 1.0 standard drink of alcohol     Types: 1 Drinks containing 0.5 oz of alcohol per week    Drug use: Never    Sexual activity: Yes     Partners: Male     Birth control/protection: Birth control pill      Comment: Jencycla     Problem list reviewed by Vlad Hernandez RPH on 3/10/2025 at  3:17 PM      Allergies  Known allergies and reactions were discussed with the patient. The patient's chart has been reviewed for  allergy information and updated as necessary.   No Known Allergies  Allergies reviewed by Vlad Hernandez RPH on 3/10/2025 at  3:17 PM      Relevant Laboratory Values  Common labs          9/26/2024    09:55   Common Labs   Glucose 90    BUN 10    Creatinine 0.87    Sodium 138    Potassium 4.8    Chloride 104    Calcium 9.9    Albumin 4.6    Total Bilirubin 0.5    Alkaline Phosphatase 113    AST (SGOT) 17    ALT (SGPT) 15    WBC 5.7    Hemoglobin 12.7    Hematocrit 40.1    Platelets 264        Lab Assessment  The above labs have been reviewed. No dose adjustments are needed for the specialty medication(s) based on the labs.       Current Medication List  This medication list has been reviewed with the patient and evaluated for any interactions or necessary modifications/recommendations, and updated to include all prescription medications, OTC medications, and supplements the patient is currently taking.  This list reflects what is contained in the patient's profile, which has also been marked as reviewed to communicate to other providers it is the most up to date version of the patient's current medication therapy.     Current Outpatient Medications:     Fremanezumab-vfrm (Ajovy) 225 MG/1.5ML solution auto-injector, Inject 225 mg under the skin into the appropriate area as directed Every 30 (Thirty) Days., Disp: 4.5 mL, Rfl: 0    amLODIPine (NORVASC) 5 MG tablet, Take 1 tablet by mouth Daily., Disp: 90 tablet, Rfl: 2    norethindrone (Jencycla) 0.35 MG tablet, TAKE 1 TABLET BY MOUTH EVERY DAY, Disp: 84 tablet, Rfl: 0    prenatal vitamin (prenatal, CLASSIC, vitamin) tablet, Take  by mouth Daily., Disp: , Rfl:     ubrogepant (Ubrelvy) 100 MG tablet, Take 1 tablet by mouth 1 (One) Time As Needed  (migraine). Can repeat 2nd dose in 2 hours if needed but no more than 2 doses in 24 hours., Disp: 10 tablet, Rfl: 2    Current Facility-Administered Medications:     ketorolac (TORADOL) injection 30 mg, 30 mg, Intramuscular, Q6H PRN, Wilfrido Gomez MD, 30 mg at 03/10/25 1013    Medicines reviewed by Vlad Hernandez [facility] on 3/10/2025 at  3:17 PM    Drug Interactions  None.       Initial Education Provided for Specialty Medication  The patient has been provided with the following education and any applicable administration techniques (i.e. self-injection) have been demonstrated for the therapies indicated. All questions and concerns have been addressed prior to the patient receiving the medication, and the patient has verbalized understanding of the education and any materials provided.  Additional patient education shall be provided and documented upon request by the patient, provider or payer.      Ajovy (fremanezumab-vfrm) 225 mg subcutaneous every 30 days     Medication Expectations   Why am I taking this medication? You are taking this medication for migraine prophylaxis.   What should I expect while on this medication? You should expect to a decrease in the frequency and severity of your migraines.   How does the medication work? Ajovy is a monoclonal antibody that binds to calcitonin gene-related peptide (CGRP) and blocks its binding to the receptor decreasing the severity of migraines.   How long will I be on this medication for? The amount of time you will be on this medication will be determined by your doctor and your response to the medication.    How do I take this medication? Take as directed on your prescription label. This medication is a self-injection given every 30 days.    What are some possible side effects? Injection site reactions and hypersensitivity reactions.   What happens if I miss a dose? If you miss a dose, take it as soon as you remember, and time next dose 30 days from last dose.        Medication Safety   What are things I should warn my doctor immediately about? Cases of anaphylaxis and angioedema have been reported in the postmarketing setting. If a reaction occurs, notify your doctor immediately.   What are things that I should be cautious of? Injection site reaction and hypersensitivity reactions, including rash, pruritus, drug hypersensitivity, and urticaria   What are some medications that can interact with this one? No drug interactions identified.      Medication Storage/Handling   How should I handle this medication? Keep this medication our of reach of pets/children in original container.  On the day your Ajovy is due let it set at room temperature for 30 minutes prior to injection. (do NOT warm using a heat source such as hot water or a microwave).  Administer in the abdomen, thigh, back of the upper arm, or buttocks.  Do not inject where the skin is tender, bruised, red or hard.  Rotate injection sites.   How does this medication need to be stored? Store in refrigerator and keep dry.   How should I dispose of this medication? You can dispose of the empty syringe in a sharps container, and if this is not available you may use an empty hard plastic container such as a milk jug or tide container.      Resources/Support   How can I remind myself to take this medication? You can download a reminder vance on your phone or use a calandar  to help with your monthly injection.   Is financial support available?  Yes, [employer] can provide co-pay cards if you have commercial insurance or patient assistance if you have Medicare or no insurance.    Which vaccines are recommended for me? Talk to your doctor about these vaccines: Flu, Coronavirus (COVID-19), Pneumococcal (pneumonia), Tdap, Hepatitis B, Zoster (shingles)                 Ubrelvy (Ubrogepant) 100 mg tablet, take 1 tablet by mouth as needed for headache symptoms. May repeat 1 tablet in 2 hours for continued symptoms. Max of 2  tablets over 24 hours.   Medication Expectations   Why am I taking this medication? You are taking this medication to treat acute migraines.   What should I expect while on this medication? You should expect to see a decrease in the frequency and severity of your migraines.   How does the medication work? Ubrelvy is a small molecule that binds to calcitonin gene-related peptide (CGRP) and blocks its binding to the receptor decreasing the severity of migraines.   How long will I be on this medication for? The amount of time you will be on this medication will be determined by your doctor and your response to the medication.    How do I take this medication? Take as directed on your prescription label. Reviewed plan for ubrogepant 100 mg (1 tablet) PO daily PRN; may repeat 1 tablet in 2 hours, if needed. Max dose per day is 200 mg over 24 hours.    What are some possible side effects? Potential side effects including, but not limited to nausea and somnolence. Patient verbalized understanding.   What happens if I miss a dose? This is taken as needed for migraines.     Medication Safety   What are things I should warn my doctor immediately about? Allergic reaction: Itching or hives, swelling in your face or hands, swelling or tingling in your mouth or throat, chest tightness, trouble breathing     What are things that I should be cautious of? Tell your doctor if you are pregnant or breastfeeding, or if you have kidney disease or liver disease.   What are some medications that can interact with this one? Concomitant use of strong CY inhibitors, check with your pharmacist or provider before starting any new medications, avoid grapefruit juice.     Medication Storage/Handling   How should I handle this medication? Keep this medication out of reach of pets/children.   How does this medication need to be stored? Store at a controlled room temperature between 20 and 25 degrees C (68 and 77 degrees F), with excursions  permitted between 15 and 30 degrees C (59 and 86 degrees F) away from direct sunlight and moisture.   How should I dispose of this medication? There should not be a need to dispose of this medication unless your provider decides to change the dose or therapy. If that is the case, take to your local police station for proper disposal. Some pharmacies also have take-back bins for medication drop-off.      Resources/Support   How can I remind myself to take this medication? This is taken as needed for migraines.   Is financial support available?  Yes, [employer] can provide co-pay cards if you have commercial insurance or patient assistance if you have Medicare or no insurance.    Which vaccines are recommended for me? Talk to your doctor about these vaccines: Flu, Coronavirus (COVID-19), Pneumococcal (pneumonia), Tdap, Hepatitis B, Zoster (shingles)          Adherence and Self-Administration  Adherence related to the patient's specialty therapy was discussed with the patient. The Adherence segment of this outreach has been reviewed and updated.   Is there a concern with patient's ability to self administer the medication correctly and without issue?: No  Were any potential barriers to adherence identified during the initial assessment or patient education?: No  Are there any concerns regarding the patient's understanding of the importance of medication adherence?: No  Methods for Supporting Patient Adherence and/or Self-Administration: no further support needed at this time.      Goals of Therapy  Goals related to the patient's specialty therapy were discussed with the patient. The Patient Goals segment of this outreach has been reviewed and updated.   Goals Addressed Today        Specialty Pharmacy General Goal      Reduce severity. As of 3/6/24, patient rates most severe migraines as a 8-9/10 on a pain scale from 1-10 associated light and sound sensitivity along with nausea without vomiting. Migraines  can last up to 3-4 days in duration. She has a constant daily headache which can get worse.    3/10/25: Patient changing from rimegepant for prevention and acute treatment to fremanezumab for prevention and ubrogepant for acute migraine treatment per insurance restrictions. With rimegepant, patient has been experiencing about 1-2 migraines per month with over 50% reduction in migraine severity and symptoms.     9/13/24: Patient reports she very infrequently requires use of rimegepant for acute treatment of migraines, 0-1 times per month. On its own, medication is about 50% effective in reducing migraine symptoms. When combined with icing her head, this is 100% effective the majority of the time. No side-effects or concerns.                  Reassessment Plan & Follow-Up  Medication Therapy Changes: Stopping rimegepant and starting fremanezumab for prevention and ubrogepant for acute treatment of migraines per patient's neurologist due to insurance restrictions.  Related Plans, Therapy Recommendations, or Therapy Problems to Be Addressed: Nothing further to be addressed at this time.   Pharmacist to perform regular reassessments no more than (6) months from the previous assessment.  Care Coordinator to set up future refill outreaches, coordinate prescription delivery, and escalate clinical questions to pharmacist.   Welcome information and patient satisfaction survey to be sent by specialty pharmacy team with patient's initial fill.    Attestation  Therapeutic appropriateness: Appropriate   I attest the patient was actively involved in and has agreed to the above plan of care. If the prescribed therapy is at any point deemed not appropriate based on the current or future assessments, a consultation will be initiated with the patient's specialty care provider to determine the best course of action. The revised plan of therapy will be documented along with any additional patient education provided. Discussed  aforementioned material with patient in person.    Vlad Hernandez [facility]  3/10/2025  15:20 EDT

## 2025-03-31 ENCOUNTER — OFFICE VISIT (OUTPATIENT)
Dept: OBSTETRICS AND GYNECOLOGY | Facility: CLINIC | Age: 30
End: 2025-03-31
Payer: COMMERCIAL

## 2025-03-31 VITALS
HEIGHT: 67 IN | WEIGHT: 182 LBS | SYSTOLIC BLOOD PRESSURE: 118 MMHG | BODY MASS INDEX: 28.56 KG/M2 | DIASTOLIC BLOOD PRESSURE: 80 MMHG

## 2025-03-31 DIAGNOSIS — Z01.419 VISIT FOR GYNECOLOGIC EXAMINATION: Primary | ICD-10-CM

## 2025-03-31 PROCEDURE — 99459 PELVIC EXAMINATION: CPT | Performed by: OBSTETRICS & GYNECOLOGY

## 2025-03-31 PROCEDURE — 99395 PREV VISIT EST AGE 18-39: CPT | Performed by: OBSTETRICS & GYNECOLOGY

## 2025-03-31 RX ORDER — ACETAMINOPHEN AND CODEINE PHOSPHATE 120; 12 MG/5ML; MG/5ML
1 SOLUTION ORAL DAILY
Qty: 84 TABLET | Refills: 3 | Status: SHIPPED | OUTPATIENT
Start: 2025-03-31

## 2025-03-31 NOTE — PROGRESS NOTES
[facility]  [address], [address]  [place], [place] [address]  Phone: [phone] / Fax:  [phone]      2025    [facility] [address]    Tiara Cain APRN    Chief Complaint   Patient presents with    Gynecologic Exam     Annual Exam, last pap 2-5-24-NL.  Patient on Jencycla for contraception and has irregular bleeding.  In addition, when bleeding does not occur, she reports yeast infection.       Sylvester Clarke is here for annual gynecologic exam.  HPI - Patient with normal pap one year ago.  She is on Jencycla for birth control; this was started in December.  She reports irregular bleeding since starting this.  She has a one week period and then has spotting for 2 weeks.  She has one week with no bleeding and typically develops symptoms of a yeast infection.  She treats with Diflucan.  She has a history of Migraine with aura and cannot take estrogen contraception.    Past Medical History:   Diagnosis Date    Chlamydia 2016    COVID-19 2023    2022 10/2021-Vaxxed and Boosted. Patient reports havinf Covid x5, most recent 2024    History of  delivery 2024    @34 weeks    Migraine 2023    Diagnosed 3/2024    Preeclampsia 2023    Urinary tract infection 2022       Past Surgical History:   Procedure Laterality Date    CYSTECTOMY  2015    Lower Back    FINGER SURGERY Left     Pinky    WISDOM TOOTH EXTRACTION  2015       No Known Allergies    Social History     Socioeconomic History    Marital status: Single   Tobacco Use    Smoking status: Never     Passive exposure: Never    Smokeless tobacco: Never   Vaping Use    Vaping status: Never Used   Substance and Sexual Activity    Alcohol use: Yes     Alcohol/week: 1.0 standard drink of alcohol     Types: 1 Drinks containing 0.5 oz of alcohol per week    Drug use: Never    Sexual activity: Yes     Partners: Male     Birth control/protection: Birth control pill       Family History  "  Problem Relation Age of Onset    Hyperlipidemia Father     Hypertension Father     Pulmonary embolism Father     Heart attack Father     No Known Problems Mother     No Known Problems Brother     No Known Problems Brother     Asthma Sister     Diabetes Sister         Type II    No Known Problems Sister     Seizures Sister     No Known Problems Sister     Diabetes Paternal Grandfather     Thyroid cancer Paternal Grandmother     Cancer Paternal Grandmother     Diabetes Maternal Grandmother     Hyperlipidemia Maternal Grandmother     Hypertension Maternal Grandmother     Diabetes Maternal Grandfather     Breast cancer Neg Hx     Ovarian cancer Neg Hx     Uterine cancer Neg Hx     Colon cancer Neg Hx        Patient's last menstrual period was 2025 (exact date).    OB History          1    Para   1    Term   0       1    AB   0    Living   1         SAB   0    IAB   0    Ectopic   0    Molar   0    Multiple   0    Live Births   1                Vitals:    25 1044   BP: 118/80   Weight: 82.6 kg (182 lb)   Height: 170.2 cm (67\")       Physical Exam  Constitutional:       Appearance: Normal appearance. She is well-developed.   Genitourinary:      Bladder and urethral meatus normal.      Right Labia: No tenderness or lesions.     Left Labia: No tenderness or lesions.     No vaginal discharge or tenderness.        Right Adnexa: not tender and not full.     Left Adnexa: not tender and not full.     No cervical motion tenderness or lesion.      Uterus is not enlarged or tender.      No urethral tenderness or hypermobility present.   Breasts:     Right: No mass or nipple discharge.      Left: No mass or nipple discharge.   HENT:      Right Ear: External ear normal.      Left Ear: External ear normal.      Nose: Nose normal.   Eyes:      Conjunctiva/sclera: Conjunctivae normal.   Neck:      Thyroid: No thyromegaly.   Cardiovascular:      Rate and Rhythm: Normal rate and regular rhythm.      Heart " sounds: Normal heart sounds.   Pulmonary:      Effort: Pulmonary effort is normal.      Breath sounds: No stridor. No wheezing.   Abdominal:      Palpations: Abdomen is soft.      Tenderness: There is no abdominal tenderness. There is no guarding or rebound.   Musculoskeletal:         General: Normal range of motion.      Cervical back: Normal range of motion and neck supple.   Neurological:      Mental Status: She is alert.      Coordination: Coordination normal.   Skin:     General: Skin is warm and dry.   Psychiatric:         Mood and Affect: Mood normal.         Behavior: Behavior normal.         Thought Content: Thought content normal.         Judgment: Judgment normal.   Vitals reviewed. Exam conducted with a chaperone present.         Diagnoses and all orders for this visit:    1. Visit for gynecologic examination (Primary)  -     norethindrone (Jencycla) 0.35 MG tablet; Take 1 tablet by mouth Daily.  Dispense: 84 tablet; Refill: 3  -     Discussed importance of regular screening and breast awareness.  -     Discussed pre-eclampsia prevention/preconception counseling.  Pre-eclampsia prevention would include optimizing blood pressure and taking baby aspirin.    -     Discussed using anti-fungal once per week for prophylaxis for yeast infection.  -     Discussed options for contraception that are non-estrogen based, including Depo Provera.  She tried Progestin-IUD and had expulsion despite placement under sonogram.        Rusty Seals MD

## 2025-04-08 ENCOUNTER — HOSPITAL ENCOUNTER (EMERGENCY)
Facility: HOSPITAL | Age: 30
Discharge: HOME | End: 2025-04-08
Attending: STUDENT IN AN ORGANIZED HEALTH CARE EDUCATION/TRAINING PROGRAM | Admitting: STUDENT IN AN ORGANIZED HEALTH CARE EDUCATION/TRAINING PROGRAM
Payer: COMMERCIAL

## 2025-04-08 ENCOUNTER — OFFICE VISIT (OUTPATIENT)
Dept: FAMILY MEDICINE CLINIC | Facility: CLINIC | Age: 30
End: 2025-04-08
Payer: COMMERCIAL

## 2025-04-08 ENCOUNTER — TELEPHONE (OUTPATIENT)
Dept: FAMILY MEDICINE CLINIC | Facility: CLINIC | Age: 30
End: 2025-04-08

## 2025-04-08 VITALS
TEMPERATURE: 97.1 F | HEIGHT: 67 IN | WEIGHT: 185 LBS | HEART RATE: 94 BPM | SYSTOLIC BLOOD PRESSURE: 118 MMHG | BODY MASS INDEX: 29.03 KG/M2 | DIASTOLIC BLOOD PRESSURE: 70 MMHG | OXYGEN SATURATION: 98 %

## 2025-04-08 VITALS
SYSTOLIC BLOOD PRESSURE: 140 MMHG | HEIGHT: 67 IN | DIASTOLIC BLOOD PRESSURE: 79 MMHG | WEIGHT: 184.97 LBS | RESPIRATION RATE: 16 BRPM | TEMPERATURE: 97.5 F | BODY MASS INDEX: 29.03 KG/M2 | HEART RATE: 71 BPM | OXYGEN SATURATION: 99 %

## 2025-04-08 DIAGNOSIS — J02.9 SORE THROAT: ICD-10-CM

## 2025-04-08 DIAGNOSIS — Z83.3 FAMILY HISTORY OF DIABETES MELLITUS: ICD-10-CM

## 2025-04-08 DIAGNOSIS — I10 PRIMARY HYPERTENSION: ICD-10-CM

## 2025-04-08 DIAGNOSIS — D50.9 IRON DEFICIENCY ANEMIA, UNSPECIFIED IRON DEFICIENCY ANEMIA TYPE: Primary | ICD-10-CM

## 2025-04-08 DIAGNOSIS — G43.119 INTRACTABLE MIGRAINE WITH AURA WITHOUT STATUS MIGRAINOSUS: ICD-10-CM

## 2025-04-08 DIAGNOSIS — G57.10 MERALGIA PARESTHETICA, UNSPECIFIED LATERALITY: Primary | ICD-10-CM

## 2025-04-08 DIAGNOSIS — R20.0 NUMBNESS AND TINGLING OF BOTH LEGS BELOW KNEES: ICD-10-CM

## 2025-04-08 DIAGNOSIS — R20.2 NUMBNESS AND TINGLING OF BOTH LEGS BELOW KNEES: ICD-10-CM

## 2025-04-08 LAB
ALBUMIN SERPL-MCNC: 4.7 G/DL (ref 3.5–5.2)
ALBUMIN/GLOB SERPL: 1.4 G/DL
ALP SERPL-CCNC: 114 U/L (ref 39–117)
ALT SERPL W P-5'-P-CCNC: 21 U/L (ref 1–33)
ANION GAP SERPL CALCULATED.3IONS-SCNC: 9.8 MMOL/L (ref 5–15)
AST SERPL-CCNC: 22 U/L (ref 1–32)
B-HCG UR QL: NEGATIVE
BASOPHILS # BLD AUTO: 0.02 10*3/MM3 (ref 0–0.2)
BASOPHILS NFR BLD AUTO: 0.2 % (ref 0–1.5)
BILIRUB SERPL-MCNC: 0.2 MG/DL (ref 0–1.2)
BILIRUB UR QL STRIP: NEGATIVE
BUN SERPL-MCNC: 9 MG/DL (ref 6–20)
BUN/CREAT SERPL: 12.2 (ref 7–25)
CALCIUM SPEC-SCNC: 9.9 MG/DL (ref 8.6–10.5)
CHLORIDE SERPL-SCNC: 104 MMOL/L (ref 98–107)
CLARITY UR: CLEAR
CO2 SERPL-SCNC: 23.2 MMOL/L (ref 22–29)
COLOR UR: YELLOW
CREAT SERPL-MCNC: 0.74 MG/DL (ref 0.57–1)
D DIMER PPP FEU-MCNC: 0.3 MCGFEU/ML (ref 0–0.5)
DEPRECATED RDW RBC AUTO: 44.2 FL (ref 37–54)
EGFRCR SERPLBLD CKD-EPI 2021: 113.9 ML/MIN/1.73
EOSINOPHIL # BLD AUTO: 0.3 10*3/MM3 (ref 0–0.4)
EOSINOPHIL NFR BLD AUTO: 3.7 % (ref 0.3–6.2)
ERYTHROCYTE [DISTWIDTH] IN BLOOD BY AUTOMATED COUNT: 13.7 % (ref 12.3–15.4)
GLOBULIN UR ELPH-MCNC: 3.4 GM/DL
GLUCOSE SERPL-MCNC: 102 MG/DL (ref 65–99)
GLUCOSE UR STRIP-MCNC: NEGATIVE MG/DL
HCT VFR BLD AUTO: 39.9 % (ref 34–46.6)
HGB BLD-MCNC: 12.6 G/DL (ref 12–15.9)
HGB UR QL STRIP.AUTO: ABNORMAL
IMM GRANULOCYTES # BLD AUTO: 0.02 10*3/MM3 (ref 0–0.05)
IMM GRANULOCYTES NFR BLD AUTO: 0.2 % (ref 0–0.5)
KETONES UR QL STRIP: NEGATIVE
LEUKOCYTE ESTERASE UR QL STRIP.AUTO: ABNORMAL
LYMPHOCYTES # BLD AUTO: 1.91 10*3/MM3 (ref 0.7–3.1)
LYMPHOCYTES NFR BLD AUTO: 23.5 % (ref 19.6–45.3)
MAGNESIUM SERPL-MCNC: 2 MG/DL (ref 1.6–2.6)
MCH RBC QN AUTO: 27 PG (ref 26.6–33)
MCHC RBC AUTO-ENTMCNC: 31.6 G/DL (ref 31.5–35.7)
MCV RBC AUTO: 85.4 FL (ref 79–97)
MONOCYTES # BLD AUTO: 0.56 10*3/MM3 (ref 0.1–0.9)
MONOCYTES NFR BLD AUTO: 6.9 % (ref 5–12)
NEUTROPHILS NFR BLD AUTO: 5.33 10*3/MM3 (ref 1.7–7)
NEUTROPHILS NFR BLD AUTO: 65.5 % (ref 42.7–76)
NITRITE UR QL STRIP: NEGATIVE
PH UR STRIP.AUTO: 6 [PH] (ref 5–8)
PLATELET # BLD AUTO: 294 10*3/MM3 (ref 140–450)
PMV BLD AUTO: 9.9 FL (ref 6–12)
POTASSIUM SERPL-SCNC: 3.7 MMOL/L (ref 3.5–5.2)
PROT SERPL-MCNC: 8.1 G/DL (ref 6–8.5)
PROT UR QL STRIP: NEGATIVE
RBC # BLD AUTO: 4.67 10*6/MM3 (ref 3.77–5.28)
SODIUM SERPL-SCNC: 137 MMOL/L (ref 136–145)
SP GR UR STRIP: 1.01 (ref 1–1.03)
UROBILINOGEN UR QL STRIP: ABNORMAL
WBC NRBC COR # BLD AUTO: 8.14 10*3/MM3 (ref 3.4–10.8)

## 2025-04-08 PROCEDURE — 99284 EMERGENCY DEPT VISIT MOD MDM: CPT | Performed by: STUDENT IN AN ORGANIZED HEALTH CARE EDUCATION/TRAINING PROGRAM

## 2025-04-08 PROCEDURE — 83735 ASSAY OF MAGNESIUM: CPT | Performed by: STUDENT IN AN ORGANIZED HEALTH CARE EDUCATION/TRAINING PROGRAM

## 2025-04-08 PROCEDURE — 81025 URINE PREGNANCY TEST: CPT | Performed by: STUDENT IN AN ORGANIZED HEALTH CARE EDUCATION/TRAINING PROGRAM

## 2025-04-08 PROCEDURE — 81003 URINALYSIS AUTO W/O SCOPE: CPT | Performed by: STUDENT IN AN ORGANIZED HEALTH CARE EDUCATION/TRAINING PROGRAM

## 2025-04-08 PROCEDURE — 85379 FIBRIN DEGRADATION QUANT: CPT | Performed by: STUDENT IN AN ORGANIZED HEALTH CARE EDUCATION/TRAINING PROGRAM

## 2025-04-08 PROCEDURE — 80050 GENERAL HEALTH PANEL: CPT | Performed by: STUDENT IN AN ORGANIZED HEALTH CARE EDUCATION/TRAINING PROGRAM

## 2025-04-08 PROCEDURE — 99214 OFFICE O/P EST MOD 30 MIN: CPT | Performed by: NURSE PRACTITIONER

## 2025-04-08 PROCEDURE — 36415 COLL VENOUS BLD VENIPUNCTURE: CPT

## 2025-04-08 PROCEDURE — 99283 EMERGENCY DEPT VISIT LOW MDM: CPT

## 2025-04-08 RX ORDER — ACETAMINOPHEN 500 MG
500 TABLET ORAL EVERY 6 HOURS PRN
Qty: 30 TABLET | Refills: 0 | Status: SHIPPED | OUTPATIENT
Start: 2025-04-08

## 2025-04-08 RX ORDER — ACETAMINOPHEN 500 MG
1000 TABLET ORAL ONCE
Status: DISCONTINUED | OUTPATIENT
Start: 2025-04-08 | End: 2025-04-08 | Stop reason: HOSPADM

## 2025-04-08 RX ORDER — HYDROXYZINE HYDROCHLORIDE 10 MG/1
10 TABLET, FILM COATED ORAL 3 TIMES DAILY PRN
Qty: 25 TABLET | Refills: 0 | Status: SHIPPED | OUTPATIENT
Start: 2025-04-08

## 2025-04-08 RX ORDER — HYDROXYZINE HYDROCHLORIDE 25 MG/1
25 TABLET, FILM COATED ORAL ONCE
Status: COMPLETED | OUTPATIENT
Start: 2025-04-08 | End: 2025-04-08

## 2025-04-08 RX ORDER — BENZONATATE 100 MG/1
100 CAPSULE ORAL 3 TIMES DAILY PRN
Qty: 30 CAPSULE | Refills: 1 | Status: SHIPPED | OUTPATIENT
Start: 2025-04-08

## 2025-04-08 RX ADMIN — HYDROXYZINE HYDROCHLORIDE 25 MG: 25 TABLET, FILM COATED ORAL at 19:54

## 2025-04-08 NOTE — FSED PROVIDER NOTE
Subjective   History of Present Illness  History obtained via patient    Outside records reviewed include Family medicine documentation from earlier today, CHARITY Cain.  Patient was evaluated for her follow-up appointment and she was complaining of intermittent numbness and tingling in her lower extremities.    MDM/HPI:  29-year-old female history of migraines with aura, preeclampsia, presents emergency department for evaluation of tingling.  She went to her PCP for the same issue.  She was told to go to the emergency department if her symptoms worsen.  She states that over the past couple days, she started to notice some paresthesias like her legs feel asleep.  However, it is confined to the lateral aspects of her bilateral lower extremities.  Starts just below the knee and then is present up in the lateral portion of the thigh.  It is significantly worse while sitting.  No recent viral illnesses or diarrheal illnesses.  No rashes.  Has not experienced before.  No headaches.  No weakness.  No changes in urinary or bowel habits.    Gen: NAD  CV: RRR, S1S2, No murmur, No edema  Pulm: CTAB, Unlabored respirations  Abd: Soft, NT/ND  MSK: No deformities  Neuro: A+Ox3, GCS 15, Moving all extremities spontaneously    On arrival to the emergency department, vital signs are afebrile and hemodynamically stable.  On physical exam, patient is in no acute distress, has no focal neuro deficits.  Differential diagnosis includes lat fem cutaneous nerve of thigh, electrolyte, neuropathy, glucose, thyroid.             Review of Systems    Past Medical History:   Diagnosis Date    Chlamydia 2016    COVID-19 2023    2022 10/2021-Vaxxed and Boosted. Patient reports havinf Covid x5, most recent 2024    History of  delivery 2024    @34 weeks    Migraine 2023    Diagnosed 3/2024    Preeclampsia 2023    Urinary tract infection 2022       No Known Allergies    Past Surgical History:   Procedure  Laterality Date    CYSTECTOMY  2015    Lower Back    FINGER SURGERY Left     Pinky    WISDOM TOOTH EXTRACTION  2015       Family History   Problem Relation Age of Onset    Hyperlipidemia Father     Hypertension Father     Pulmonary embolism Father     Heart attack Father     No Known Problems Mother     No Known Problems Brother     No Known Problems Brother     Asthma Sister     Diabetes Sister         Type II    No Known Problems Sister     Seizures Sister     No Known Problems Sister     Diabetes Paternal Grandfather     Thyroid cancer Paternal Grandmother     Cancer Paternal Grandmother     Diabetes Maternal Grandmother     Hyperlipidemia Maternal Grandmother     Hypertension Maternal Grandmother     Diabetes Maternal Grandfather     Breast cancer Neg Hx     Ovarian cancer Neg Hx     Uterine cancer Neg Hx     Colon cancer Neg Hx        Social History     Socioeconomic History    Marital status: Single   Tobacco Use    Smoking status: Never     Passive exposure: Never    Smokeless tobacco: Never   Vaping Use    Vaping status: Never Used   Substance and Sexual Activity    Alcohol use: Yes     Alcohol/week: 1.0 standard drink of alcohol     Types: 1 Drinks containing 0.5 oz of alcohol per week    Drug use: Never    Sexual activity: Yes     Partners: Male     Birth control/protection: Birth control pill           Objective   Physical Exam    Procedures           ED Course                                           Medical Decision Making  Labs viewed and interpreted contemporaneously by me and are unremarkable for emergent abnormalities including crit electrolyte abnormalities    Administered acetaminophen and hydroxyzine. 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 Possibly anxiety. Patient can follow up further with PCP. No focal deficits on exam      Patient stable on reassessment.  Vital  signs afebrile and hemodynamically stable.  Pain is well controlled. Patient is able to ambulate and tolerate PO intake without difficulty.  Low concern for any additional acute or life-threatening conditions requiring further work-up or emergent intervention at this time.  Laboratory work-up and imaging findings including normal labs discussed with the patient who is able to articulate and affirmed their understanding, and feels comfortable with discharge at this time. Plan on prescribing patient with the above medications, and discussed the risks and benefits of these prescriptions and the schedule for their proper administration. Post discharge follow-up appointments including with pcp also discussed with patient, who indicated that they would schedule and attend these follow-up appointments.  Strict return precautions discussed prior to discharge including the importance of returning to the ED with any new or worsening symptoms. Patient agreeable to plan as stated above without any further questions or concerns.      Problems Addressed:  Meralgia paresthetica, unspecified laterality: complicated acute illness or injury    Amount and/or Complexity of Data Reviewed  Labs: ordered.    Risk  OTC drugs.  Prescription drug management.        Final diagnoses:   Meralgia paresthetica, unspecified laterality       ED Disposition  ED Disposition       ED Disposition   Discharge    Condition   Good    Comment   --               Tiara Cain, APRN  [address] [address]  [phone]    Schedule an appointment as soon as possible for a visit   As needed, If symptoms worsen         Medication List        New Prescriptions      acetaminophen 500 MG tablet  Commonly known as: TYLENOL  Take 1 tablet by mouth Every 6 (Six) Hours As Needed for Mild Pain.     hydrOXYzine 10 MG tablet  Commonly known as: ATARAX  Take 1 tablet by mouth 3 (Three) Times a Day As Needed (paresthesias).                Where to Get Your Medications        These medications were sent to [facility]/pharmacy #[id number] - [place], [place] - [address] AT [facility] - [phone]  - [phone]   [address] [address]      Phone: [phone]   acetaminophen 500 MG tablet  hydrOXYzine 10 MG tablet

## 2025-04-08 NOTE — ASSESSMENT & PLAN NOTE
Headaches are stable.    Plan:  Continue same medication/s without change.     Discussed medication dosage, use, side effects, and goals of treatment in detail.    Discussed monitoring symptoms and use of quick-relief medications and maintenance medication.    General Treatment Goals:   symptom prevention  minimize work absence  minimizing limitation in activity  prevention of exacerbations  decrease use of ER/inpatient care  minimization of adverse effects of treatment    Followup in 6 months  Keep follow-up with neurologist  Recently adjusted medication  She did reach out to the office about numbness and tingling in her legs              Orders:    Comprehensive metabolic panel    Lipid panel    CBC and Differential    TSH    Vitamin D,25-Hydroxy    Vitamin B12    Folate    Hemoglobin A1c

## 2025-04-08 NOTE — ASSESSMENT & PLAN NOTE
Hypertension is stable and controlled  Continue current treatment regimen.  Blood pressure will be reassessed in 6 months.    Orders:    Comprehensive metabolic panel    Lipid panel    CBC and Differential    TSH    Vitamin D,25-Hydroxy    Vitamin B12    Folate    Hemoglobin A1c

## 2025-04-08 NOTE — PROGRESS NOTES
Chief Complaint  Numbness, Tingling, and Sore Throat    Subjective        Sore Throat   Pertinent negatives include no coughing, headaches or shortness of breathNandini Phan presents [facility] as a 29 year old female C/o intermittent numbness and tingling in her lower extremities bilaterally.  States that occasionally she has some swelling her her legs bilaterally that resolves with rest and elevated      No other neurological changes  No acute headaches blurred vision, dizziness in office today  Migraine headaches-has seen a neurologist and she does have follow-up appointment scheduled with that specialty.  She was taking Nurtec.  She is still getting migraines several times per month.  She does take her medication as directed with no side effects  She does get some sensitivity to light and sound and occasional vision changes/seeing stars  No increase or changes      Hypertension-has been well-controlled on amlodipine  She does have headaches from migraines but no increase or changes, no blurred vision no dizziness no flushing no chest pain or pressure       She was iron deficient anemic during her pregnancy.  She is no longer taking any iron supplements.  She does feel slightly fatigued    She has had a mild sore throat and some postnasal drainage.  Does have seasonal allergies    She has no other acute complaints today    The following portions of the patient's history were reviewed and updated as appropriate: allergies, current medications, past family history, past medical history, past social history, past surgical history, and problem list       Review of Systems   Constitutional:  Negative for chills, fatigue and fever.   HENT:  Positive for postnasal drip and sore throat.    Eyes:  Negative for visual disturbance.   Respiratory:  Negative for cough, shortness of breath and wheezing.    Cardiovascular:  Negative for chest pain, palpitations and leg swelling.  "  Neurological:  Positive for numbness. Negative for dizziness.   Psychiatric/Behavioral:  Negative for self-injury, sleep disturbance and suicidal ideas. The patient is not nervous/anxious.         Objective   Vital Signs:   Vitals:    04/08/25 1621   BP: 118/70   Pulse: 94   Temp: 97.1 °F (36.2 °C)   SpO2: 98%   Weight: 83.9 kg (185 lb)   Height: 170.2 cm (67.01\")   PainSc: 0-No pain            4/8/2025     4:21 PM   PHQ-2/PHQ-9 Depression Screening   Little interest or pleasure in doing things Not at all   Feeling down, depressed, or hopeless Not at all   How difficult have these problems made it for you to do your work, take care of things at home, or get along with other people? Not difficult at all               Physical Exam  Vitals reviewed.   Constitutional:       General: She is not in acute distress.  Eyes:      Conjunctiva/sclera: Conjunctivae normal.   Neck:      Thyroid: No thyromegaly.      Vascular: No carotid bruit.   Cardiovascular:      Rate and Rhythm: Normal rate and regular rhythm.      Heart sounds: Normal heart sounds. No murmur heard.  Pulmonary:      Effort: Pulmonary effort is normal. No respiratory distress.      Breath sounds: Normal breath sounds. No stridor. No wheezing, rhonchi or rales.   Chest:      Chest wall: No tenderness.   Lymphadenopathy:      Cervical: No cervical adenopathy.   Neurological:      Mental Status: She is alert and oriented to person, place, and time.      Motor: No weakness.      Coordination: Romberg sign negative. Rapid alternating movements normal.   Psychiatric:         Attention and Perception: Attention normal.         Mood and Affect: Mood normal.          Result Review :     The following data was reviewed by: CHARITY Mcqueen on 04/08/2025:  Comprehensive Metabolic Panel (09/26/2024 09:55)  CBC & Differential (09/26/2024 09:55)  Iron Profile (09/26/2024 09:55)  Ferritin (09/26/2024 09:55)    Lab results are back     You are not anemic, your iron " looks great     All your labs are normal/no concerns      Assessment and Plan       Iron deficiency anemia, unspecified iron deficiency anemia type  Checking labs today  Orders:    Comprehensive metabolic panel    Lipid panel    CBC and Differential    TSH    Vitamin D,25-Hydroxy    Vitamin B12    Folate    Hemoglobin A1c    Primary hypertension  Hypertension is stable and controlled  Continue current treatment regimen.  Blood pressure will be reassessed in 6 months.    Orders:    Comprehensive metabolic panel    Lipid panel    CBC and Differential    TSH    Vitamin D,25-Hydroxy    Vitamin B12    Folate    Hemoglobin A1c    Intractable migraine with aura without status migrainosus  Headaches are stable.    Plan:  Continue same medication/s without change.     Discussed medication dosage, use, side effects, and goals of treatment in detail.    Discussed monitoring symptoms and use of quick-relief medications and maintenance medication.    General Treatment Goals:   symptom prevention  minimize work absence  minimizing limitation in activity  prevention of exacerbations  decrease use of ER/inpatient care  minimization of adverse effects of treatment    Followup in 6 months  Keep follow-up with neurologist  Recently adjusted medication  She did reach out to the office about numbness and tingling in her legs              Orders:    Comprehensive metabolic panel    Lipid panel    CBC and Differential    TSH    Vitamin D,25-Hydroxy    Vitamin B12    Folate    Hemoglobin A1c    Family history of diabetes mellitus  Check hemoglobin A1c with labs  Orders:    Comprehensive metabolic panel    Lipid panel    CBC and Differential    TSH    Vitamin D,25-Hydroxy    Vitamin B12    Folate    Hemoglobin A1c    Numbness and tingling of both legs below knees  Checking labs  Orders:    Comprehensive metabolic panel    Lipid panel    CBC and Differential    TSH    Vitamin D,25-Hydroxy    Vitamin B12    Folate    Hemoglobin  A1c      Instructions to go to the ER with any increased neurological symptoms reviewed        Follow Up   Return in about 4 weeks (around 5/6/2025), or if symptoms worsen or fail to improve.  Patient was given instructions and counseling regarding her condition or for health maintenance advice. Please see specific information pulled into the AVS if appropriate.

## 2025-04-08 NOTE — TELEPHONE ENCOUNTER
Caller: Sylvester Clarke    Relationship to patient: Self    Best call back number: [phone]    Chief complaint: TINGLING AND NUMBNESS IN LEGS    Patient directed to call 911 or go to their nearest emergency room.     Patient verbalized understanding: [x] Yes  [] No  If no, why?

## 2025-04-08 NOTE — ASSESSMENT & PLAN NOTE
Checking labs today  Orders:    Comprehensive metabolic panel    Lipid panel    CBC and Differential    TSH    Vitamin D,25-Hydroxy    Vitamin B12    Folate    Hemoglobin A1c

## 2025-04-09 ENCOUNTER — SPECIALTY PHARMACY (OUTPATIENT)
Dept: NEUROLOGY | Facility: CLINIC | Age: 30
End: 2025-04-09
Payer: COMMERCIAL

## 2025-04-09 LAB
25(OH)D3+25(OH)D2 SERPL-MCNC: 13.1 NG/ML (ref 30–100)
ALBUMIN SERPL-MCNC: 4.6 G/DL (ref 4–5)
ALP SERPL-CCNC: 115 IU/L (ref 44–121)
ALT SERPL-CCNC: 18 IU/L (ref 0–32)
AST SERPL-CCNC: 22 IU/L (ref 0–40)
BASOPHILS # BLD AUTO: 0 X10E3/UL (ref 0–0.2)
BASOPHILS NFR BLD AUTO: 0 %
BILIRUB SERPL-MCNC: 0.3 MG/DL (ref 0–1.2)
BUN SERPL-MCNC: 9 MG/DL (ref 6–20)
BUN/CREAT SERPL: 12 (ref 9–23)
CALCIUM SERPL-MCNC: 9.7 MG/DL (ref 8.7–10.2)
CHLORIDE SERPL-SCNC: 104 MMOL/L (ref 96–106)
CHOLEST SERPL-MCNC: 170 MG/DL (ref 100–199)
CO2 SERPL-SCNC: 21 MMOL/L (ref 20–29)
CREAT SERPL-MCNC: 0.76 MG/DL (ref 0.57–1)
EGFRCR SERPLBLD CKD-EPI 2021: 110 ML/MIN/1.73
EOSINOPHIL # BLD AUTO: 0.3 X10E3/UL (ref 0–0.4)
EOSINOPHIL NFR BLD AUTO: 3 %
ERYTHROCYTE [DISTWIDTH] IN BLOOD BY AUTOMATED COUNT: 13.2 % (ref 11.7–15.4)
FOLATE SERPL-MCNC: 7.5 NG/ML
GLOBULIN SER CALC-MCNC: 2.8 G/DL (ref 1.5–4.5)
GLUCOSE SERPL-MCNC: 106 MG/DL (ref 70–99)
HBA1C MFR BLD: 5.5 % (ref 4.8–5.6)
HCT VFR BLD AUTO: 40.4 % (ref 34–46.6)
HDLC SERPL-MCNC: 59 MG/DL
HGB BLD-MCNC: 12.7 G/DL (ref 11.1–15.9)
IMM GRANULOCYTES # BLD AUTO: 0.1 X10E3/UL (ref 0–0.1)
IMM GRANULOCYTES NFR BLD AUTO: 1 %
LDLC SERPL CALC-MCNC: 98 MG/DL (ref 0–99)
LYMPHOCYTES # BLD AUTO: 2 X10E3/UL (ref 0.7–3.1)
LYMPHOCYTES NFR BLD AUTO: 23 %
MCH RBC QN AUTO: 27.1 PG (ref 26.6–33)
MCHC RBC AUTO-ENTMCNC: 31.4 G/DL (ref 31.5–35.7)
MCV RBC AUTO: 86 FL (ref 79–97)
MONOCYTES # BLD AUTO: 0.6 X10E3/UL (ref 0.1–0.9)
MONOCYTES NFR BLD AUTO: 7 %
NEUTROPHILS # BLD AUTO: 5.9 X10E3/UL (ref 1.4–7)
NEUTROPHILS NFR BLD AUTO: 66 %
PLATELET # BLD AUTO: 298 X10E3/UL (ref 150–450)
POTASSIUM SERPL-SCNC: 4.1 MMOL/L (ref 3.5–5.2)
PROT SERPL-MCNC: 7.4 G/DL (ref 6–8.5)
RBC # BLD AUTO: 4.68 X10E6/UL (ref 3.77–5.28)
SODIUM SERPL-SCNC: 138 MMOL/L (ref 134–144)
TRIGL SERPL-MCNC: 69 MG/DL (ref 0–149)
TSH SERPL DL<=0.005 MIU/L-ACNC: 1.3 UIU/ML (ref 0.45–4.5)
TSH SERPL DL<=0.05 MIU/L-ACNC: 2.46 UIU/ML (ref 0.27–4.2)
VIT B12 SERPL-MCNC: 459 PG/ML (ref 232–1245)
VLDLC SERPL CALC-MCNC: 13 MG/DL (ref 5–40)
WBC # BLD AUTO: 9 X10E3/UL (ref 3.4–10.8)

## 2025-04-09 NOTE — PROGRESS NOTES
Specialty Pharmacy Patient Management Program  Refill Outreach     Sylvester was contacted today regarding refills of their medication(s).    Refill Questions      Flowsheet Row Most Recent Value   Changes to allergies? No   Changes to medications? Yes  [Hydroxyzine and benzontate- not started these yet.]   New conditions or infections since last clinic visit Yes   If yes, please describe  Pinched nerve, Cough.   Unplanned office visit, urgent care, ED, or hospital admission in the last 4 weeks  Yes  [ED for numbness in leg- dx pinched nerve]   How does patient/caregiver feel medication is working? Very good   Financial problems or insurance changes  No   Since the previous refill, were any specialty medication doses or scheduled injections missed or delayed?  No   Does this patient require a clinical escalation to a pharmacist? Yes            Delivery Questions      Flowsheet Row Most Recent Value   Delivery method UPS   Delivery address verified with patient/caregiver? Yes   Delivery address Home   Other address preferred NA   Number of medications in delivery 2   Medication(s) being filled and delivered Ubrogepant (UBRELVY), Fremanezumab-vfrm (Ajovy)   Doses left of specialty medications 0   Copay verified? Yes   Copay amount $15   Copay form of payment Credit/debit on file   Delivery Date Selection 04/11/25   Signature Required No   Do you consent to receive electronic handouts?  Yes                 Follow-up: 25 day(s)     Bette Springer  4/9/2025  15:14 EDT

## 2025-04-10 ENCOUNTER — RESULTS FOLLOW-UP (OUTPATIENT)
Dept: FAMILY MEDICINE CLINIC | Facility: CLINIC | Age: 30
End: 2025-04-10
Payer: COMMERCIAL

## 2025-04-10 DIAGNOSIS — E55.9 VITAMIN D DEFICIENCY: Primary | ICD-10-CM

## 2025-04-10 RX ORDER — ERGOCALCIFEROL 1.25 MG/1
50000 CAPSULE, LIQUID FILLED ORAL
Qty: 12 CAPSULE | Refills: 2 | Status: SHIPPED | OUTPATIENT
Start: 2025-04-10

## 2025-04-10 NOTE — LETTER
Sylvesetr AMBER Clarke  [address] [address]    April 16, 2025     Dear Ms. Clarke:    Below are the results from your recent visit:    Resulted Orders   Comprehensive metabolic panel   Result Value Ref Range    Glucose 106 (H) 70 - 99 mg/dL    BUN 9 6 - 20 mg/dL    Creatinine 0.76 0.57 - 1.00 mg/dL    EGFR Result 110 >59 mL/min/1.73    BUN/Creatinine Ratio 12 9 - 23    Sodium 138 134 - 144 mmol/L    Potassium 4.1 3.5 - 5.2 mmol/L    Chloride 104 96 - 106 mmol/L    Total CO2 21 20 - 29 mmol/L    Calcium 9.7 8.7 - 10.2 mg/dL    Total Protein 7.4 6.0 - 8.5 g/dL    Albumin 4.6 4.0 - 5.0 g/dL    Globulin 2.8 1.5 - 4.5 g/dL    Total Bilirubin 0.3 0.0 - 1.2 mg/dL    Alkaline Phosphatase 115 44 - 121 IU/L    AST (SGOT) 22 0 - 40 IU/L    ALT (SGPT) 18 0 - 32 IU/L   Lipid panel   Result Value Ref Range    Total Cholesterol 170 100 - 199 mg/dL    Triglycerides 69 0 - 149 mg/dL    HDL Cholesterol 59 >39 mg/dL    VLDL Cholesterol Hung 13 5 - 40 mg/dL    LDL Chol Calc (NIH) 98 0 - 99 mg/dL   CBC and Differential   Result Value Ref Range    WBC 9.0 3.4 - 10.8 x10E3/uL    RBC 4.68 3.77 - 5.28 x10E6/uL    Hemoglobin 12.7 11.1 - 15.9 g/dL    Hematocrit 40.4 34.0 - 46.6 %    MCV 86 79 - 97 fL    MCH 27.1 26.6 - 33.0 pg    MCHC 31.4 (L) 31.5 - 35.7 g/dL    RDW 13.2 11.7 - 15.4 %    Platelets 298 150 - 450 x10E3/uL    Neutrophil Rel % 66 Not Estab. %    Lymphocyte Rel % 23 Not Estab. %    Monocyte Rel % 7 Not Estab. %    Eosinophil Rel % 3 Not Estab. %    Basophil Rel % 0 Not Estab. %    Neutrophils Absolute 5.9 1.4 - 7.0 x10E3/uL    Lymphocytes Absolute 2.0 0.7 - 3.1 x10E3/uL    Monocytes Absolute 0.6 0.1 - 0.9 x10E3/uL    Eosinophils Absolute 0.3 0.0 - 0.4 x10E3/uL    Basophils Absolute 0.0 0.0 - 0.2 x10E3/uL    Immature Granulocyte Rel % 1 Not Estab. %    Immature Grans Absolute 0.1 0.0 - 0.1 x10E3/uL   TSH   Result Value Ref Range    TSH 1.300 0.450 - 4.500 uIU/mL   Vitamin D,25-Hydroxy   Result Value Ref Range     25 Hydroxy, Vitamin D 13.1 (L) 30.0 - 100.0 ng/mL      Comment:      Vitamin D deficiency has been defined by the [place] of  Medicine and an Endocrine Society practice guideline as a  level of serum 25-OH vitamin D less than 20 ng/mL (1,2).  The Endocrine Society went on to further define vitamin D  insufficiency as a level between 21 and 29 ng/mL (2).  1. IOM ([place] of [employer]). 2010. Dietary reference     intakes for calcium and D. [place]: The     National Academies Press.  2. Andrea MF, Diane HIGHTOWER, Sarbjit WINSTON, et al.     Evaluation, treatment, and prevention of vitamin D     deficiency: an Endocrine Society clinical practice     guideline. JCEM. 2011 Jul; 96(7):1911-30.     Vitamin B12   Result Value Ref Range    Vitamin B-12 459 232 - 1,245 pg/mL   Folate   Result Value Ref Range    Folate 7.5 >3.0 ng/mL      Comment:      A serum folate concentration of less than 3.1 ng/mL is  considered to represent clinical deficiency.     Hemoglobin A1c   Result Value Ref Range    Hemoglobin A1C 5.5 4.8 - 5.6 %      Comment:               Prediabetes: 5.7 - 6.4           Diabetes: >6.4           Glycemic control for adults with diabetes: <7.0         Labs all look normal or stable except vitamin D is low  will send in a weekly supplement to take to help with fatigue          If you have any questions or concerns, please don't hesitate to call.         Sincerely,        CHARITY Mcqueen

## 2025-04-10 NOTE — PROGRESS NOTES
Labs all look normal or stable except vitamin D is low  will send in a weekly supplement to take to help with fatigue

## 2025-05-19 DIAGNOSIS — J30.2 SEASONAL ALLERGIC RHINITIS, UNSPECIFIED TRIGGER: Primary | ICD-10-CM

## 2025-05-19 RX ORDER — MONTELUKAST SODIUM 10 MG/1
10 TABLET ORAL NIGHTLY
Qty: 90 TABLET | Refills: 1 | Status: SHIPPED | OUTPATIENT
Start: 2025-05-19

## 2025-06-09 ENCOUNTER — SPECIALTY PHARMACY (OUTPATIENT)
Dept: NEUROLOGY | Facility: CLINIC | Age: 30
End: 2025-06-09
Payer: COMMERCIAL

## 2025-06-09 ENCOUNTER — OFFICE VISIT (OUTPATIENT)
Dept: NEUROLOGY | Facility: CLINIC | Age: 30
End: 2025-06-09
Payer: COMMERCIAL

## 2025-06-09 VITALS
WEIGHT: 182 LBS | HEIGHT: 67 IN | SYSTOLIC BLOOD PRESSURE: 126 MMHG | BODY MASS INDEX: 28.56 KG/M2 | DIASTOLIC BLOOD PRESSURE: 84 MMHG | HEART RATE: 96 BPM | OXYGEN SATURATION: 98 %

## 2025-06-09 DIAGNOSIS — G43.119 INTRACTABLE MIGRAINE WITH AURA WITHOUT STATUS MIGRAINOSUS: Primary | ICD-10-CM

## 2025-06-09 PROCEDURE — 99214 OFFICE O/P EST MOD 30 MIN: CPT | Performed by: PSYCHIATRY & NEUROLOGY

## 2025-06-09 RX ORDER — LEVOCETIRIZINE DIHYDROCHLORIDE 5 MG/1
5 TABLET, FILM COATED ORAL EVERY EVENING
COMMUNITY

## 2025-06-09 NOTE — PROGRESS NOTES
Specialty Pharmacy Patient Management Program  Refill Outreach     [facility] mariusz Springer  6/9/2025  13:13 EDT

## 2025-06-09 NOTE — PROGRESS NOTES
Chief Complaint  Migraine (Missed her Ajovy injection, has had about 10 a month )    Subjective          Sylvester Clarke presents to [facility] NEUROLOGY for   HISTORY OF PRESENT ILLNESS:    Sylvester Clarke is a 29 year old left handed woman who returns to neurology clinic for follow up evaluation and treatment of migraines which has responded well to Nurtec ODT every other day but her insurance had denied this medicine and opted to try her on a different medication and most recently tried Ajovy which caused side effect of causing a large bump on her leg and she has needle phobia.  She reports a history of headaches starting 10+ years ago.  Her headaches start on the right side and moves to involve her entire head which she describes as a dull pain which she rates as 8-9/10 on pain scale 1-10 when most severe with associated light and sound sensitivity along with nausea without vomiting.  The headaches can last up to 3-4 days in duration.  She was getting 2-3 headache days per week and 3-4 migraine days per week for which Nurtec ODT had been helpful and is currently getting 1-2 (if she remembers to take the medicine as prescribed) and if not up to 2-3 migraines per month.  She delivered her healthy baby.  She did have preeclampsia with the pregnancy and the headache at that time may have been related to the preeclampsia.  Of note she had brain MRI/MRV scan in 2024 which did not demonstrate any acute intracranial abnormalities.  She does think her mother gets migraines.   She was on labetalol which has been switched to amlodipine more recently.  She was previously on IUD which was changed to NuvaRing and more recently to norethindrone OCP.  I informed her that women of childbearing age with migraines with auras are at a potential increased risk for stroke and this risk is increased with estrogen containing birth control.  She has tried ibuprofen, Tylenol and magnesium oxide which is all she has  tried.  She has not really noticed any triggers for her headaches.  She does not smoke.  She does have HTN so I do not recommend triptans.  I tried her more recently on Ajovy which caused side effects of large swelling on her leg and also she tells me she has needle phobia and has a hard time administering the injections.  She would like further assistance with her migraines today.  She has had some dizziness in association with sinus issues for which she is trying newer allergy medications.  Ubrelvy did not help as much as Nurtec ODT for acute treatment.  She is going to her eye doctor to get her eyes checked.      Past Medical History:   Diagnosis Date    Allergic     Seasonal    Chlamydia 2016    COVID-19 2023    2022 10/2021-Vaxxed and Boosted. Patient reports havinf Covid x5, most recent 2024    Gestational hypertension 2023    History of  delivery 2024    @34 weeks    Migraine 2023    Diagnosed 3/2024    Preeclampsia 2023    Urinary tract infection 2022        Family History   Problem Relation Age of Onset    Hyperlipidemia Father     Hypertension Father     Pulmonary embolism Father     Heart attack Father     No Known Problems Mother     No Known Problems Brother     No Known Problems Brother     Asthma Sister     Diabetes Sister         Type II    No Known Problems Sister     Seizures Sister     No Known Problems Sister     Diabetes Paternal Grandfather     Thyroid cancer Paternal Grandmother     Cancer Paternal Grandmother     Diabetes Maternal Grandmother     Hyperlipidemia Maternal Grandmother     Hypertension Maternal Grandmother     Diabetes Maternal Grandfather     Diabetes Sister     Breast cancer Neg Hx     Ovarian cancer Neg Hx     Uterine cancer Neg Hx     Colon cancer Neg Hx         Social History     Socioeconomic History    Marital status: Single   Tobacco Use    Smoking status: Never     Passive exposure: Never    Smokeless tobacco: Never   Vaping Use  "   Vaping status: Never Used   Substance and Sexual Activity    Alcohol use: Yes     Alcohol/week: 1.0 standard drink of alcohol     Types: 1 Drinks containing 0.5 oz of alcohol per week    Drug use: Never    Sexual activity: Yes     Partners: Male     Birth control/protection: Birth control pill     Comment: Gabby        I have reviewed and confirmed the accuracy of the ROS as documented by the MA/LPN/RN Wilfrido Gomez MD   Review of Systems   Neurological:  Positive for dizziness and headache. Negative for tremors, seizures, syncope, facial asymmetry, speech difficulty, weakness, light-headedness, numbness, memory problem and confusion.   Psychiatric/Behavioral:  Negative for agitation, behavioral problems, decreased concentration, dysphoric mood, hallucinations, self-injury, sleep disturbance, suicidal ideas, negative for hyperactivity, depressed mood and stress. The patient is not nervous/anxious.         Objective   Vital Signs:   /84   Pulse 96   Ht 170.2 cm (67.01\")   Wt 82.6 kg (182 lb)   SpO2 98%   BMI 28.50 kg/m²       PHYSICAL EXAM:    General   Mental Status - Alert. General Appearance - Well developed, Well groomed, Oriented and Cooperative. Orientation - Oriented X3.       Head and Neck  Head - normocephalic, atraumatic with no lesions or palpable masses.  Neck    Global Assessment - supple.       Eye   Sclera/Conjunctiva - Bilateral - Normal.    ENMT  Mouth and Throat   Oral Cavity/Oropharynx: Oropharynx - the soft palate,uvula and tongue are normal in appearance.    Chest and Lung Exam   Chest - lung clear to auscultation bilaterally.    Cardiovascular   Cardiovascular examination reveals  - normal heart sounds, regular rate and rhythm.    Neurologic   Mental Status: Speech - Normal. Cognitive function - appropriate fund of knowledge. No impairment of attention, Impairment of concentration, impairment of long term memory or impairment of short term memory.  Cranial Nerves:   II Optic: " Visual acuity - Left - Normal. Right - Normal. Visual fields - Normal (to confrontation).  III Oculomotor: Pupillary constriction - Left - Normal. Right - Normal.  VII Facial: - Normal Bilaterally.   IX Glossopharyngeal / X Vagus - Normal.  XI Accessory: Trapezius - Bilateral - Normal. Sternocleidomastoid - Bilateral - Normal.  XII Hypoglossal - Bilateral - Normal.  Eye Movements: - Normal Bilaterally.  Sensory:   Light Touch: Intact - Globally.  Motor:   Bulk and Contour: - Normal.  Tone: - Normal.  Tremor: Not present.  Strength: 5/5 normal muscle strength - All Muscles.   General Assessment of Reflexes: - deep tendon reflexes are normal. Coordination - No Impairment of finger-to-nose or Impairment of rapid alternating movements. Gait - Normal.       Result Review :                 Assessment and Plan    Problem List Items Addressed This Visit       Intractable migraine with aura without status migrainosus - Primary    Current Assessment & Plan   29 year old left handed woman who returns to neurology clinic for follow up evaluation and treatment of migraines which has responded well to Nurtec ODT every other day but her insurance had denied this medicine and opted to try her on a different medication and most recently tried Ajovy which caused side effect of causing a large bump on her leg and she has needle phobia.  She reports a history of headaches starting 10+ years ago.  Her headaches start on the right side and moves to involve her entire head which she describes as a dull pain which she rates as 8-9/10 on pain scale 1-10 when most severe with associated light and sound sensitivity along with nausea without vomiting.  The headaches can last up to 3-4 days in duration.  She was getting 2-3 headache days per week and 3-4 migraine days per week for which Nurtec ODT had been helpful and is currently getting 1-2 (if she remembers to take the medicine as prescribed) and if not up to 2-3 migraines per month and up  to 10 headache days per month currently.  She delivered her healthy baby.  She did have preeclampsia with the pregnancy and the headache at that time may have been related to the preeclampsia.  Of note she had brain MRI/MRV scan in 2024 which did not demonstrate any acute intracranial abnormalities.  She was on labetalol which has been switched to amlodipine more recently.  She was previously on IUD which was changed to NuvaRing and more recently to norethindrone OCP.  I informed her that women of childbearing age with migraines with auras are at a potential increased risk for stroke and this risk is increased with estrogen containing birth control.  She has tried ibuprofen, Tylenol and magnesium oxide which is all she has tried.  She has not really noticed any triggers for her headaches.  She does not smoke.  She does have HTN so I do not recommend triptans.  I tried her more recently on Ajovy which caused side effects of large swelling on her leg and also she tells me she has needle phobia and has a hard time administering the injections.  She would like further assistance with her migraines today.  She has had some dizziness in association with sinus issues for which she is trying newer allergy medications.  I spoke with her about discontinuing the Ajovy which has caused side effects and she continues to get 10 headache days per month currently and instead transitioning back to Nurtec ODT which was helping significantly.  Ubrelvy did not help as much as Nurtec ODT for acute treatment.  She is going to her eye doctor to get her eyes checked.  I advised her not to get pregnant on this medication.  Discussed migraine triggers and lifestyle modifications and provided patient education information.           Relevant Medications    rimegepant sulfate ODT (Nurtec) 75 MG disintegrating tablet     32 minutes of time was spent with patient in evaluation and treatment, discussing and answering questions.      Follow Up    Return in about 3 months (around 9/9/2025).  Patient was given instructions and counseling regarding her condition or for health maintenance advice. Please see specific information pulled into the AVS if appropriate.

## 2025-06-09 NOTE — ASSESSMENT & PLAN NOTE
29 year old left handed woman who returns to neurology clinic for follow up evaluation and treatment of migraines which has responded well to Nurtec ODT every other day but her insurance had denied this medicine and opted to try her on a different medication and most recently tried Ajovy which caused side effect of causing a large bump on her leg and she has needle phobia.  She reports a history of headaches starting 10+ years ago.  Her headaches start on the right side and moves to involve her entire head which she describes as a dull pain which she rates as 8-9/10 on pain scale 1-10 when most severe with associated light and sound sensitivity along with nausea without vomiting.  The headaches can last up to 3-4 days in duration.  She was getting 2-3 headache days per week and 3-4 migraine days per week for which Nurtec ODT had been helpful and is currently getting 1-2 (if she remembers to take the medicine as prescribed) and if not up to 2-3 migraines per month and up to 10 headache days per month currently.  She delivered her healthy baby.  She did have preeclampsia with the pregnancy and the headache at that time may have been related to the preeclampsia.  Of note she had brain MRI/MRV scan in 2024 which did not demonstrate any acute intracranial abnormalities.  She was on labetalol which has been switched to amlodipine more recently.  She was previously on IUD which was changed to NuvaRing and more recently to norethindrone OCP.  I informed her that women of childbearing age with migraines with auras are at a potential increased risk for stroke and this risk is increased with estrogen containing birth control.  She has tried ibuprofen, Tylenol and magnesium oxide which is all she has tried.  She has not really noticed any triggers for her headaches.  She does not smoke.  She does have HTN so I do not recommend triptans.  I tried her more recently on Ajovy which caused side effects of large swelling on her leg  and also she tells me she has needle phobia and has a hard time administering the injections.  She would like further assistance with her migraines today.  She has had some dizziness in association with sinus issues for which she is trying newer allergy medications.  I spoke with her about discontinuing the Ajovy which has caused side effects and she continues to get 10 headache days per month currently and instead transitioning back to Nurtec ODT which was helping significantly.  Ubrelvy did not help as much as Nurtec ODT for acute treatment.  She is going to her eye doctor to get her eyes checked.  I advised her not to get pregnant on this medication.  Discussed migraine triggers and lifestyle modifications and provided patient education information.

## 2025-06-09 NOTE — PROGRESS NOTES
Specialty Pharmacy Patient Management Program  Refill Outreach     [facility] QOD approved until 6/9/2026      Bette Springer  6/9/2025  14:11 EDT

## 2025-06-10 ENCOUNTER — SPECIALTY PHARMACY (OUTPATIENT)
Dept: NEUROLOGY | Facility: CLINIC | Age: 30
End: 2025-06-10
Payer: COMMERCIAL

## 2025-06-10 NOTE — PROGRESS NOTES
Specialty Pharmacy Patient Management Program  Neurology Initial Assessment     Sylvester Clarke is a 29 y.o. female with chronic migraine seen by a Neurology provider and enrolled in the Neurology Patient Management program offered by [facility].  An initial outreach was conducted, including assessment of therapy appropriateness and specialty medication education for rimegepant (Nurtec). The patient was introduced to services offered by [facility], including: regular assessments, refill coordination, curbside pick-up or mail order delivery options, prior authorization maintenance, and financial assistance programs as applicable. The patient was also provided with contact information for the pharmacy team.     Insurance Coverage & Financial Support  Rx [employer] [facility] and copay card.    Relevant Past Medical History and Comorbidities  Relevant medical history and concomitant health conditions were discussed with the patient. The patient's chart has been reviewed for relevant past medical history and comorbid health conditions and updated as necessary.   Past Medical History:   Diagnosis Date    Allergic     Seasonal    Chlamydia 2016    COVID-19 2023    2022 10/2021-Vaxxed and Boosted. Patient reports havinf Covid x5, most recent 2024    Gestational hypertension 2023    History of  delivery 2024    @34 weeks    Migraine 2023    Diagnosed 3/2024    Preeclampsia 2023    Urinary tract infection 2022     Social History     Socioeconomic History    Marital status: Single   Tobacco Use    Smoking status: Never     Passive exposure: Never    Smokeless tobacco: Never   Vaping Use    Vaping status: Never Used   Substance and Sexual Activity    Alcohol use: Yes     Alcohol/week: 1.0 standard drink of alcohol     Types: 1 Drinks containing 0.5 oz of alcohol per week    Drug use: Never    Sexual activity: Yes     Partners: Male      Birth control/protection: Birth control pill     Comment: Jencycla     Problem list reviewed by Vlad Hernandez RPH on 6/10/2025 at 10:45 AM      Allergies  Known allergies and reactions were discussed with the patient. The patient's chart has been reviewed for  allergy information and updated as necessary.   No Known Allergies  Allergies reviewed by Vlad Hernandez RPH on 6/10/2025 at 10:45 AM      Relevant Laboratory Values  Common labs          9/26/2024    09:55 4/8/2025    16:39 4/8/2025    19:50   Common Labs   Glucose 90  106  102    BUN 10  9  9    Creatinine 0.87  0.76  0.74    Sodium 138  138  137    Potassium 4.8  4.1  3.7    Chloride 104  104  104    Calcium 9.9  9.7  9.9    Albumin 4.6  4.6  4.7    Total Bilirubin 0.5  0.3  0.2    Alkaline Phosphatase 113  115  114    AST (SGOT) 17  22  22    ALT (SGPT) 15  18  21    WBC 5.7  9.0  8.14    Hemoglobin 12.7  12.7  12.6    Hematocrit 40.1  40.4  39.9    Platelets 264  298  294    Total Cholesterol  170     Triglycerides  69     HDL Cholesterol  59     LDL Cholesterol   98     Hemoglobin A1C  5.5         Lab Assessment  The above labs have been reviewed. No dose adjustments are needed for the specialty medication(s) based on the labs.       Current Medication List  This medication list has been reviewed with the patient and evaluated for any interactions or necessary modifications/recommendations, and updated to include all prescription medications, OTC medications, and supplements the patient is currently taking.  This list reflects what is contained in the patient's profile, which has also been marked as reviewed to communicate to other providers it is the most up to date version of the patient's current medication therapy.     Current Outpatient Medications:     amLODIPine (NORVASC) 5 MG tablet, Take 1 tablet by mouth Daily., Disp: 90 tablet, Rfl: 2    levocetirizine (XYZAL) 5 MG tablet, Take 1 tablet by mouth Every Evening., Disp: , Rfl:      montelukast (Singulair) 10 MG tablet, Take 1 tablet by mouth Every Night., Disp: 90 tablet, Rfl: 1    norethindrone (Jencycla) 0.35 MG tablet, Take 1 tablet by mouth Daily., Disp: 84 tablet, Rfl: 3    rimegepant sulfate ODT (Nurtec) 75 MG disintegrating tablet, Place 1 tablet under the tongue Every Other Day., Disp: 16 tablet, Rfl: 2    Medicines reviewed by Vlad Hernandez [facility] on 6/10/2025 at 10:45 AM    Drug Interactions  None.       Initial Education Provided for Specialty Medication  The patient has been provided with the following education and any applicable administration techniques (i.e. self-injection) have been demonstrated for the therapies indicated. All questions and concerns have been addressed prior to the patient receiving the medication, and the patient has verbalized understanding of the education and any materials provided.  Additional patient education shall be provided and documented upon request by the patient, provider or payer.      Nurtec (rimegepant) 75 mg ODT, 1 tablet by mouth every other day  Medication Expectations   Why am I taking this medication? You are taking this medication for migraine prophylaxis.   What should I expect while on this medication? You should expect to see a decrease in the frequency and severity of your migraines.   How does the medication work? Nurtec is a small molecule that binds to calcitonin gene-related peptide (CGRP) and blocks its binding to the receptor decreasing the severity of migraines.   How long will I be on this medication for? The amount of time you will be on this medication will be determined by your doctor and your response to the medication.    How do I take this medication? Take as directed on your prescription label.   What are some possible side effects? Potential side effects including, but not limited to nausea. Patient verbalized understanding.   What happens if I miss a dose? Take the missed dose as soon as possible, and resume the  every other day timed from the last dose.     Medication Safety   What are things I should warn my doctor immediately about? Hypersensitivity reactions - trouble breathing or swallowing.   What are things that I should be cautious of? Hypersensitivity reactions (eg, dyspnea, rash), including delayed serious reactions, have occurred; discontinue use if suspected    What are some medications that can interact with this one? Avoid concomitant administration of Nurtec ODT with strong inhibitors of CY, strong or moderate inducers of CYP3A or inhibitors of P-gp or BCRP. Avoid another dose of Nurtec ODT within 48 hours when it is administered with moderate inhibitors of CY. Ask your pharmacist or health care provider before starting new medications     Medication Storage/Handling   How should I handle this medication? Keep this medication out of reach of pets/children in original container. Ensure hands are dry before opening blister pack.   How does this medication need to be stored? Store at room temperature away from heat/cold, sunlight or moisture   How should I dispose of this medication? There should not be a need to dispose of this medication unless your provider decides to change the dose or therapy. If that is the case, take to your local police station for proper disposal. Some pharmacies also have take-back bins for medication drop-off.      Resources/Support   How can I remind myself to take this medication? You can download reminder apps to help you manage your refills. You may also set an alarm on your phone to remind you. The pharmacy carries pill boxes that you can place next to an area you pass everyday (such as where you place your car keys or where you charge your phone)   Is financial support available?  Yes, [facility] can provide co-pay cards if you have commercial insurance or patient assistance if you have Medicare or no insurance.    Which vaccines are recommended for me? Talk  to your doctor about these vaccines: Flu, Coronavirus (COVID-19), Pneumococcal (pneumonia), Tdap, Hepatitis B, Zoster (shingles)           Adherence and Self-Administration  Adherence related to the patient's specialty therapy was discussed with the patient. The Adherence segment of this outreach has been reviewed and updated.   Is there a concern with patient's ability to self administer the medication correctly and without issue?: No  Were any potential barriers to adherence identified during the initial assessment or patient education?: No  Are there any concerns regarding the patient's understanding of the importance of medication adherence?: No  Methods for Supporting Patient Adherence and/or Self-Administration: no further support needed at this time.      Goals of Therapy  Goals related to the patient's specialty therapy were discussed with the patient. The Patient Goals segment of this outreach has been reviewed and updated.   Goals Addressed Today        Specialty Pharmacy General Goal      Reduce frequency and severity of migraines by over 50%. As of 3/6/24, patient rates most severe migraines as a 8-9/10 on a pain scale from 1-10 associated light and sound sensitivity along with nausea without vomiting. Migraines can last up to 3-4 days in duration. She has a constant daily headache which can get worse.    6/10/25: Patient with small but persistent bumps at the administration site of fremanezumab, switching back to rimegepant every other day for migraine prevention. Patient currently with about 1-2 migraines per month and up to 10 headache days per month.     3/10/25: Patient changing from rimegepant for prevention and acute treatment to fremanezumab for prevention and ubrogepant for acute migraine treatment per insurance restrictions. With rimegepant, patient has been experiencing about 1-2 migraines per month with over 50% reduction in migraine severity and symptoms.     9/13/24: Patient reports she  very infrequently requires use of rimegepant for acute treatment of migraines, 0-1 times per month. On its own, medication is about 50% effective in reducing migraine symptoms. When combined with icing her head, this is 100% effective the majority of the time. No side-effects or concerns.                  Reassessment Plan & Follow-Up  Medication Therapy Changes: Stopping fremanezumab and restarting rimegepant dosed every other day for migraine prevention per patient's neurologist.   Related Plans, Therapy Recommendations, or Therapy Problems to Be Addressed: Nothing further to be addressed at this time.   Pharmacist to perform regular reassessments no more than (6) months from the previous assessment.  Care Coordinator to set up future refill outreaches, coordinate prescription delivery, and escalate clinical questions to pharmacist.   Welcome information and patient satisfaction survey to be sent by specialty pharmacy team with patient's initial fill.    Attestation  Therapeutic appropriateness: Appropriate   I attest the patient was actively involved in and has agreed to the above plan of care. If the prescribed therapy is at any point deemed not appropriate based on the current or future assessments, a consultation will be initiated with the patient's specialty care provider to determine the best course of action. The revised plan of therapy will be documented along with any additional patient education provided. Discussed aforementioned material with patient by phone.    Vlad Hernandez RPH  6/10/2025  10:53 EDT

## 2025-07-03 ENCOUNTER — OFFICE VISIT (OUTPATIENT)
Dept: OBSTETRICS AND GYNECOLOGY | Facility: CLINIC | Age: 30
End: 2025-07-03
Payer: COMMERCIAL

## 2025-07-03 VITALS
BODY MASS INDEX: 29.03 KG/M2 | HEIGHT: 67 IN | SYSTOLIC BLOOD PRESSURE: 123 MMHG | DIASTOLIC BLOOD PRESSURE: 79 MMHG | WEIGHT: 185 LBS

## 2025-07-03 DIAGNOSIS — N93.8 DYSFUNCTIONAL UTERINE BLEEDING: ICD-10-CM

## 2025-07-03 DIAGNOSIS — N89.8 VAGINAL DISCHARGE: Primary | ICD-10-CM

## 2025-07-03 PROCEDURE — 99213 OFFICE O/P EST LOW 20 MIN: CPT | Performed by: OBSTETRICS & GYNECOLOGY

## 2025-07-03 NOTE — PROGRESS NOTES
Subjective   Sylvester Clarke is a 29 y.o. female.     Cc:  Bleeding, discharge.    History of Present Illness - Patient is a 29 year old female who presents for episode of irregular bleeding.  Patient on progestin-only contraception and is compliant with medication.  She reports she had 2 cycles in one month.  The second episode has since resolved.  In addition, she self-treated for yeast infection with OTC medication.  She has some discharge.    The following portions of the patient's history were reviewed and updated as appropriate: She  has a past medical history of Allergic, Chlamydia (), COVID-19 (2023), Gestational hypertension (2023), History of  delivery (2024), Migraine (2023), Preeclampsia (2023), and Urinary tract infection (2022).  She  has a past surgical history that includes Cystectomy (); [place] tooth extraction (2015); and Finger surgery (Left).  Current Outpatient Medications   Medication Sig Dispense Refill    amLODIPine (NORVASC) 5 MG tablet Take 1 tablet by mouth Daily. 90 tablet 2    levocetirizine (XYZAL) 5 MG tablet Take 1 tablet by mouth Every Evening.      montelukast (Singulair) 10 MG tablet Take 1 tablet by mouth Every Night. 90 tablet 1    norethindrone (Jencycla) 0.35 MG tablet Take 1 tablet by mouth Daily. 84 tablet 3    rimegepant sulfate ODT (Nurtec) 75 MG disintegrating tablet Place 1 tablet under the tongue Every Other Day. 16 tablet 2     No current facility-administered medications for this visit.     She has no known allergies..    Review of Systems   Constitutional:  Negative for chills and fever.   Genitourinary:  Positive for vaginal discharge.       Objective   Physical Exam  Vitals reviewed. Exam conducted with a chaperone present.   Genitourinary:     General: Normal vulva.      Exam position: Lithotomy position.      Labia:         Right: No rash or tenderness.         Left: No rash or tenderness.       Vagina: Vaginal discharge  present.      Cervix: Normal.      Uterus: Normal.       Adnexa: Right adnexa normal and left adnexa normal.   Psychiatric:         Mood and Affect: Mood normal.         Behavior: Behavior normal.         Thought Content: Thought content normal.         Judgment: Judgment normal.         Assessment & Plan   Diagnoses and all orders for this visit:    1. Vaginal discharge (Primary)  -     NuSwab VG+ - Swab, Vagina  -     Discussed importance of regular screening and breast awareness.    2. Dysfunctional uterine bleeding        -      Patient given reassurance.  Progestin-only pills can be associated with breakthrough bleeding/DUB.  If bleeding is recurrent, she should let me know.    Rusty Seals MD

## 2025-07-07 ENCOUNTER — TELEPHONE (OUTPATIENT)
Dept: OBSTETRICS AND GYNECOLOGY | Facility: CLINIC | Age: 30
End: 2025-07-07
Payer: COMMERCIAL

## 2025-07-17 ENCOUNTER — SPECIALTY PHARMACY (OUTPATIENT)
Dept: NEUROLOGY | Facility: CLINIC | Age: 30
End: 2025-07-17
Payer: COMMERCIAL

## 2025-07-17 NOTE — PROGRESS NOTES
Specialty Pharmacy Patient Management Program  [facility] Out[facility]       Sylvester was contacted today regarding refills of their medication(s).    [facility]      Flowsheet Row Questionnaire Series Submission from 7/17/2025 in Initial Department with Marie, Generic Provider   Changes to allergies? No    Changes to medications? No    New conditions or infections since last clinic visit No    Unplanned office visit, urgent care, ED, or hospital admission in the last 4 weeks  No    How does patient/caregiver feel medication is working? Very good    Financial problems or insurance changes  No    Since the previous refill, were any specialty medication doses or scheduled injections missed or delayed?  No    Delivery address Prescription    Doses left of specialty medications 2    Copay verified? Yes    Delivery Date Selection 07/18/25             Refill Questions      Flowsheet Row Most Recent Value   If yes, please describe  NA   Does this patient require a clinical escalation to a pharmacist? No            Delivery Questions      Flowsheet Row Most Recent Value   Delivery method [employer]   Delivery address verified with patient/caregiver? Yes   Other address preferred NA   Number of medications in delivery 1   Medication(s) being filled and delivered Rimegepant Sulfate (NURTEC-ODT)   Copay verified? Yes   Copay amount $0   Copay form of payment No copayment ($0)   Signature Required No   Do you consent to receive electronic handouts?  Yes                   Follow-up: 25 day(s)     Bette Springer  7/17/2025  08:41 EDT

## 2025-08-06 RX ORDER — AMLODIPINE BESYLATE 5 MG/1
5 TABLET ORAL DAILY
Qty: 90 TABLET | Refills: 0 | Status: SHIPPED | OUTPATIENT
Start: 2025-08-06

## 2025-08-14 ENCOUNTER — SPECIALTY PHARMACY (OUTPATIENT)
Dept: NEUROLOGY | Facility: CLINIC | Age: 30
End: 2025-08-14
Payer: COMMERCIAL